# Patient Record
Sex: FEMALE | Race: WHITE | NOT HISPANIC OR LATINO | Employment: OTHER | URBAN - METROPOLITAN AREA
[De-identification: names, ages, dates, MRNs, and addresses within clinical notes are randomized per-mention and may not be internally consistent; named-entity substitution may affect disease eponyms.]

---

## 2017-01-04 ENCOUNTER — APPOINTMENT (OUTPATIENT)
Dept: LAB | Facility: CLINIC | Age: 77
End: 2017-01-04
Payer: MEDICARE

## 2017-01-04 DIAGNOSIS — E78.5 HYPERLIPIDEMIA: ICD-10-CM

## 2017-01-04 LAB
CHOLEST SERPL-MCNC: 230 MG/DL (ref 50–200)
HDLC SERPL-MCNC: 68 MG/DL (ref 40–60)
LDLC SERPL CALC-MCNC: 138 MG/DL (ref 0–100)
TRIGL SERPL-MCNC: 119 MG/DL

## 2017-01-04 PROCEDURE — 80061 LIPID PANEL: CPT

## 2017-01-04 PROCEDURE — 36415 COLL VENOUS BLD VENIPUNCTURE: CPT

## 2017-01-18 ENCOUNTER — ALLSCRIPTS OFFICE VISIT (OUTPATIENT)
Dept: OTHER | Facility: OTHER | Age: 77
End: 2017-01-18

## 2017-01-18 ENCOUNTER — LAB (OUTPATIENT)
Dept: LAB | Facility: CLINIC | Age: 77
End: 2017-01-18
Payer: MEDICARE

## 2017-01-18 DIAGNOSIS — E03.9 UNSPECIFIED HYPOTHYROIDISM: ICD-10-CM

## 2017-01-18 DIAGNOSIS — E03.9 HYPOTHYROIDISM: ICD-10-CM

## 2017-01-18 DIAGNOSIS — I12.9 PARENCHYMAL RENAL HYPERTENSION, STAGE 1-4 OR UNSPECIFIED CHRONIC KIDNEY DISEASE: Primary | ICD-10-CM

## 2017-01-18 DIAGNOSIS — M17.0 PRIMARY OSTEOARTHRITIS OF BOTH KNEES: ICD-10-CM

## 2017-01-18 DIAGNOSIS — M77.32 CALCANEAL SPUR OF LEFT FOOT: ICD-10-CM

## 2017-01-18 DIAGNOSIS — M48.061 SPINAL STENOSIS OF LUMBAR REGION: ICD-10-CM

## 2017-01-18 LAB
ALBUMIN SERPL BCP-MCNC: 3.9 G/DL (ref 3.5–5)
ANION GAP SERPL CALCULATED.3IONS-SCNC: 7 MMOL/L (ref 4–13)
BACTERIA UR QL AUTO: NORMAL /HPF
BILIRUB UR QL STRIP: NEGATIVE
BUN SERPL-MCNC: 17 MG/DL (ref 5–25)
CALCIUM SERPL-MCNC: 9.6 MG/DL (ref 8.3–10.1)
CHLORIDE SERPL-SCNC: 105 MMOL/L (ref 100–108)
CLARITY UR: NORMAL
CO2 SERPL-SCNC: 25 MMOL/L (ref 21–32)
COLOR UR: YELLOW
CREAT SERPL-MCNC: 1 MG/DL (ref 0.6–1.3)
CREAT UR-MCNC: 30.9 MG/DL
ERYTHROCYTE [DISTWIDTH] IN BLOOD BY AUTOMATED COUNT: 13.9 % (ref 11.6–15.1)
GFR SERPL CREATININE-BSD FRML MDRD: 53.8 ML/MIN/1.73SQ M
GLUCOSE SERPL-MCNC: 99 MG/DL (ref 65–140)
GLUCOSE UR STRIP-MCNC: NEGATIVE MG/DL
HCT VFR BLD AUTO: 41.7 % (ref 34.8–46.1)
HGB BLD-MCNC: 13.6 G/DL (ref 11.5–15.4)
HGB UR QL STRIP.AUTO: NEGATIVE
KETONES UR STRIP-MCNC: NEGATIVE MG/DL
LEUKOCYTE ESTERASE UR QL STRIP: NEGATIVE
MAGNESIUM SERPL-MCNC: 2.3 MG/DL (ref 1.6–2.6)
MCH RBC QN AUTO: 29.6 PG (ref 26.8–34.3)
MCHC RBC AUTO-ENTMCNC: 32.6 G/DL (ref 31.4–37.4)
MCV RBC AUTO: 91 FL (ref 82–98)
NITRITE UR QL STRIP: NEGATIVE
NON-SQ EPI CELLS URNS QL MICRO: NORMAL /HPF
PH UR STRIP.AUTO: 6.5 [PH] (ref 4.5–8)
PHOSPHATE SERPL-MCNC: 3.6 MG/DL (ref 2.3–4.1)
PLATELET # BLD AUTO: 230 THOUSANDS/UL (ref 149–390)
PMV BLD AUTO: 10.4 FL (ref 8.9–12.7)
POTASSIUM SERPL-SCNC: 4.3 MMOL/L (ref 3.5–5.3)
PROT UR STRIP-MCNC: NEGATIVE MG/DL
PROT UR-MCNC: <6 MG/DL
PROT/CREAT UR: <0.19 MG/G{CREAT} (ref 0–0.1)
PTH-INTACT SERPL-MCNC: 37.1 PG/ML (ref 14–72)
RBC # BLD AUTO: 4.59 MILLION/UL (ref 3.81–5.12)
RBC #/AREA URNS AUTO: NORMAL /HPF
SODIUM SERPL-SCNC: 137 MMOL/L (ref 136–145)
SP GR UR STRIP.AUTO: 1.01 (ref 1–1.03)
TSH SERPL DL<=0.05 MIU/L-ACNC: 2.39 UIU/ML (ref 0.36–3.74)
URATE SERPL-MCNC: 4.2 MG/DL (ref 2–6.8)
UROBILINOGEN UR QL STRIP.AUTO: 0.2 E.U./DL
WBC # BLD AUTO: 5.05 THOUSAND/UL (ref 4.31–10.16)
WBC #/AREA URNS AUTO: NORMAL /HPF

## 2017-01-18 PROCEDURE — 84443 ASSAY THYROID STIM HORMONE: CPT

## 2017-01-18 PROCEDURE — 85027 COMPLETE CBC AUTOMATED: CPT

## 2017-01-18 PROCEDURE — 84156 ASSAY OF PROTEIN URINE: CPT

## 2017-01-18 PROCEDURE — 80069 RENAL FUNCTION PANEL: CPT

## 2017-01-18 PROCEDURE — 81001 URINALYSIS AUTO W/SCOPE: CPT

## 2017-01-18 PROCEDURE — 36415 COLL VENOUS BLD VENIPUNCTURE: CPT

## 2017-01-18 PROCEDURE — 82570 ASSAY OF URINE CREATININE: CPT

## 2017-01-18 PROCEDURE — 83970 ASSAY OF PARATHORMONE: CPT

## 2017-01-18 PROCEDURE — 84550 ASSAY OF BLOOD/URIC ACID: CPT

## 2017-01-18 PROCEDURE — 83735 ASSAY OF MAGNESIUM: CPT

## 2017-01-31 ENCOUNTER — APPOINTMENT (OUTPATIENT)
Dept: PHYSICAL THERAPY | Facility: CLINIC | Age: 77
End: 2017-01-31
Payer: MEDICARE

## 2017-01-31 DIAGNOSIS — M17.0 PRIMARY OSTEOARTHRITIS OF BOTH KNEES: ICD-10-CM

## 2017-01-31 DIAGNOSIS — M48.061 SPINAL STENOSIS OF LUMBAR REGION: ICD-10-CM

## 2017-01-31 DIAGNOSIS — M77.32 CALCANEAL SPUR OF LEFT FOOT: ICD-10-CM

## 2017-01-31 PROCEDURE — 97140 MANUAL THERAPY 1/> REGIONS: CPT

## 2017-01-31 PROCEDURE — 97162 PT EVAL MOD COMPLEX 30 MIN: CPT

## 2017-01-31 PROCEDURE — G8991 OTHER PT/OT GOAL STATUS: HCPCS

## 2017-01-31 PROCEDURE — G8990 OTHER PT/OT CURRENT STATUS: HCPCS

## 2017-02-01 ENCOUNTER — GENERIC CONVERSION - ENCOUNTER (OUTPATIENT)
Dept: OTHER | Facility: OTHER | Age: 77
End: 2017-02-01

## 2017-02-07 ENCOUNTER — APPOINTMENT (OUTPATIENT)
Dept: PHYSICAL THERAPY | Facility: CLINIC | Age: 77
End: 2017-02-07
Payer: MEDICARE

## 2017-02-07 ENCOUNTER — GENERIC CONVERSION - ENCOUNTER (OUTPATIENT)
Dept: OTHER | Facility: OTHER | Age: 77
End: 2017-02-07

## 2017-02-07 PROCEDURE — 97110 THERAPEUTIC EXERCISES: CPT

## 2017-02-07 PROCEDURE — 97140 MANUAL THERAPY 1/> REGIONS: CPT

## 2017-02-10 ENCOUNTER — APPOINTMENT (OUTPATIENT)
Dept: PHYSICAL THERAPY | Facility: CLINIC | Age: 77
End: 2017-02-10
Payer: MEDICARE

## 2017-02-10 PROCEDURE — 97140 MANUAL THERAPY 1/> REGIONS: CPT

## 2017-02-10 PROCEDURE — 97110 THERAPEUTIC EXERCISES: CPT

## 2017-02-14 ENCOUNTER — APPOINTMENT (OUTPATIENT)
Dept: PHYSICAL THERAPY | Facility: CLINIC | Age: 77
End: 2017-02-14
Payer: MEDICARE

## 2017-02-14 PROCEDURE — 97140 MANUAL THERAPY 1/> REGIONS: CPT

## 2017-02-14 PROCEDURE — 97110 THERAPEUTIC EXERCISES: CPT

## 2017-02-17 ENCOUNTER — APPOINTMENT (OUTPATIENT)
Dept: PHYSICAL THERAPY | Facility: CLINIC | Age: 77
End: 2017-02-17
Payer: MEDICARE

## 2017-02-17 PROCEDURE — 97110 THERAPEUTIC EXERCISES: CPT

## 2017-02-17 PROCEDURE — 97140 MANUAL THERAPY 1/> REGIONS: CPT

## 2017-02-21 ENCOUNTER — APPOINTMENT (OUTPATIENT)
Dept: PHYSICAL THERAPY | Facility: CLINIC | Age: 77
End: 2017-02-21
Payer: MEDICARE

## 2017-02-21 PROCEDURE — 97140 MANUAL THERAPY 1/> REGIONS: CPT

## 2017-02-21 PROCEDURE — 97110 THERAPEUTIC EXERCISES: CPT

## 2017-02-24 ENCOUNTER — APPOINTMENT (OUTPATIENT)
Dept: PHYSICAL THERAPY | Facility: CLINIC | Age: 77
End: 2017-02-24
Payer: MEDICARE

## 2017-02-24 PROCEDURE — 97110 THERAPEUTIC EXERCISES: CPT

## 2017-02-28 ENCOUNTER — APPOINTMENT (OUTPATIENT)
Dept: PHYSICAL THERAPY | Facility: CLINIC | Age: 77
End: 2017-02-28
Payer: MEDICARE

## 2017-02-28 ENCOUNTER — GENERIC CONVERSION - ENCOUNTER (OUTPATIENT)
Dept: OTHER | Facility: OTHER | Age: 77
End: 2017-02-28

## 2017-02-28 PROCEDURE — 97110 THERAPEUTIC EXERCISES: CPT

## 2017-02-28 PROCEDURE — 97140 MANUAL THERAPY 1/> REGIONS: CPT

## 2017-02-28 PROCEDURE — G8991 OTHER PT/OT GOAL STATUS: HCPCS

## 2017-02-28 PROCEDURE — G8990 OTHER PT/OT CURRENT STATUS: HCPCS

## 2017-03-02 ENCOUNTER — APPOINTMENT (OUTPATIENT)
Dept: PHYSICAL THERAPY | Facility: CLINIC | Age: 77
End: 2017-03-02
Payer: MEDICARE

## 2017-03-02 PROCEDURE — 97110 THERAPEUTIC EXERCISES: CPT

## 2017-03-02 PROCEDURE — 97140 MANUAL THERAPY 1/> REGIONS: CPT

## 2017-03-07 ENCOUNTER — GENERIC CONVERSION - ENCOUNTER (OUTPATIENT)
Dept: OTHER | Facility: OTHER | Age: 77
End: 2017-03-07

## 2017-03-07 ENCOUNTER — APPOINTMENT (OUTPATIENT)
Dept: PHYSICAL THERAPY | Facility: CLINIC | Age: 77
End: 2017-03-07
Payer: MEDICARE

## 2017-03-07 PROCEDURE — 97110 THERAPEUTIC EXERCISES: CPT

## 2017-03-07 PROCEDURE — 97140 MANUAL THERAPY 1/> REGIONS: CPT

## 2017-03-09 ENCOUNTER — APPOINTMENT (OUTPATIENT)
Dept: PHYSICAL THERAPY | Facility: CLINIC | Age: 77
End: 2017-03-09
Payer: MEDICARE

## 2017-03-09 PROCEDURE — 97140 MANUAL THERAPY 1/> REGIONS: CPT

## 2017-03-09 PROCEDURE — 97150 GROUP THERAPEUTIC PROCEDURES: CPT

## 2017-03-09 PROCEDURE — 97110 THERAPEUTIC EXERCISES: CPT

## 2017-03-13 ENCOUNTER — APPOINTMENT (OUTPATIENT)
Dept: PHYSICAL THERAPY | Facility: CLINIC | Age: 77
End: 2017-03-13
Payer: MEDICARE

## 2017-03-13 PROCEDURE — 97110 THERAPEUTIC EXERCISES: CPT

## 2017-03-13 PROCEDURE — 97140 MANUAL THERAPY 1/> REGIONS: CPT

## 2017-03-14 ENCOUNTER — APPOINTMENT (OUTPATIENT)
Dept: PHYSICAL THERAPY | Facility: CLINIC | Age: 77
End: 2017-03-14
Payer: MEDICARE

## 2017-03-16 ENCOUNTER — APPOINTMENT (OUTPATIENT)
Dept: PHYSICAL THERAPY | Facility: CLINIC | Age: 77
End: 2017-03-16
Payer: MEDICARE

## 2017-03-21 ENCOUNTER — APPOINTMENT (OUTPATIENT)
Dept: PHYSICAL THERAPY | Facility: CLINIC | Age: 77
End: 2017-03-21
Payer: MEDICARE

## 2017-03-21 PROCEDURE — 97140 MANUAL THERAPY 1/> REGIONS: CPT

## 2017-03-21 PROCEDURE — 97110 THERAPEUTIC EXERCISES: CPT

## 2017-03-23 ENCOUNTER — APPOINTMENT (OUTPATIENT)
Dept: PHYSICAL THERAPY | Facility: CLINIC | Age: 77
End: 2017-03-23
Payer: MEDICARE

## 2017-03-23 PROCEDURE — 97110 THERAPEUTIC EXERCISES: CPT

## 2017-03-28 ENCOUNTER — APPOINTMENT (OUTPATIENT)
Dept: PHYSICAL THERAPY | Facility: CLINIC | Age: 77
End: 2017-03-28
Payer: MEDICARE

## 2017-03-28 PROCEDURE — G8991 OTHER PT/OT GOAL STATUS: HCPCS

## 2017-03-28 PROCEDURE — 97140 MANUAL THERAPY 1/> REGIONS: CPT

## 2017-03-28 PROCEDURE — G8990 OTHER PT/OT CURRENT STATUS: HCPCS

## 2017-03-28 PROCEDURE — 97110 THERAPEUTIC EXERCISES: CPT

## 2017-03-29 ENCOUNTER — GENERIC CONVERSION - ENCOUNTER (OUTPATIENT)
Dept: OTHER | Facility: OTHER | Age: 77
End: 2017-03-29

## 2017-03-30 ENCOUNTER — APPOINTMENT (OUTPATIENT)
Dept: PHYSICAL THERAPY | Facility: CLINIC | Age: 77
End: 2017-03-30
Payer: MEDICARE

## 2017-03-30 PROCEDURE — 97140 MANUAL THERAPY 1/> REGIONS: CPT

## 2017-03-30 PROCEDURE — 97110 THERAPEUTIC EXERCISES: CPT

## 2017-03-30 PROCEDURE — 97150 GROUP THERAPEUTIC PROCEDURES: CPT

## 2017-05-19 ENCOUNTER — ALLSCRIPTS OFFICE VISIT (OUTPATIENT)
Dept: OTHER | Facility: OTHER | Age: 77
End: 2017-05-19

## 2017-06-28 ENCOUNTER — GENERIC CONVERSION - ENCOUNTER (OUTPATIENT)
Dept: OTHER | Facility: OTHER | Age: 77
End: 2017-06-28

## 2017-07-03 DIAGNOSIS — E03.9 HYPOTHYROIDISM: ICD-10-CM

## 2017-07-03 DIAGNOSIS — E78.5 HYPERLIPIDEMIA: ICD-10-CM

## 2017-07-11 ENCOUNTER — APPOINTMENT (OUTPATIENT)
Dept: LAB | Facility: CLINIC | Age: 77
End: 2017-07-11
Payer: MEDICARE

## 2017-07-11 ENCOUNTER — TRANSCRIBE ORDERS (OUTPATIENT)
Dept: LAB | Facility: CLINIC | Age: 77
End: 2017-07-11

## 2017-07-11 DIAGNOSIS — E78.5 OTHER AND UNSPECIFIED HYPERLIPIDEMIA: ICD-10-CM

## 2017-07-11 DIAGNOSIS — E03.9 HYPOTHYROIDISM: ICD-10-CM

## 2017-07-11 DIAGNOSIS — E03.9 UNSPECIFIED HYPOTHYROIDISM: Primary | ICD-10-CM

## 2017-07-11 DIAGNOSIS — E78.5 HYPERLIPIDEMIA: ICD-10-CM

## 2017-07-11 LAB
ALBUMIN SERPL BCP-MCNC: 3.7 G/DL (ref 3.5–5)
ALP SERPL-CCNC: 73 U/L (ref 46–116)
ALT SERPL W P-5'-P-CCNC: 31 U/L (ref 12–78)
ANION GAP SERPL CALCULATED.3IONS-SCNC: 6 MMOL/L (ref 4–13)
AST SERPL W P-5'-P-CCNC: 21 U/L (ref 5–45)
BILIRUB SERPL-MCNC: 0.33 MG/DL (ref 0.2–1)
BUN SERPL-MCNC: 14 MG/DL (ref 5–25)
CALCIUM SERPL-MCNC: 9.4 MG/DL (ref 8.3–10.1)
CHLORIDE SERPL-SCNC: 105 MMOL/L (ref 100–108)
CHOLEST SERPL-MCNC: 227 MG/DL (ref 50–200)
CO2 SERPL-SCNC: 28 MMOL/L (ref 21–32)
CREAT SERPL-MCNC: 0.97 MG/DL (ref 0.6–1.3)
GFR SERPL CREATININE-BSD FRML MDRD: 55.7 ML/MIN/1.73SQ M
GLUCOSE P FAST SERPL-MCNC: 97 MG/DL (ref 65–99)
HDLC SERPL-MCNC: 67 MG/DL (ref 40–60)
LDLC SERPL CALC-MCNC: 149 MG/DL (ref 0–100)
POTASSIUM SERPL-SCNC: 4.4 MMOL/L (ref 3.5–5.3)
PROT SERPL-MCNC: 7.2 G/DL (ref 6.4–8.2)
SODIUM SERPL-SCNC: 139 MMOL/L (ref 136–145)
TRIGL SERPL-MCNC: 56 MG/DL
TSH SERPL DL<=0.05 MIU/L-ACNC: 2.42 UIU/ML (ref 0.36–3.74)

## 2017-07-11 PROCEDURE — 80053 COMPREHEN METABOLIC PANEL: CPT

## 2017-07-11 PROCEDURE — 84443 ASSAY THYROID STIM HORMONE: CPT

## 2017-07-11 PROCEDURE — 80061 LIPID PANEL: CPT

## 2017-07-11 PROCEDURE — 36415 COLL VENOUS BLD VENIPUNCTURE: CPT

## 2017-07-19 ENCOUNTER — ALLSCRIPTS OFFICE VISIT (OUTPATIENT)
Dept: OTHER | Facility: OTHER | Age: 77
End: 2017-07-19

## 2017-07-20 ENCOUNTER — GENERIC CONVERSION - ENCOUNTER (OUTPATIENT)
Dept: OTHER | Facility: OTHER | Age: 77
End: 2017-07-20

## 2017-09-05 ENCOUNTER — GENERIC CONVERSION - ENCOUNTER (OUTPATIENT)
Dept: OTHER | Facility: OTHER | Age: 77
End: 2017-09-05

## 2017-10-26 ENCOUNTER — GENERIC CONVERSION - ENCOUNTER (OUTPATIENT)
Dept: OTHER | Facility: OTHER | Age: 77
End: 2017-10-26

## 2017-11-29 ENCOUNTER — TRANSCRIBE ORDERS (OUTPATIENT)
Dept: ADMINISTRATIVE | Facility: HOSPITAL | Age: 77
End: 2017-11-29

## 2017-11-29 DIAGNOSIS — Z12.39 BREAST SCREENING: Primary | ICD-10-CM

## 2017-12-11 ENCOUNTER — HOSPITAL ENCOUNTER (OUTPATIENT)
Dept: MAMMOGRAPHY | Facility: HOSPITAL | Age: 77
Discharge: HOME | End: 2017-12-11
Attending: OBSTETRICS & GYNECOLOGY
Payer: MEDICARE

## 2017-12-11 ENCOUNTER — TRANSCRIBE ORDERS (OUTPATIENT)
Dept: ADMINISTRATIVE | Facility: HOSPITAL | Age: 77
End: 2017-12-11

## 2017-12-11 DIAGNOSIS — Z12.39 SCREENING BREAST EXAMINATION: Primary | ICD-10-CM

## 2017-12-11 DIAGNOSIS — Z12.39 BREAST SCREENING: ICD-10-CM

## 2017-12-12 ENCOUNTER — GENERIC CONVERSION - ENCOUNTER (OUTPATIENT)
Dept: OBGYN CLINIC | Facility: CLINIC | Age: 77
End: 2017-12-12

## 2017-12-12 ENCOUNTER — HOSPITAL ENCOUNTER (OUTPATIENT)
Dept: MAMMOGRAPHY | Facility: MEDICAL CENTER | Age: 77
Discharge: HOME | End: 2017-12-12
Payer: MEDICARE

## 2017-12-12 DIAGNOSIS — Z12.39 SCREENING BREAST EXAMINATION: ICD-10-CM

## 2017-12-12 PROCEDURE — 77063 BREAST TOMOSYNTHESIS BI: CPT

## 2017-12-12 PROCEDURE — G0202 SCR MAMMO BI INCL CAD: HCPCS

## 2018-01-13 VITALS
SYSTOLIC BLOOD PRESSURE: 128 MMHG | BODY MASS INDEX: 33.99 KG/M2 | HEIGHT: 61 IN | DIASTOLIC BLOOD PRESSURE: 72 MMHG | WEIGHT: 180 LBS

## 2018-01-14 VITALS
HEIGHT: 61 IN | WEIGHT: 167 LBS | BODY MASS INDEX: 31.53 KG/M2 | SYSTOLIC BLOOD PRESSURE: 122 MMHG | DIASTOLIC BLOOD PRESSURE: 72 MMHG

## 2018-01-18 NOTE — PROGRESS NOTES
Assessment    1  Adult hypothyroidism (244 9) (E03 9)   2  Hyperlipemia (272 4) (E78 5)   3  Encounter for preventive health examination (V70 0) (Z00 00)    Discussion/Summary  Impression: Initial Annual Wellness Visit, with preventive exam as well as age and risk appropriate counseling completed  Cardiovascular screening and counseling: screening is current  Diabetes screening and counseling: screening is current  Colorectal cancer screening and counseling: the risks and benefits of screening were discussed  Cervical cancer screening and counseling: screening not indicated  Osteoporosis screening and counseling: screening is current  Immunizations: influenza vaccine is up to date this year, UTD and Zostavax vaccination up to date  Advance Directive Planning: complete and up to date  Patient Discussion: plan discussed with the patient, follow-up visit needed in one year  Chief Complaint  Medicare wellness, had cataract surgery and has been having some issues but she does see the eye doctor tomorrow      History of Present Illness  Welcome to Medicare and Wellness Visits: The patient is being seen for the subsequent annual wellness visit  Medicare Screening and Risk Factors   Hospitalizations: she has been previously hospitalizied, she has been hospitalized 2 times and cataract surgery both eyes  Medicare Screening Tests Risk Questions   Drug and Alcohol Use: The patient is a former cigarette smoker, quit smoking 1991 and 1/4 pack a day  The patient reports occasional alcohol use, drinking 2-3 drinks per month and 2 glasses of wine  Alcohol concern:  no attempts to cut alcohol use  She has never used illicit drugs     Diet and Physical Activity: Current diet includes well balanced meals, low fat food choices, low salt food choices, limited junk food, 1-2 servings of fruit per day, 2-4 servings of vegetables per day, 1-2 servings of meat per day, 1-4 servings of whole grains per day, 1-2 servings of dairy products per day, 1-2 cups of coffee per day, 1-2 cups of tea per day and 8 glasses of water/day  She exercises 3 times per week  Exercise: stretching 90 minutes per week  Mood Disorder and Cognitive Impairment Screening: She denies feeling down, depressed, or hopeless over the past two weeks  She denies feeling little interest or pleasure in doing things over the past two weeks  Cognitive impairment screening: denies difficulty learning/retaining new information, denies difficulty handling complex tasks, denies difficulty with reasoning, denies difficulty with spatial ability and orientation, denies difficulty with language and denies difficulty with behavior  Functional Ability/Level of Safety: Hearing is slightly decreased, slightly decreased in the right ear, tinnitus and a hearing aid is not used  She reports hearing difficulties  Activities of daily living details: yard work and heavy cleaning, but does not need help using the phone, no transportation help needed, does not need help shopping, no meal preparation help needed, does not need help doing housework, does not need help doing laundry, does not need help managing medications and does not need help managing money  Fall risk factors: The patient fell 0 times in the past 12 months  Home safety risk factors:  no grab bars in the bathroom, but no unfamiliar surroundings, no loose rugs, no poor household lighting, no uneven floors, no household clutter and handrails on the stairs  Advance Directives: Advance directives: living will, durable power of  for health care directives and advance directives  Co-Managers and Medical Equipment/Suppliers: See Patient Care Team   Preventive Quality Program 65 and Older: Urinary Incontinence Symptoms includes: urinary incontinence and nocturia    Gets up 1-3 times at night to urinate     Date of last glaucoma screen was 7/20/2017      Patient Care Team    Care Team Member Role Specialty Office Number   [facility]  Dermatology [phone]   [address] [phone]   [facility] DO  Ophthalmology [phone]   Bharati Johnson MD  Gastroenterology Adult [phone]     Review of Systems    Constitutional: no fatigue  Head and Face: no facial pressure  Eyes: s/p bilateral cataract, but no watery discharge from the eyes  ENT: no nasal congestion and no nasal discharge  Cardiovascular: no chest pain, no palpitations and the heart is not racing  Respiratory: no shortness of breath  Gastrointestinal: no abdominal pain  Genitourinary: no dysuria  Musculoskeletal: back and knees driss stiff  Hasn't worked out in one month due to [address]  Integumentary and Breasts: negative  Psychiatric: no insomnia  Active Problems    1  Adult hypothyroidism (244 9) (E03 9)   2  Encounter for screening for osteoporosis (V82 81) (Z13 820)   3  Encounter for screening mammogram for breast cancer (V76 12) (Z12 31)   4  Glaucoma (365 9) (H40 9)   5  Heel spur, left (726 73) (M77 32)   6  Hyperlipemia (272 4) (E78 5)   7  Lumbar canal stenosis (724 02) (M48 06)   8  Lumbar radiculopathy (724 4) (M54 16)   9  Meningioma (225 2) (D32 9)   10  Peripheral neuropathy (356 9) (G62 9)   11  Pre-op evaluation (V72 84) (Z01 818)   12  Primary osteoarthritis of both knees (715 16) (M17 0)   13  Screening for colon cancer (V76 51) (Z12 11)   14  Screening for depression (V79 0) (Z13 89)   15  Vitamin D deficiency (268 9) (E55 9)    Past Medical History    · History of Arthritis (V13 4)   · History of migraine (V12 49) (Z86 69)   · History of thyroid disease (V12 29) (Z86 39)   · History of Pure hypercholesterolemia (272 0) (E78 00)    The active problems and past medical history were reviewed and updated today        Surgical History    · History of Dilation And Curettage   · History of Patient Education - Asthma (V65 49)   · History of Tubal Ligation    The surgical history was reviewed and updated today  Family History  Mother    · Family history of Pancreatic Cancer Susceptibility  Father    · Family history of Reported Family History Of Kidney Disease    The family history was reviewed and updated today  Social History    · Being A Social Drinker   · Caffeine Use   · Denied: History of Drug Use   · Marital History -  (V61 03)   · Never a smoker  The social history was reviewed and updated today  Current Meds   1  Levothyroxine Sodium 50 MCG Oral Tablet; take 1 tablet by mouth daily; Therapy: 89CHD4378 to (Evaluate:17Ber0320)  Requested for: 51QSE5569; Last   GK:91ICU2511 Ordered   2  Lumigan 0 01 % Ophthalmic Solution; INSTILL 1 DROP INTO BOTH EYES ONCE DAILY   IN THE EVENING; Last Rx:13Jan2016 Ordered   3  Multi-Day Oral Tablet; Take 1 daily; Last Rx:13Jan2016 Ordered   4  Pravastatin Sodium 20 MG Oral Tablet; take 1 tablet by mouth every day; Therapy: 70FBR2354 to (Evaluate:88Muu0771)  Requested for: 43UJX3798; Last   Rx:06Jun2017 Ordered   5  ProAir  (90 Base) MCG/ACT Inhalation Aerosol Solution; INHALE 2 PUFFS   EVERY 4-6 HOURS AS NEEDED  Requested for: 36LIB4507; Last Rx:08Mar2016   Ordered   6  Vitamin C TABS; Therapy: (Recorded:10Nov2016) to Recorded   7  Vitamin D 2000 UNIT Oral Tablet; Take one tablet daily; Last Rx:13Jan2016 Ordered    The medication list was reviewed and updated today  Allergies    1  Penicillins    Immunizations   1 2    Influenza  18-Sep-2015  (75y) 15-Sep-2016  (75y)    PCV  19-Jun-2015  (74y)     PPSV  13-Jul-2008  (67y)     Tetanus  13-Jan-2012  (71y)     Zoster  13-Jan-2010  (69y)      Vitals  Signs    Systolic: 917  Diastolic: 72   Height: 5 ft 0 7 in  Weight: 175 lb 6 oz  BMI Calculated: 33 46  BSA Calculated: 1 78    Physical Exam    Constitutional   General appearance: No acute distress, well appearing and well nourished    well developed, obese and appears younger than stated age  Ears, Nose, Mouth, and Throat   Otoscopic examination: Tympanic membranes translucent with normal light reflex  Canals patent without erythema  Lips, teeth, and gums: Normal, good dentition  Oropharynx: Normal with no erythema, edema, exudate or lesions  Pulmonary   Auscultation of lungs: Clear to auscultation  Cardiovascular   Auscultation of heart: Normal rate and rhythm, normal S1 and S2, no murmurs  Carotid pulses: 2+ bilaterally  Pedal pulses: 2+ bilaterally  Examination of extremities for edema and/or varicosities: Normal     Abdomen   Abdomen: Non-tender, no masses  Liver and spleen: No hepatomegaly or splenomegaly  Musculoskeletal   Gait and station: Abnormal   Gait evaluation demonstrated antalgia bilaterally  Range of motion: Abnormal   Range of Motion: Extension: painful  Neurologic   Sensation: No sensory loss      Psychiatric   Orientation to person, place, and time: Normal     Mood and affect: Normal        Future Appointments    Date/Time Provider Specialty Site   48/10/3920 59:29 AM Mari Harvey DO Family Medicine [facility]     Signatures   Electronically signed by : Gifty Rowe DO; Jul 20 0263 10:53AM EST                       (Author)

## 2018-01-19 ENCOUNTER — ALLSCRIPTS OFFICE VISIT (OUTPATIENT)
Dept: OTHER | Facility: OTHER | Age: 78
End: 2018-01-19

## 2018-01-20 NOTE — PROGRESS NOTES
Assessment   1  Adult hypothyroidism (244 9) (E03 9)   2  Hyperlipemia (272 4) (E78 5)   3  Lumbar canal stenosis (724 02) (M48 061)   4  Need for pneumococcal vaccine (V03 82) (Z23)   5  Primary osteoarthritis of both knees (715 16) (M17 0)    Plan   Adult hypothyroidism    · Levothyroxine Sodium 50 MCG Oral Tablet; take 1 tablet by mouth daily  Hyperlipemia    · Pravastatin Sodium 20 MG Oral Tablet; take 1 tablet by mouth every day  Lumbar canal stenosis    · * MRI LUMBAR SPINE WO CONTRAST; Status:Need Information - Financial    Authorization; Requested BWS:[id number];   Need for pneumococcal vaccine    · Prevnar 13 Intramuscular Suspension    Chief Complaint   Pt here for 6 month follow up of her chronic problems and medications  Her pharmacy has changed the tiers of 2 of her medications and wants to discuss this with you  Was going to physical therapy for muscle spasms, completed PT and was doing treadmill and bike independently but had to stop due to an increase of spasms  Her knees and left hip are giving her a lot of problem  History of Present Illness   The patient is being seen for follow-up of central hypothyroidism  The patient states her hyperlipidemia has been stable since the last visit  Symptoms:    The patient is being seen for a routine clinic follow-up of chronic low back pain  The patient is being seen for a knee problem , follow-up   Previous presentation included osteoarthritis  Past evaluation has included orthopedic evaluation  Review of Systems      ROS reviewed  Active Problems   1  Adult hypothyroidism (244 9) (E03 9)   2  Encounter for screening for osteoporosis (V82 81) (Z13 820)   3  Encounter for screening mammogram for breast cancer (V76 12) (Z12 31)   4  Glaucoma (365 9) (H40 9)   5  Heel spur, left (726 73) (M77 32)   6  Hyperlipemia (272 4) (E78 5)   7  Lumbar canal stenosis (724 02) (M48 061)   8  Lumbar radiculopathy (724 4) (M54 16)   9   Meningioma (225 2) (D32 9)   10  Peripheral neuropathy (356 9) (G62 9)   11  Pre-op evaluation (V72 84) (Z01 818)   12  Primary osteoarthritis of both knees (715 16) (M17 0)   13  Screening for colon cancer (V76 51) (Z12 11)   14  Screening for depression (V79 0) (Z13 89)   15  Vitamin D deficiency (268 9) (E55 9)    Past Medical History   1  History of Arthritis (V13 4)   2  History of migraine (V12 49) (Z86 69)   3  History of thyroid disease (V12 29) (Z86 39)   4  History of Pure hypercholesterolemia (272 0) (E78 00)     The active problems and past medical history were reviewed and updated today  Surgical History   1  History of Dilation And Curettage   2  History of Patient Education - Asthma (V65 49)   3  History of Tubal Ligation     The surgical history was reviewed and updated today  Family History   Mother    1  Family history of Pancreatic Cancer Susceptibility  Father    2  Family history of Reported Family History Of Kidney Disease     The family history was reviewed and updated today  Social History    · Being A Social Drinker   · Caffeine Use   · Denied: History of Drug Use   · Marital History -  (V61 03)   · Never a smoker  The social history was reviewed and updated today  Current Meds    1  Levothyroxine Sodium 50 MCG Oral Tablet; take 1 tablet by mouth daily; Therapy: 51MRQ4167 to ()  Requested for: 65ZLH1551; Last     Rx:30Oct2017 Ordered   2  Lumigan 0 01 % Ophthalmic Solution; INSTILL 1 DROP INTO BOTH EYES ONCE DAILY     IN THE EVENING; Last Rx:13Jan2016 Ordered   3  Multi-Day Oral Tablet; Take 1 daily; Last Rx:13Jan2016 Ordered   4  Pravastatin Sodium 20 MG Oral Tablet; take 1 tablet by mouth every day; Therapy: 24YTT2148 to (Marlyn Torres)  Requested for: 52Asi1843; Last     Rx:86Prj9300 Ordered   5   ProAir  (90 Base) MCG/ACT Inhalation Aerosol Solution; INHALE 2 PUFFS     EVERY 4-6 HOURS AS NEEDED  Requested for: 17IJV9799; Last WW:56WPT9294 Ordered   6  Vitamin C TABS; Therapy: (Recorded:10Nov2016) to Recorded   7  Vitamin D 2000 UNIT Oral Tablet; Take one tablet daily; Last Rx:13Jan2016 Ordered    Allergies   1  Penicillins    Vitals   Vital Signs    Recorded: 24GNX1383 11:18AM Recorded: 27SHI5931 25:52BA   Systolic 864    Diastolic 72    Height  5 ft 0 75 in   Weight  178 lb 8 oz   BMI Calculated  34 01   BSA Calculated  1 79     Results/Data   PHQ-2 Adult Depression Screening 19IGA4237 11:02AM User, Ahs      Test Name Result Flag Reference   PHQ-2 Adult Depression Score 0     Over the last two weeks, how often have you been bothered by any of the following problems?      Little interest or pleasure in doing things: Not at all - 0     Feeling down, depressed, or hopeless: Not at all - 0   PHQ-2 Adult Depression Screening Negative          Signatures    Electronically signed by : Prudencio Gallagher DO; Jan 19 7648  4:19PM EST                       (Author)

## 2018-01-22 VITALS
HEIGHT: 61 IN | WEIGHT: 175.38 LBS | BODY MASS INDEX: 33.11 KG/M2 | DIASTOLIC BLOOD PRESSURE: 72 MMHG | SYSTOLIC BLOOD PRESSURE: 122 MMHG

## 2018-01-23 VITALS
WEIGHT: 178.5 LBS | SYSTOLIC BLOOD PRESSURE: 128 MMHG | DIASTOLIC BLOOD PRESSURE: 72 MMHG | HEIGHT: 61 IN | BODY MASS INDEX: 33.7 KG/M2
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
## 2018-01-30 ENCOUNTER — TELEPHONE (OUTPATIENT)
Dept: FAMILY MEDICINE CLINIC | Facility: CLINIC | Age: 78
End: 2018-01-30

## 2018-01-30 NOTE — TELEPHONE ENCOUNTER
Patient called for her MRI of the lumbar spine results from 1/22/18  It was done at [facility]#[phone]

## 2018-01-30 NOTE — TELEPHONE ENCOUNTER
Unfortunately I don't see any info under "care anywhere" We are going to have to CALL for the results

## 2018-02-02 ENCOUNTER — TELEPHONE (OUTPATIENT)
Dept: FAMILY MEDICINE CLINIC | Facility: CLINIC | Age: 78
End: 2018-02-02

## 2018-02-02 NOTE — TELEPHONE ENCOUNTER
Patient calling for MRI results done at [address] [place] [address] [address] on 1/22/18, I did print results and put them on your work station to review

## 2018-02-02 NOTE — TELEPHONE ENCOUNTER
Most of the levels of the thoracic and lumbar discs are unchanged from previous MRI  Although they are unchanged they certainly do show significant degenerative process  The 1 level that did change for the worse is the L3-L4 level right-sided and left-sided changes  As well as increased central spinal stenosis  And at L4-L5 even though it is unchanged, there is marked spinal canal stenosis  So have the patient remind me again which way we were going to move forward with the findings  Continued physical therapy? Evaluation by Pain Management? Or evaluation by spinal surgeon?

## 2018-02-05 PROBLEM — M77.32 HEEL SPUR, LEFT: Status: ACTIVE | Noted: 2017-01-18

## 2018-02-05 RX ORDER — ALBUTEROL SULFATE 90 UG/1
2 AEROSOL, METERED RESPIRATORY (INHALATION) EVERY 4 HOURS PRN
COMMUNITY
End: 2018-09-20 | Stop reason: SDUPTHER

## 2018-02-05 RX ORDER — RIBOFLAVIN (VITAMIN B2) 100 MG
100 TABLET ORAL DAILY
COMMUNITY
End: 2021-08-05

## 2018-02-05 RX ORDER — LEVOTHYROXINE SODIUM 0.05 MG/1
1 TABLET ORAL DAILY
COMMUNITY
Start: 2017-01-31 | End: 2018-07-27 | Stop reason: SDUPTHER

## 2018-02-05 RX ORDER — PRAVASTATIN SODIUM 20 MG
1 TABLET ORAL DAILY
COMMUNITY
Start: 2016-12-11 | End: 2019-01-11 | Stop reason: SDUPTHER

## 2018-02-05 RX ORDER — MULTIVIT-MIN/IRON/FOLIC ACID/K 18-600-40
1 CAPSULE ORAL DAILY
COMMUNITY

## 2018-02-06 ENCOUNTER — TELEPHONE (OUTPATIENT)
Dept: FAMILY MEDICINE CLINIC | Facility: CLINIC | Age: 78
End: 2018-02-06

## 2018-02-06 ENCOUNTER — TRANSCRIBE ORDERS (OUTPATIENT)
Dept: LAB | Facility: CLINIC | Age: 78
End: 2018-02-06

## 2018-02-06 ENCOUNTER — OFFICE VISIT (OUTPATIENT)
Dept: FAMILY MEDICINE CLINIC | Facility: CLINIC | Age: 78
End: 2018-02-06
Payer: MEDICARE

## 2018-02-06 ENCOUNTER — LAB (OUTPATIENT)
Dept: LAB | Facility: CLINIC | Age: 78
End: 2018-02-06
Payer: MEDICARE

## 2018-02-06 VITALS
BODY MASS INDEX: 33.42 KG/M2 | HEIGHT: 61 IN | WEIGHT: 177 LBS | DIASTOLIC BLOOD PRESSURE: 74 MMHG | TEMPERATURE: 98.1 F | SYSTOLIC BLOOD PRESSURE: 124 MMHG

## 2018-02-06 DIAGNOSIS — E66.9 LIFELONG OBESITY: ICD-10-CM

## 2018-02-06 DIAGNOSIS — E78.5 HYPERLIPIDEMIA, UNSPECIFIED HYPERLIPIDEMIA TYPE: ICD-10-CM

## 2018-02-06 DIAGNOSIS — N18.30 CHRONIC KIDNEY DISEASE, STAGE III (MODERATE) (HCC): ICD-10-CM

## 2018-02-06 DIAGNOSIS — E03.9 MYXEDEMA HEART DISEASE: ICD-10-CM

## 2018-02-06 DIAGNOSIS — I12.9 PARENCHYMAL RENAL HYPERTENSION, STAGE 1 THROUGH STAGE 4 OR UNSPECIFIED CHRONIC KIDNEY DISEASE: ICD-10-CM

## 2018-02-06 DIAGNOSIS — R60.9 EDEMA, UNSPECIFIED TYPE: ICD-10-CM

## 2018-02-06 DIAGNOSIS — R60.0 LOCALIZED EDEMA: ICD-10-CM

## 2018-02-06 DIAGNOSIS — I51.9 MYXEDEMA HEART DISEASE: ICD-10-CM

## 2018-02-06 DIAGNOSIS — M48.061 SPINAL STENOSIS OF LUMBAR REGION, UNSPECIFIED WHETHER NEUROGENIC CLAUDICATION PRESENT: ICD-10-CM

## 2018-02-06 DIAGNOSIS — R10.11 RIGHT UPPER QUADRANT ABDOMINAL PAIN: Primary | ICD-10-CM

## 2018-02-06 DIAGNOSIS — E55.9 AVITAMINOSIS D: ICD-10-CM

## 2018-02-06 DIAGNOSIS — I12.9 PARENCHYMAL RENAL HYPERTENSION, STAGE 1 THROUGH STAGE 4 OR UNSPECIFIED CHRONIC KIDNEY DISEASE: Primary | ICD-10-CM

## 2018-02-06 LAB
25(OH)D3 SERPL-MCNC: 51.4 NG/ML (ref 30–100)
ALBUMIN SERPL BCP-MCNC: 4 G/DL (ref 3.5–5)
ALP SERPL-CCNC: 79 U/L (ref 46–116)
ALT SERPL W P-5'-P-CCNC: 27 U/L (ref 12–78)
ANION GAP SERPL CALCULATED.3IONS-SCNC: 7 MMOL/L (ref 4–13)
AST SERPL W P-5'-P-CCNC: 21 U/L (ref 5–45)
BACTERIA UR QL AUTO: NORMAL /HPF
BILIRUB SERPL-MCNC: 0.37 MG/DL (ref 0.2–1)
BILIRUB UR QL STRIP: NEGATIVE
BUN SERPL-MCNC: 9 MG/DL (ref 5–25)
CALCIUM SERPL-MCNC: 9.6 MG/DL (ref 8.3–10.1)
CHLORIDE SERPL-SCNC: 105 MMOL/L (ref 100–108)
CLARITY UR: CLEAR
CO2 SERPL-SCNC: 27 MMOL/L (ref 21–32)
COLOR UR: YELLOW
CREAT SERPL-MCNC: 0.9 MG/DL (ref 0.6–1.3)
CREAT UR-MCNC: 54.3 MG/DL
ERYTHROCYTE [DISTWIDTH] IN BLOOD BY AUTOMATED COUNT: 13.9 % (ref 11.6–15.1)
GFR SERPL CREATININE-BSD FRML MDRD: 61 ML/MIN/1.73SQ M
GLUCOSE P FAST SERPL-MCNC: 86 MG/DL (ref 65–99)
GLUCOSE UR STRIP-MCNC: NEGATIVE MG/DL
HCT VFR BLD AUTO: 43.5 % (ref 34.8–46.1)
HGB BLD-MCNC: 13.8 G/DL (ref 11.5–15.4)
HGB UR QL STRIP.AUTO: NEGATIVE
HYALINE CASTS #/AREA URNS LPF: NORMAL /LPF
KETONES UR STRIP-MCNC: NEGATIVE MG/DL
LEUKOCYTE ESTERASE UR QL STRIP: NEGATIVE
LIPASE SERPL-CCNC: 157 U/L (ref 73–393)
MAGNESIUM SERPL-MCNC: 2.6 MG/DL (ref 1.6–2.6)
MCH RBC QN AUTO: 29.2 PG (ref 26.8–34.3)
MCHC RBC AUTO-ENTMCNC: 31.7 G/DL (ref 31.4–37.4)
MCV RBC AUTO: 92 FL (ref 82–98)
NITRITE UR QL STRIP: NEGATIVE
NON-SQ EPI CELLS URNS QL MICRO: NORMAL /HPF
PH UR STRIP.AUTO: 6.5 [PH] (ref 4.5–8)
PHOSPHATE SERPL-MCNC: 3.4 MG/DL (ref 2.3–4.1)
PLATELET # BLD AUTO: 257 THOUSANDS/UL (ref 149–390)
PMV BLD AUTO: 10.7 FL (ref 8.9–12.7)
POTASSIUM SERPL-SCNC: 4.4 MMOL/L (ref 3.5–5.3)
PROT SERPL-MCNC: 7.9 G/DL (ref 6.4–8.2)
PROT UR STRIP-MCNC: NEGATIVE MG/DL
PROT UR-MCNC: <6 MG/DL
PROT/CREAT UR: <0.11 MG/G{CREAT} (ref 0–0.1)
RBC # BLD AUTO: 4.73 MILLION/UL (ref 3.81–5.12)
RBC #/AREA URNS AUTO: NORMAL /HPF
SODIUM SERPL-SCNC: 139 MMOL/L (ref 136–145)
SP GR UR STRIP.AUTO: 1.01 (ref 1–1.03)
UROBILINOGEN UR QL STRIP.AUTO: 0.2 E.U./DL
WBC # BLD AUTO: 6.19 THOUSAND/UL (ref 4.31–10.16)
WBC #/AREA URNS AUTO: NORMAL /HPF

## 2018-02-06 PROCEDURE — 84156 ASSAY OF PROTEIN URINE: CPT

## 2018-02-06 PROCEDURE — 83735 ASSAY OF MAGNESIUM: CPT

## 2018-02-06 PROCEDURE — 82306 VITAMIN D 25 HYDROXY: CPT

## 2018-02-06 PROCEDURE — 82570 ASSAY OF URINE CREATININE: CPT

## 2018-02-06 PROCEDURE — 81001 URINALYSIS AUTO W/SCOPE: CPT

## 2018-02-06 PROCEDURE — 99214 OFFICE O/P EST MOD 30 MIN: CPT | Performed by: NURSE PRACTITIONER

## 2018-02-06 PROCEDURE — 84100 ASSAY OF PHOSPHORUS: CPT

## 2018-02-06 PROCEDURE — 83690 ASSAY OF LIPASE: CPT | Performed by: NURSE PRACTITIONER

## 2018-02-06 PROCEDURE — 80053 COMPREHEN METABOLIC PANEL: CPT | Performed by: NURSE PRACTITIONER

## 2018-02-06 PROCEDURE — 85027 COMPLETE CBC AUTOMATED: CPT

## 2018-02-06 PROCEDURE — 36415 COLL VENOUS BLD VENIPUNCTURE: CPT | Performed by: NURSE PRACTITIONER

## 2018-02-06 NOTE — PROGRESS NOTES
Assessment/Plan:    Lumbar canal stenosis  Recent MRI was reviewed with the patient  She does want to continue with physical therapy did but does feel like she is to see pain management again, Dr Jameson Chakraborty with [facility]  Referral was given  Discussed the spasms she gets with this back pain and it is interfering with traveling because she is scared it will happen on a plan  Discussed the possibility of trying a muscle relaxant for traveling  She will discuss this with her nephrologist     Right upper quadrant abdominal pain   Right upper quadrant pain that gets worse after eating  Patient states it is getting better since Thursday but there is still some discomfort there after she eats  Patient was given lab work of a CMP and a lipase  She is going for lab work for her nephrologist today which will also include is CBC  She was given  Ultrasound of the abdomen order as well  She was educated on acute abdomen symptoms and to not hesitate to go to the emergency room if the symptoms do occur  She agrees with the plan and understands  Will follow up with lab work and abdominal ultrasound results  She will notify us of any changes and go to the ER if symptoms worsen or has any symptoms of acute abdomen that were educated to her  She made her 6 month follow up with Dr Amanda Tinsley today  Diagnoses and all orders for this visit:    Right upper quadrant abdominal pain  -     Lipase  -     Comprehensive metabolic panel  -     US abdomen complete; Future    Spinal stenosis of lumbar region, unspecified whether neurogenic claudication present  -     Ambulatory referral to Pain Management; Future        Patient Instructions   Complete lab work today which is added on to the lab work she is getting done with renal which includes a CBC  Get Ultrasound completed  If any changes let us know, acute changes that warrant an ER visit were educated to the patient  She understands and agrees with this plan         Subjective: Patient ID: Richa Higgins is a 65 y o  female  Chief Complaint   Patient presents with    Abdominal Pain     RUQ tenderness/pain x 5 days; concerned with gallbladder       Started Thursday night after she Ate [employer], had pressure "balloon" feeling in Right mid/upper abdomen and stated it felt firm  Firmness has resided  Has not eating yet today because she has blood work to get done  Discomfort is worsened with eating  Had severe pain in gallbladder area in the past, said she had EGD, only showed cholecystitis and didn't have to operate, just dietary changes  Her mom's sister and dad's sister had pancreatic cancer which is why she was concerned  Abdominal Pain   This is a new problem  The current episode started in the past 7 days (Thursday Night)  The onset quality is gradual  The problem occurs intermittently  The most recent episode lasted 5 days  The problem has been gradually improving  The pain is located in the RUQ  The pain is at a severity of 2/10  The pain is mild  The quality of the pain is a sensation of fullness  The abdominal pain radiates to the RUQ  Pertinent negatives include no anorexia, diarrhea, dysuria, fever, headaches, nausea or vomiting  The pain is aggravated by eating  The pain is relieved by nothing  She has tried nothing for the symptoms  cholecystitis         Review of Systems   Constitutional: Negative for appetite change and fever  HENT: Negative for congestion, sneezing and sore throat  Eyes: Negative for visual disturbance  Respiratory: Negative for cough and shortness of breath  Cardiovascular: Negative for chest pain and palpitations  Gastrointestinal: Positive for abdominal pain  Negative for anorexia, diarrhea, nausea and vomiting  Genitourinary: Negative for difficulty urinating and dysuria  Musculoskeletal:        Chronic back pain     Skin: Negative for color change  Neurological: Negative for headaches     Psychiatric/Behavioral: Negative for agitation  Objective:  /74 (BP Location: Left arm, Patient Position: Sitting, Cuff Size: Standard)   Temp 98 1 °F (36 7 °C)   Ht 5' 1" (1 549 m)   Wt 80 3 kg (177 lb)   BMI 33 44 kg/m²      Physical Exam   Constitutional: She is oriented to person, place, and time  She appears well-nourished  No distress  HENT:   Head: Normocephalic and atraumatic  Right Ear: External ear normal    Left Ear: External ear normal    Eyes: Right eye exhibits no discharge  Left eye exhibits no discharge  No scleral icterus  Neck: Normal range of motion  Neck supple  No tracheal deviation present  Cardiovascular: Normal rate and regular rhythm  No murmur heard  Pulmonary/Chest: Effort normal and breath sounds normal  No respiratory distress  She has no wheezes  Abdominal: Soft  Bowel sounds are normal  She exhibits no distension, no fluid wave, no abdominal bruit, no ascites and no mass  There is tenderness in the epigastric area  There is no rigidity, no rebound, no guarding, no tenderness at McBurney's point and negative De La Rosa's sign  No hernia  Musculoskeletal: She exhibits no edema or deformity  Lymphadenopathy:     She has no cervical adenopathy  Neurological: She is alert and oriented to person, place, and time  Skin: Skin is warm and dry  No rash noted  She is not diaphoretic  No erythema  No pallor  Psychiatric: She has a normal mood and affect   Her behavior is normal  Judgment and thought content normal

## 2018-02-06 NOTE — ASSESSMENT & PLAN NOTE
Recent MRI was reviewed with the patient  She does want to continue with physical therapy did but does feel like she is to see pain management again, Dr Jennifer Fuchs with [facility]  Referral was given  Discussed the spasms she gets with this back pain and it is interfering with traveling because she is scared it will happen on a plan  Discussed the possibility of trying a muscle relaxant for traveling   She will discuss this with her nephrologist

## 2018-02-06 NOTE — PATIENT INSTRUCTIONS
Complete lab work today which is added on to the lab work she is getting done with renal which includes a CBC  Get Ultrasound completed  If any changes let us know, acute changes that warrant an ER visit were educated to the patient  She understands and agrees with this plan

## 2018-02-06 NOTE — PROGRESS NOTES
I have reviewed the notes, assessments, and/or procedures performed by [facility], I concur with her/his documentation of Luis Stephens

## 2018-02-06 NOTE — ASSESSMENT & PLAN NOTE
Right upper quadrant pain that gets worse after eating  Patient states it is getting better since Thursday but there is still some discomfort there after she eats  Patient was given lab work of a CMP and a lipase  She is going for lab work for her nephrologist today which will also include is CBC  She was given  Ultrasound of the abdomen order as well  She was educated on acute abdomen symptoms and to not hesitate to go to the emergency room if the symptoms do occur  She agrees with the plan and understands

## 2018-02-06 NOTE — TELEPHONE ENCOUNTER
Called and spoke with patient regarding lab results of CMP, Lipase, and CBC which were all WNL these were completed 2/6/18 after visit today  She will get ultrasound done and we will follow up with those results

## 2018-02-13 ENCOUNTER — HOSPITAL ENCOUNTER (OUTPATIENT)
Dept: ULTRASOUND IMAGING | Facility: HOSPITAL | Age: 78
Discharge: HOME | End: 2018-02-13
Payer: MEDICARE

## 2018-02-13 DIAGNOSIS — R10.11 RIGHT UPPER QUADRANT ABDOMINAL PAIN: ICD-10-CM

## 2018-02-13 PROCEDURE — 76700 US EXAM ABDOM COMPLETE: CPT

## 2018-02-15 ENCOUNTER — TELEPHONE (OUTPATIENT)
Dept: FAMILY MEDICINE CLINIC | Facility: CLINIC | Age: 78
End: 2018-02-15

## 2018-02-15 DIAGNOSIS — R10.11 RIGHT UPPER QUADRANT ABDOMINAL PAIN: Primary | ICD-10-CM

## 2018-02-15 PROBLEM — K76.0 FATTY LIVER: Status: ACTIVE | Noted: 2018-02-15

## 2018-02-15 PROBLEM — D18.03 CAVERNOUS HEMANGIOMA OF LIVER: Status: ACTIVE | Noted: 2018-02-15

## 2018-02-15 NOTE — TELEPHONE ENCOUNTER
I called and spoke with Daniel Marlo today regarding her ultrasound results  Incidental findings such as hepatic cyst and hepatic hemangioma was discussed and educated to patient  Hemangioma has been visualized on multiple past CT scans that were stable  Fatty liver disease discussed with patient and ways to reduce cholesterol  She will be watching her diet more closely and exercising soon  She states this RUQ pain has completely resolved  She was educated that biliary dysfunction can still be present even when ultrasound and lab work is completely normal   She was given the option to see General surgery to discuss the benefits of doing a HIDA scan to further evaluate for any dysfunction  She states since she is feeling better now she is not interested in moving forward but if the pain occurs again she will call and consider seeing them and having the scan done

## 2018-05-22 ENCOUNTER — TELEPHONE (OUTPATIENT)
Dept: FAMILY MEDICINE CLINIC | Facility: CLINIC | Age: 78
End: 2018-05-22

## 2018-05-22 DIAGNOSIS — G62.9 PERIPHERAL POLYNEUROPATHY: ICD-10-CM

## 2018-05-22 DIAGNOSIS — E78.5 HYPERLIPIDEMIA, UNSPECIFIED HYPERLIPIDEMIA TYPE: Primary | ICD-10-CM

## 2018-05-22 NOTE — TELEPHONE ENCOUNTER
Pt called in she has an appt scheduled with you 6/5/2018 she would like if you can please order for her to get blood work done   CB# [phone]

## 2018-05-23 NOTE — TELEPHONE ENCOUNTER
The only 2 things that her do are lipid panel and TSH  All others were just updated in February    Use the diagnosis of hyperlipidemia and peripheral neuropathy

## 2018-05-27 ENCOUNTER — HOSPITAL ENCOUNTER (EMERGENCY)
Facility: HOSPITAL | Age: 78
Discharge: HOME | End: 2018-05-27
Attending: EMERGENCY MEDICINE
Payer: MEDICARE

## 2018-05-27 VITALS
OXYGEN SATURATION: 97 % | HEART RATE: 98 BPM | WEIGHT: 177.2 LBS | RESPIRATION RATE: 16 BRPM | TEMPERATURE: 99 F | DIASTOLIC BLOOD PRESSURE: 70 MMHG | BODY MASS INDEX: 33.48 KG/M2 | SYSTOLIC BLOOD PRESSURE: 160 MMHG

## 2018-05-27 DIAGNOSIS — Z23 RABIES, NEED FOR PROPHYLACTIC VACCINATION AGAINST: Primary | ICD-10-CM

## 2018-05-27 PROCEDURE — 90375 RABIES IG IM/SC: CPT | Performed by: PHYSICIAN ASSISTANT

## 2018-05-27 PROCEDURE — 96372 THER/PROPH/DIAG INJ SC/IM: CPT

## 2018-05-27 PROCEDURE — 99283 EMERGENCY DEPT VISIT LOW MDM: CPT

## 2018-05-27 PROCEDURE — 90675 RABIES VACCINE IM: CPT | Performed by: PHYSICIAN ASSISTANT

## 2018-05-27 PROCEDURE — 90471 IMMUNIZATION ADMIN: CPT

## 2018-05-27 RX ADMIN — RABIES VACCINE 1 ML: KIT at 16:51

## 2018-05-27 RX ADMIN — RABIES IMMUNE GLOBULIN (HUMAN) 1605 UNITS: 150 INJECTION INTRAMUSCULAR at 16:41

## 2018-05-27 NOTE — DISCHARGE INSTRUCTIONS
Rabies Vaccine   WHAT YOU NEED TO KNOW:   The rabies vaccine is an injection given to help prevent a rabies virus infection  The virus is spread to humans through the bite of an infected animal  Dogs, bats, skunks, coyotes, raccoons, and foxes are examples of animals that can carry rabies  The rabies vaccine can protect you from being infected with the virus  The vaccine can also prevent you from developing rabies even if you get it after you were bitten by an animal    DISCHARGE INSTRUCTIONS:   Call 911 for any of the following:   · Your mouth and throat are swollen  · You are wheezing or have trouble breathing  · You have chest pain or your heart is beating faster than normal for you  · You feel like you are going to faint  Return to the emergency department if:   · Your face is red or swollen  · You have hives that spread over your body  · You feel weak or dizzy  Contact your healthcare provider if:   · You have increased pain, redness, or swelling around the area where the shot was given  · You have questions or concerns about the rabies vaccine  Apply a warm compress  to the injection area as directed to decrease pain and swelling  Follow up with your healthcare provider as directed:  Write down your questions so you remember to ask them during your visits  © 2017 [facility] Information is for End User's use only and may not be sold, redistributed or otherwise used for commercial purposes  All illustrations and images included in CareNotes® are the copyrighted property of A D A M , Inc  or Corbin Mach[employer]  The above information is an  only  It is not intended as medical advice for individual conditions or treatments  Talk to your doctor, nurse or pharmacist before following any medical regimen to see if it is safe and effective for you  FOLLOW UP WITH YOUR PRIMARY CARE PROVIDER OR THE [facility]   MAKE AN APPOINTMENT TO BE SEEN     YOU NEED TO RETURN ON Wednesday MAY 30TH, Sunday June 3RD AND Sunday June 10TH FOR YOUR REPEAT RABIES DOSES  IF SYMPTOMS WORSEN OR NEW SYMPTOMS ARISE, RETURN TO THE ER TO BE SEEN

## 2018-05-27 NOTE — ED PROVIDER NOTES
History  Chief Complaint   Patient presents with   [place] or Exposure     Patient reports she just found out she has bats in her house  Concerned for rabies  77y  o female with PMH of HLD and thyroid disease presents to the ER for rabies series  Patient states she found two bats in her house since Wednesday  She does not believe she was bit but still wants the rabies series  She denies fever, chills, chest pain, dyspnea, N/V/D, abdominal pain, weakness or paresthesias  Patient's last tetanus was within the last 10 years  History provided by:  Patient   used: No        Prior to Admission Medications   Prescriptions Last Dose Informant Patient Reported? Taking? Ascorbic Acid (VITAMIN C) 100 MG tablet   Yes No   Sig: Take by mouth   Cholecalciferol (VITAMIN D) 2000 units CAPS   Yes No   Sig: Take 1 tablet by mouth daily   Multiple Vitamins-Minerals (MULTIVITAMIN ADULT PO)   Yes No   Sig: Take by mouth daily   albuterol (PROAIR HFA) 90 mcg/act inhaler   Yes No   Sig: Inhale 2 puffs   bimatoprost (LUMIGAN) 0 01 % ophthalmic drops   Yes No   Sig: Apply 1 drop to eye   levothyroxine 50 mcg tablet   Yes Yes   Sig: Take 1 tablet by mouth daily   pravastatin (PRAVACHOL) 20 mg tablet   Yes Yes   Sig: Take 1 tablet by mouth daily      Facility-Administered Medications: None       Past Medical History:   Diagnosis Date    Disease of thyroid gland     Hyperlipidemia        Past Surgical History:   Procedure Laterality Date    DILATION AND CURETTAGE, DIAGNOSTIC / THERAPEUTIC      TONSILLECTOMY      TUBAL LIGATION         History reviewed  No pertinent family history  I have reviewed and agree with the history as documented  Social History   Substance Use Topics    Smoking status: Former Smoker    Smokeless tobacco: Not on file    Alcohol use 0 6 oz/week     1 Glasses of wine per week        Review of Systems   Constitutional: Negative for chills and fever     Eyes: Negative for redness  Respiratory: Negative for shortness of breath  Cardiovascular: Negative for chest pain  Gastrointestinal: Negative for abdominal pain, diarrhea, nausea and vomiting  Musculoskeletal: Negative for neck stiffness  Skin: Negative for rash  Allergic/Immunologic: Negative for food allergies  Neurological: Negative for weakness and numbness  Physical Exam  Physical Exam   Constitutional:  Non-toxic appearance  No distress  HENT:   Head: Normocephalic and atraumatic  Right Ear: Tympanic membrane, external ear and ear canal normal  No drainage, swelling or tenderness  No foreign bodies  Tympanic membrane is not erythematous  No hemotympanum  Left Ear: Tympanic membrane, external ear and ear canal normal  No drainage, swelling or tenderness  No foreign bodies  Tympanic membrane is not erythematous  No hemotympanum  Nose: Nose normal    Mouth/Throat: Uvula is midline, oropharynx is clear and moist and mucous membranes are normal  No uvula swelling  No posterior oropharyngeal edema, posterior oropharyngeal erythema or tonsillar abscesses  No tonsillar exudate  Neck: Normal range of motion and phonation normal  Neck supple  No tracheal deviation present  Cardiovascular: Normal rate, regular rhythm, S1 normal, S2 normal and normal heart sounds  Exam reveals no gallop and no friction rub  No murmur heard  Pulmonary/Chest: Effort normal and breath sounds normal  No respiratory distress  She has no decreased breath sounds  She has no wheezes  She has no rhonchi  She has no rales  She exhibits no tenderness  Neurological: She is alert  GCS eye subscore is 4  GCS verbal subscore is 5  GCS motor subscore is 6  Skin: Skin is warm and dry  No rash noted  Psychiatric: She has a normal mood and affect  Nursing note and vitals reviewed        Vital Signs  ED Triage Vitals   Temperature Pulse Respirations Blood Pressure SpO2   05/27/18 1522 05/27/18 1518 05/27/18 1518 05/27/18 1518 05/27/18 1518   99 °F (37 2 °C) 98 16 160/70 97 %      Temp Source Heart Rate Source Patient Position - Orthostatic VS BP Location FiO2 (%)   05/27/18 1522 05/27/18 1518 05/27/18 1518 05/27/18 1518 --   Temporal Monitor Sitting Left arm       Pain Score       05/27/18 1518       No Pain           Vitals:    05/27/18 1518   BP: 160/70   Pulse: 98   Patient Position - Orthostatic VS: Sitting       Visual Acuity      ED Medications  Medications   rabies immune globulin, human (IMOGAM RABIES-HT) IM injection 1,605 Units (1,605 Units Intramuscular Given 5/27/18 1641)   rabies vaccine, chick embryo (RABAVERT) IM injection 1 mL (1 mL Intramuscular Given 5/27/18 1651)       Diagnostic Studies  Results Reviewed     None                 No orders to display              Procedures  Procedures       Phone Contacts  ED Phone Contact    ED Course                               MDM  Number of Diagnoses or Management Options  Rabies, need for prophylactic vaccination against: new and does not require workup  Diagnosis management comments: DDX consists of but not limited to: rabies series    Will give rabies series  At discharge, I instructed the patient to:  -follow up with pcp  -return on days 3,7 and 14 for next doses  -return to the ER if symptoms worsened or new symptoms arose  Patient agreed to this plan and was stable at time of discharge  Patient Progress  Patient progress: stable    CritCare Time    Disposition  Final diagnoses:   Rabies, need for prophylactic vaccination against     Time reflects when diagnosis was documented in both MDM as applicable and the Disposition within this note     Time User Action Codes Description Comment    5/27/2018  3:44 PM Shayna FARR Add [Z23] Rabies, need for prophylactic vaccination against       ED Disposition     ED Disposition Condition Comment    Discharge  Angelina Patricio discharge to home/self care      Condition at discharge: Stable        Follow-up Information Follow up With Specialties Details Why Contact Myles Patel DO Family Medicine Schedule an appointment as soon as possible for a visit in 1 day  [address]   [phone]            Discharge Medication List as of 5/27/2018  3:47 PM      CONTINUE these medications which have NOT CHANGED    Details   levothyroxine 50 mcg tablet Take 1 tablet by mouth daily, Starting Tue 1/31/2017, Historical Med      pravastatin (PRAVACHOL) 20 mg tablet Take 1 tablet by mouth daily, Starting Sun 12/11/2016, Historical Med      albuterol (PROAIR HFA) 90 mcg/act inhaler Inhale 2 puffs, Historical Med      Ascorbic Acid (VITAMIN C) 100 MG tablet Take by mouth, Historical Med      bimatoprost (LUMIGAN) 0 01 % ophthalmic drops Apply 1 drop to eye, Historical Med      Cholecalciferol (VITAMIN D) 2000 units CAPS Take 1 tablet by mouth daily, Historical Med      Multiple Vitamins-Minerals (MULTIVITAMIN ADULT PO) Take by mouth daily, Historical Med           No discharge procedures on file      ED Provider  Electronically Signed by           Emily Garcia PA-C  05/27/18 4672

## 2018-05-29 ENCOUNTER — TELEPHONE (OUTPATIENT)
Dept: FAMILY MEDICINE CLINIC | Facility: CLINIC | Age: 78
End: 2018-05-29

## 2018-05-29 NOTE — TELEPHONE ENCOUNTER
As long as she is following the protocol from the hospital I think it is okay for her to keep her appointment June 6

## 2018-05-29 NOTE — TELEPHONE ENCOUNTER
Patient called and stated she has an infestation of bats in her house  On Sunday 05/27/2018 she went to the hospital and got a series of 13 shots  She has the second round of shots scheduled for Wednesday 05/30/2018  She has an appt scheduled with you on 06/06/2018, asking if she should see you sooner or keep that appt

## 2018-05-30 ENCOUNTER — APPOINTMENT (OUTPATIENT)
Dept: LAB | Facility: CLINIC | Age: 78
End: 2018-05-30
Payer: MEDICARE

## 2018-05-30 ENCOUNTER — HOSPITAL ENCOUNTER (EMERGENCY)
Facility: HOSPITAL | Age: 78
Discharge: HOME | End: 2018-05-30
Attending: EMERGENCY MEDICINE
Payer: MEDICARE

## 2018-05-30 VITALS
DIASTOLIC BLOOD PRESSURE: 74 MMHG | SYSTOLIC BLOOD PRESSURE: 187 MMHG | OXYGEN SATURATION: 95 % | RESPIRATION RATE: 16 BRPM | BODY MASS INDEX: 33.44 KG/M2 | HEART RATE: 69 BPM | TEMPERATURE: 96.7 F | WEIGHT: 177 LBS

## 2018-05-30 DIAGNOSIS — Z23 NEED FOR IMMUNIZATION AGAINST RABIES: Primary | ICD-10-CM

## 2018-05-30 DIAGNOSIS — E78.5 HYPERLIPIDEMIA, UNSPECIFIED HYPERLIPIDEMIA TYPE: ICD-10-CM

## 2018-05-30 DIAGNOSIS — G62.9 PERIPHERAL POLYNEUROPATHY: ICD-10-CM

## 2018-05-30 LAB
CHOLEST SERPL-MCNC: 191 MG/DL (ref 50–200)
HDLC SERPL-MCNC: 65 MG/DL (ref 40–60)
LDLC SERPL CALC-MCNC: 111 MG/DL (ref 0–100)
NONHDLC SERPL-MCNC: 126 MG/DL
TRIGL SERPL-MCNC: 74 MG/DL
TSH SERPL DL<=0.05 MIU/L-ACNC: 1.26 UIU/ML (ref 0.36–3.74)

## 2018-05-30 PROCEDURE — 36415 COLL VENOUS BLD VENIPUNCTURE: CPT

## 2018-05-30 PROCEDURE — 99283 EMERGENCY DEPT VISIT LOW MDM: CPT

## 2018-05-30 PROCEDURE — 90675 RABIES VACCINE IM: CPT | Performed by: EMERGENCY MEDICINE

## 2018-05-30 PROCEDURE — 84443 ASSAY THYROID STIM HORMONE: CPT

## 2018-05-30 PROCEDURE — 80061 LIPID PANEL: CPT

## 2018-05-30 PROCEDURE — 90471 IMMUNIZATION ADMIN: CPT

## 2018-05-30 RX ADMIN — RABIES VACCINE 1 ML: KIT at 10:23

## 2018-05-30 NOTE — DISCHARGE INSTRUCTIONS
Rabies Vaccine   WHAT YOU NEED TO KNOW:   The rabies vaccine is an injection given to help prevent a rabies virus infection  The virus is spread to humans through the bite of an infected animal  Dogs, bats, skunks, coyotes, raccoons, and foxes are examples of animals that can carry rabies  The rabies vaccine can protect you from being infected with the virus  The vaccine can also prevent you from developing rabies even if you get it after you were bitten by an animal    DISCHARGE INSTRUCTIONS:   Call 911 for any of the following:   · Your mouth and throat are swollen  · You are wheezing or have trouble breathing  · You have chest pain or your heart is beating faster than normal for you  · You feel like you are going to faint  Return to the emergency department if:   · Your face is red or swollen  · You have hives that spread over your body  · You feel weak or dizzy  Contact your healthcare provider if:   · You have increased pain, redness, or swelling around the area where the shot was given  · You have questions or concerns about the rabies vaccine  Apply a warm compress  to the injection area as directed to decrease pain and swelling  Follow up with your healthcare provider as directed:  Write down your questions so you remember to ask them during your visits  © 2017 [facility] Information is for End User's use only and may not be sold, redistributed or otherwise used for commercial purposes  All illustrations and images included in CareNotes® are the copyrighted property of [employer]  or [employer]bin Mach[employer]  The above information is an  only  It is not intended as medical advice for individual conditions or treatments  Talk to your doctor, nurse or pharmacist before following any medical regimen to see if it is safe and effective for you

## 2018-05-30 NOTE — ED PROVIDER NOTES
Final Diagnosis:  1  Need for immunization against rabies      Chief Complaint   Patient presents with    Rabies Test     Patient reports she is here for 2nd shot in the rabies series  This is a 78-year-old female presenting for evaluation of rabies exposure  The patient states that there are multiple bats in her house  She started the rabies series already, here for 2nd shot  Denies f/ch/n/v/cp/sob  Tetanus up to date   PE:   Vitals:    05/30/18 1001   BP: (!) 187/74   BP Location: Left arm   Pulse: 69   Resp: 16   Temp: (!) 96 7 °F (35 9 °C)   TempSrc: Temporal   SpO2: 95%   Weight: 80 3 kg (177 lb)   General: VS reviewed  Appears in NAD  awake, alert  Well-nourished, well-developed  Appears stated age  Speaking normally in full sentences  Head: Normocephalic, atraumatic, nontender  Eyes: EOM-I  No diplopia  No hyphema  No subconjunctival hemorrhages  Symmetrical lids  ENT: Atraumatic external nose and ears  MMM  No malocclusion  No stridor  Normal phonation  No drooling  Normal swallowing  Neck: No JVD  CV: No pallor noted  Peripheral pulses +2 throughout  No chest wall tenderness  Lungs:   No tachypnea  No respiratory distress  MSK:   FROM   Skin: Dry, intact  Neuro: Awake, alert, GCS15, CN II-XII grossly intact  Motor grossly intact  Psychiatric/Behavioral: Appropriate mood and affect   Exam: deferred  A:  - rabies series  P:  - The patient has been exposed to rabies and has never been vaccinated against rabies  Therefore the patient should get 4 doses of rabies vaccine - one dose right away, and additional doses on the 3rd, 7th, and 14th days  (Rabjeronimot, 1 Kit)  Four doses (1 mL each) of either HDCV or PCECV vaccine should be administered on days 0, 3, 7, and 14  The first dose should be administered as soon as possible after exposure  It should be given intramuscularly into the deltoid muscle of adults   In children, it should be administered into either the deltoid muscle or the anterolateral aspect of the thigh  Will not use the gluteal region, because this could result in a decreased immunologic response  - They should also get Rabies Immune Globulin at the same time as the first dose (Rabies immunoglobulin; 20 IU/kg)  If not immediately available, the HRIG should be administered as soon as it becomes available up until and including day 7 of treatment  Concentrate as much of the dose as possible in and around the wound (if wound location allows)  The remaining HRIG should be administered intramuscularly at a site distance from the vaccine administration  Case reports have documented safe administration of HRIG and HDCV during pregnancy  - If immunosuppressed, give a dose on day 28  - Red flags for rabies reviewed  - 13 point ROS was performed and all are normal unless stated in the history above  - Nursing note reviewed  Vitals reviewed  - Orders placed by myself and/or advanced practitioner / resident     - Previous chart was reviewed  - No language barrier    - History obtained from patient  - There are no limitations to the history obtained  - Critical care time: Not applicable for this patient  Medications   rabies vaccine, chick embryo (RABAVERT) IM injection 1 mL (1 mL Intramuscular Given 5/30/18 1023)     No orders to display     No orders of the defined types were placed in this encounter  Labs Reviewed - No data to display  Time reflects when diagnosis was documented in both MDM as applicable and the Disposition within this note     Time User Action Codes Description Comment    5/30/2018 10:07 AM Katia Ocasio Add [Z23] Need for immunization against rabies       ED Disposition     ED Disposition Condition Comment    Discharge  Sherie Mejia discharge to home/self care      Condition at discharge: Good        Follow-up Information     Follow up With Specialties Details Why Contact Info Additional [address] [facility] Emergency Department Emergency Medicine Go to If symptoms worsen [address] [facility]  [phone] [place] ED, [address]  , [facility], [place], [address] [facility] [address], DO Family Medicine Call in 1 day To make appointment for re-evaluation in 1 week [address] [place] [address]  [phone]           Patient's Medications   Discharge Prescriptions    No medications on file     No discharge procedures on file  Prior to Admission Medications   Prescriptions Last Dose Informant Patient Reported? Taking? Ascorbic Acid (VITAMIN C) 100 MG tablet   Yes No   Sig: Take 100 mg by mouth daily     Cholecalciferol (VITAMIN D) 2000 units CAPS   Yes No   Sig: Take 1 tablet by mouth daily   Multiple Vitamins-Minerals (MULTIVITAMIN ADULT PO)   Yes No   Sig: Take by mouth daily   albuterol (PROAIR HFA) 90 mcg/act inhaler   Yes No   Sig: Inhale 2 puffs every 4 (four) hours as needed     bimatoprost (LUMIGAN) 0 01 % ophthalmic drops   Yes No   Sig: Administer 1 drop to both eyes daily at bedtime     levothyroxine 50 mcg tablet   Yes No   Sig: Take 1 tablet by mouth daily   pravastatin (PRAVACHOL) 20 mg tablet   Yes No   Sig: Take 1 tablet by mouth daily      Facility-Administered Medications: None       Portions of the record may have been created with voice recognition software  Occasional wrong word or "sound a like" substitutions may have occurred due to the inherent limitations of voice recognition software  Read the chart carefully and recognize, using context, where substitutions have occurred      Electronically signed by:  Samy Moralez MD  05/30/18 5318

## 2018-06-03 ENCOUNTER — HOSPITAL ENCOUNTER (EMERGENCY)
Facility: HOSPITAL | Age: 78
Discharge: HOME | End: 2018-06-03
Attending: EMERGENCY MEDICINE | Admitting: EMERGENCY MEDICINE
Payer: MEDICARE

## 2018-06-03 VITALS
BODY MASS INDEX: 33.44 KG/M2 | WEIGHT: 177 LBS | OXYGEN SATURATION: 98 % | HEART RATE: 72 BPM | TEMPERATURE: 97.7 F | RESPIRATION RATE: 18 BRPM | DIASTOLIC BLOOD PRESSURE: 71 MMHG | SYSTOLIC BLOOD PRESSURE: 157 MMHG

## 2018-06-03 DIAGNOSIS — Z23 ENCOUNTER FOR REPEAT ADMINISTRATION OF RABIES VACCINATION: Primary | ICD-10-CM

## 2018-06-03 PROCEDURE — 90675 RABIES VACCINE IM: CPT | Performed by: EMERGENCY MEDICINE

## 2018-06-03 PROCEDURE — 90471 IMMUNIZATION ADMIN: CPT

## 2018-06-03 PROCEDURE — 99281 EMR DPT VST MAYX REQ PHY/QHP: CPT

## 2018-06-03 RX ADMIN — RABIES VACCINE 1 ML: KIT at 09:45

## 2018-06-03 NOTE — DISCHARGE INSTRUCTIONS
Rabies Vaccine   WHAT YOU NEED TO KNOW:   The rabies vaccine is an injection given to help prevent a rabies virus infection  The virus is spread to humans through the bite of an infected animal  Dogs, bats, skunks, coyotes, raccoons, and foxes are examples of animals that can carry rabies  The rabies vaccine can protect you from being infected with the virus  The vaccine can also prevent you from developing rabies even if you get it after you were bitten by an animal    DISCHARGE INSTRUCTIONS:   Call 911 for any of the following:   · Your mouth and throat are swollen  · You are wheezing or have trouble breathing  · You have chest pain or your heart is beating faster than normal for you  · You feel like you are going to faint  Return to the emergency department if:   · Your face is red or swollen  · You have hives that spread over your body  · You feel weak or dizzy  Contact your healthcare provider if:   · You have increased pain, redness, or swelling around the area where the shot was given  · You have questions or concerns about the rabies vaccine  Apply a warm compress  to the injection area as directed to decrease pain and swelling  Follow up with your healthcare provider as directed:  Write down your questions so you remember to ask them during your visits  © 2017 [address] [facility] Information is for End User's use only and may not be sold, redistributed or otherwise used for commercial purposes  All illustrations and images included in CareNotes® are the copyrighted property of A [employer]  or [employer]bin Machado  The above information is an  only  It is not intended as medical advice for individual conditions or treatments  Talk to your doctor, nurse or pharmacist before following any medical regimen to see if it is safe and effective for you

## 2018-06-03 NOTE — ED PROVIDER NOTES
History  Chief Complaint   Patient presents with    Follow Up Rabies     3rd of series  Original bat exposure no bite  69-year-old female presents for continuation of rabies series  The patient was initially evaluated in our emergency department on 05/27/18  At that time she stated that she had had two Bruno in her house for few days  She did not think she been bit but requested a rabies series  She received her 1st dose on 05/27/2018 under 2nd on 05/30/2017  She is now here for her 3rd shot in the series  She denies any symptoms  This is been constant without radiation  Social and family history are noncontributory  History provided by:  Patient   used: No        Prior to Admission Medications   Prescriptions Last Dose Informant Patient Reported? Taking? Ascorbic Acid (VITAMIN C) 100 MG tablet 6/2/2018 at Unknown time  Yes Yes   Sig: Take 100 mg by mouth daily     Cholecalciferol (VITAMIN D) 2000 units CAPS 6/2/2018 at Unknown time  Yes Yes   Sig: Take 1 tablet by mouth daily   Multiple Vitamins-Minerals (MULTIVITAMIN ADULT PO) 6/2/2018 at Unknown time  Yes Yes   Sig: Take by mouth daily   albuterol (PROAIR HFA) 90 mcg/act inhaler 6/2/2018 at Unknown time  Yes Yes   Sig: Inhale 2 puffs every 4 (four) hours as needed     bimatoprost (LUMIGAN) 0 01 % ophthalmic drops 6/2/2018 at Unknown time  Yes Yes   Sig: Administer 1 drop to both eyes daily at bedtime     levothyroxine 50 mcg tablet 6/3/2018 at Unknown time  Yes Yes   Sig: Take 1 tablet by mouth daily   pravastatin (PRAVACHOL) 20 mg tablet 6/2/2018 at Unknown time  Yes Yes   Sig: Take 1 tablet by mouth daily      Facility-Administered Medications: None       Past Medical History:   Diagnosis Date    Disease of thyroid gland     Hyperlipidemia        Past Surgical History:   Procedure Laterality Date    DILATION AND CURETTAGE, DIAGNOSTIC / THERAPEUTIC      TONSILLECTOMY      TUBAL LIGATION         History reviewed   No pertinent family history  I have reviewed and agree with the history as documented  Social History   Substance Use Topics    Smoking status: Former Smoker    Smokeless tobacco: Never Used    Alcohol use 0 6 oz/week     1 Glasses of wine per week        Review of Systems   Constitutional: Negative for activity change, appetite change, fatigue, fever and unexpected weight change  HENT: Negative for congestion, hearing loss, rhinorrhea, sore throat and voice change  Eyes: Negative for pain and visual disturbance  Respiratory: Negative for apnea, cough, chest tightness and shortness of breath  Cardiovascular: Negative for chest pain  Gastrointestinal: Negative for abdominal pain, blood in stool, diarrhea, nausea and vomiting  Endocrine: Negative for polyphagia and polyuria  Genitourinary: Negative for difficulty urinating, dysuria, flank pain, frequency and urgency  Musculoskeletal: Negative for back pain, gait problem, neck pain and neck stiffness  Skin: Negative for color change and rash  Neurological: Negative for dizziness, syncope, speech difficulty, light-headedness, numbness and headaches  Psychiatric/Behavioral: Negative for confusion and decreased concentration  Physical Exam  Physical Exam   Constitutional: She is oriented to person, place, and time  She appears well-developed and well-nourished  HENT:   Head: Normocephalic and atraumatic  Eyes: Conjunctivae and EOM are normal  Pupils are equal, round, and reactive to light  No scleral icterus  Neck: Normal range of motion  Neck supple  No tracheal deviation present  Cardiovascular: Normal rate and regular rhythm  Pulmonary/Chest: Effort normal and breath sounds normal  No respiratory distress  Abdominal: Soft  She exhibits no distension  There is no tenderness  There is no rebound and no guarding  Musculoskeletal: Normal range of motion  She exhibits no tenderness or deformity     Lymphadenopathy:     She has no cervical adenopathy  Neurological: She is alert and oriented to person, place, and time  No gross focal sensory or motor deficits   Skin: Skin is warm and dry  No rash noted  She is not diaphoretic  Psychiatric: She has a normal mood and affect  Vitals reviewed  Vital Signs  ED Triage Vitals [06/03/18 0918]   Temperature Pulse Respirations Blood Pressure SpO2   97 7 °F (36 5 °C) 72 18 157/71 98 %      Temp Source Heart Rate Source Patient Position - Orthostatic VS BP Location FiO2 (%)   Temporal -- -- -- --      Pain Score       No Pain           Vitals:    06/03/18 0918   BP: 157/71   Pulse: 72       Visual Acuity      ED Medications  Medications   rabies vaccine, chick embryo (RABAVERT) IM injection 1 mL (not administered)       Diagnostic Studies  Results Reviewed     None                 No orders to display              Procedures  Procedures       Phone Contacts  ED Phone Contact    ED Course                               MDM  Number of Diagnoses or Management Options  Encounter for repeat administration of rabies vaccination: new and requires workup  Diagnosis management comments: 74-year-old female presented for the 3rd shot of rabies series  No complaints today  She will return in one week for her final install meant  She does have an appointment to see her primary physician on 06/06/2018         Amount and/or Complexity of Data Reviewed  Review and summarize past medical records: yes      CritCare Time    Disposition  Final diagnoses:   Encounter for repeat administration of rabies vaccination     Time reflects when diagnosis was documented in both MDM as applicable and the Disposition within this note     Time User Action Codes Description Comment    6/3/2018  9:32 AM Rolanda Levi Add [Z23] Encounter for repeat administration of rabies vaccination       ED Disposition     None      Follow-up Information     Follow up With Specialties Details Why Contact Info Additional Information [id number]  Emergency Department Emergency Medicine   [address] [facility]  [phone] [place] ED, [address]  , [place], [place], [address]          Patient's Medications   Discharge Prescriptions    No medications on file     No discharge procedures on file      ED Provider  Electronically Signed by           Kike De La Rosa MD  06/03/18 9406

## 2018-06-06 ENCOUNTER — OFFICE VISIT (OUTPATIENT)
Dept: FAMILY MEDICINE CLINIC | Facility: CLINIC | Age: 78
End: 2018-06-06
Payer: MEDICARE

## 2018-06-06 VITALS
DIASTOLIC BLOOD PRESSURE: 76 MMHG | HEIGHT: 61 IN | SYSTOLIC BLOOD PRESSURE: 132 MMHG | WEIGHT: 176.2 LBS | BODY MASS INDEX: 33.27 KG/M2

## 2018-06-06 DIAGNOSIS — E03.9 ADULT HYPOTHYROIDISM: Primary | ICD-10-CM

## 2018-06-06 DIAGNOSIS — Z23 NEED FOR SHINGLES VACCINE: ICD-10-CM

## 2018-06-06 DIAGNOSIS — E78.01 FAMILIAL HYPERCHOLESTEROLEMIA: ICD-10-CM

## 2018-06-06 DIAGNOSIS — Z20.3 CONTACT WITH AND (SUSPECTED) EXPOSURE TO RABIES: ICD-10-CM

## 2018-06-06 PROBLEM — J45.909 ASTHMA: Status: ACTIVE | Noted: 2018-06-06

## 2018-06-06 PROBLEM — N85.9 DISORDER OF UTERUS: Status: ACTIVE | Noted: 2018-06-06

## 2018-06-06 PROBLEM — N18.30 CHRONIC RENAL INSUFFICIENCY, STAGE III (MODERATE) (HCC): Status: ACTIVE | Noted: 2018-06-06

## 2018-06-06 PROBLEM — M35.9 AUTOIMMUNE DISEASE (HCC): Status: ACTIVE | Noted: 2018-06-06

## 2018-06-06 PROBLEM — N84.0 POLYP OF CORPUS UTERI: Status: ACTIVE | Noted: 2018-06-06

## 2018-06-06 PROBLEM — E78.9 DISORDER OF LIPID METABOLISM: Status: ACTIVE | Noted: 2018-06-06

## 2018-06-06 PROCEDURE — 99214 OFFICE O/P EST MOD 30 MIN: CPT | Performed by: FAMILY MEDICINE

## 2018-06-06 NOTE — PROGRESS NOTES
Assessment/Plan:     Diagnoses and all orders for this visit:    Adult hypothyroidism    Familial hypercholesterolemia    Need for shingles vaccine  -     Zoster Vac Recomb Adjuvanted ([address]) 50 MCG SUSR; Inject 50 mcg into the shoulder, thigh, or buttocks once for 1 dose    Contact with and (suspected) exposure to rabies         patient's current diagnosis of hypothyroidism and hyperlipidemia are well controlled  Continue same regimen  Follow-up in the fall for adult Medicare wellness exam and flu shot  Time spent with patient 25+ minutes in reviewing her recent medical events as well as greater than  50% counseling performed  Subjective:   Chief Complaint   Patient presents with    Follow-up     pt stated that she had bats in her house and had to get rabies shots would like to discuss this    Blood work     discuss results in chart      Patient ID: Steffen De León is a 65 y o  female  Patient is a pleasant 79-year-old female who is here to review blood work  She unfortunately had an experience with the exposure to bats in her house  By the healthcare protocol unfortunately she did require the rabies vaccination series and will be finishing them up over the next weeks  She will be trying to sell her house after the repairs are done  She is currently living with friends  She has seen Orthopedics   [facility] and will be starting physical therapy  Her back is doing fairly well  She sees Dr Ninfa Brewer for pain management  Patient has had Zostavax prior and she is interested in trying to find out if the new shingles shot is reasonably covered    Her lab work was reviewed her cholesterol is excellent    Her thyroid is at goal         The following portions of the patient's history were reviewed and updated as appropriate: allergies, current medications, past family history, past medical history, past social history, past surgical history and problem list       Review of Systems   Constitutional: Patient is stressed over recent issue with back and the rabies series administer to [address] [facility]   HENT: Positive for postnasal drip  Eyes: Negative for visual disturbance  Respiratory: Negative for cough and choking  Cardiovascular: Negative for chest pain  Gastrointestinal: Negative  Genitourinary: Negative  Musculoskeletal: Positive for arthralgias  Neurological: Negative  Psychiatric/Behavioral: Negative  Stressed         Objective:  /76   Ht 5' 1" (1 549 m)   Wt 79 9 kg (176 lb 3 2 oz)   BMI 33 29 kg/m²     Component      Latest Ref Rng & Units 5/30/2018   Cholesterol      50 - 200 mg/dL 191   Triglycerides      <=150 mg/dL 74   HDL      40 - 60 mg/dL 65 (H)   LDL Calculated      0 - 100 mg/dL 111 (H)      Physical Exam   Constitutional: She is oriented to person, place, and time  She appears well-developed and well-nourished  Pleasant 40-year-old female who looks younger than her stated age with a BMI of 33%   Neck: Normal range of motion  Cardiovascular: Normal rate  Abdominal: Soft  Musculoskeletal:   Some crepitus in knees   Neurological: She is alert and oriented to person, place, and time  Psychiatric: Her behavior is normal  Judgment and thought content normal  Her mood appears anxious

## 2018-06-10 ENCOUNTER — HOSPITAL ENCOUNTER (EMERGENCY)
Facility: HOSPITAL | Age: 78
Discharge: HOME | End: 2018-06-10
Attending: EMERGENCY MEDICINE
Payer: MEDICARE

## 2018-06-10 VITALS
WEIGHT: 176.15 LBS | DIASTOLIC BLOOD PRESSURE: 85 MMHG | HEART RATE: 69 BPM | BODY MASS INDEX: 33.28 KG/M2 | SYSTOLIC BLOOD PRESSURE: 157 MMHG | RESPIRATION RATE: 18 BRPM | TEMPERATURE: 97.4 F | OXYGEN SATURATION: 97 %

## 2018-06-10 DIAGNOSIS — Z23 NEED FOR IMMUNIZATION AGAINST RABIES: Primary | ICD-10-CM

## 2018-06-10 PROCEDURE — 90675 RABIES VACCINE IM: CPT | Performed by: PHYSICIAN ASSISTANT

## 2018-06-10 PROCEDURE — 99281 EMR DPT VST MAYX REQ PHY/QHP: CPT

## 2018-06-10 PROCEDURE — 90471 IMMUNIZATION ADMIN: CPT

## 2018-06-10 RX ADMIN — RABIES VACCINE 1 ML: KIT at 10:01

## 2018-06-10 NOTE — DISCHARGE INSTRUCTIONS
Rabies Vaccine   WHAT YOU NEED TO KNOW:   The rabies vaccine is an injection given to help prevent a rabies virus infection  The virus is spread to humans through the bite of an infected animal  Dogs, bats, skunks, coyotes, raccoons, and foxes are examples of animals that can carry rabies  The rabies vaccine can protect you from being infected with the virus  The vaccine can also prevent you from developing rabies even if you get it after you were bitten by an animal    DISCHARGE INSTRUCTIONS:   Call 911 for any of the following:   · Your mouth and throat are swollen  · You are wheezing or have trouble breathing  · You have chest pain or your heart is beating faster than normal for you  · You feel like you are going to faint  Return to the emergency department if:   · Your face is red or swollen  · You have hives that spread over your body  · You feel weak or dizzy  Contact your healthcare provider if:   · You have increased pain, redness, or swelling around the area where the shot was given  · You have questions or concerns about the rabies vaccine  Apply a warm compress  to the injection area as directed to decrease pain and swelling  Follow up with your healthcare provider as directed:  Write down your questions so you remember to ask them during your visits  © 2017 [facility] Information is for End User's use only and may not be sold, redistributed or otherwise used for commercial purposes  All illustrations and images included in CareNotes® are the copyrighted property of [employer]  or [employer]  The above information is an  only  It is not intended as medical advice for individual conditions or treatments  Talk to your doctor, nurse or pharmacist before following any medical regimen to see if it is safe and effective for you  DISCHARGE INSTRUCTIONS:    FOLLOW UP WITH YOUR PRIMARY CARE PROVIDER OR THE [address] [place] [address] AN APPOINTMENT TO BE SEEN  IF SYMPTOMS WORSEN OR NEW SYMPTOMS ARISE, RETURN TO THE ER TO BE SEEN

## 2018-06-10 NOTE — ED PROVIDER NOTES
History  Chief Complaint   Patient presents with    Follow Up Rabies     Pt here for last shot in the rabies series  States that bats were entering house through a hole in the ceiling       77y  o female with PMH of thyroid disease and HLD presents to the ER for rabies vaccine  Patient was seen on 5/27 after she found two bats in her house  She did not believe she was bitten but wanted to complete the rabies series just incase  She denies any symptoms currently  She denies fever, chills, chest pain, dyspnea, N/V/D, abdominal pain, weakness or paresthesias  History provided by:  Patient   used: No        Prior to Admission Medications   Prescriptions Last Dose Informant Patient Reported? Taking? Ascorbic Acid (VITAMIN C) 100 MG tablet   Yes No   Sig: Take 100 mg by mouth daily     Cholecalciferol (VITAMIN D) 2000 units CAPS   Yes No   Sig: Take 1 tablet by mouth daily   Multiple Vitamins-Minerals (MULTIVITAMIN ADULT PO)   Yes No   Sig: Take by mouth daily   albuterol (PROAIR HFA) 90 mcg/act inhaler   Yes No   Sig: Inhale 2 puffs every 4 (four) hours as needed     bimatoprost (LUMIGAN) 0 01 % ophthalmic drops   Yes No   Sig: Administer 1 drop to both eyes daily at bedtime     levothyroxine 50 mcg tablet   Yes No   Sig: Take 1 tablet by mouth daily   pravastatin (PRAVACHOL) 20 mg tablet   Yes No   Sig: Take 1 tablet by mouth daily      Facility-Administered Medications: None       Past Medical History:   Diagnosis Date    Disease of thyroid gland     Hyperlipidemia        Past Surgical History:   Procedure Laterality Date    DILATION AND CURETTAGE, DIAGNOSTIC / THERAPEUTIC      TONSILLECTOMY      TUBAL LIGATION         History reviewed  No pertinent family history  I have reviewed and agree with the history as documented      Social History   Substance Use Topics    Smoking status: Former Smoker    Smokeless tobacco: Never Used    Alcohol use 0 6 oz/week     1 Glasses of wine per week        Review of Systems   Constitutional: Negative for chills and fever  Eyes: Negative for redness  Respiratory: Negative for shortness of breath  Cardiovascular: Negative for chest pain  Gastrointestinal: Negative for abdominal pain, diarrhea, nausea and vomiting  Musculoskeletal: Negative for neck stiffness  Skin: Negative for rash  Allergic/Immunologic: Negative for food allergies  Neurological: Negative for weakness and numbness  Physical Exam  Physical Exam   Constitutional:  Non-toxic appearance  No distress  HENT:   Head: Normocephalic and atraumatic  Right Ear: Tympanic membrane, external ear and ear canal normal  No drainage, swelling or tenderness  No foreign bodies  Tympanic membrane is not erythematous  No hemotympanum  Left Ear: Tympanic membrane, external ear and ear canal normal  No drainage, swelling or tenderness  No foreign bodies  Tympanic membrane is not erythematous  No hemotympanum  Nose: Nose normal    Mouth/Throat: Uvula is midline, oropharynx is clear and moist and mucous membranes are normal  No uvula swelling  No posterior oropharyngeal edema, posterior oropharyngeal erythema or tonsillar abscesses  No tonsillar exudate  Neck: Normal range of motion and phonation normal  Neck supple  No tracheal deviation present  Cardiovascular: Normal rate, regular rhythm, S1 normal, S2 normal and normal heart sounds  Exam reveals no gallop and no friction rub  No murmur heard  Pulmonary/Chest: Effort normal and breath sounds normal  No respiratory distress  She has no decreased breath sounds  She has no wheezes  She has no rhonchi  She has no rales  She exhibits no tenderness  Neurological: She is alert  GCS eye subscore is 4  GCS verbal subscore is 5  GCS motor subscore is 6  Skin: Skin is warm and dry  No rash noted  Psychiatric: She has a normal mood and affect  Nursing note and vitals reviewed        Vital Signs  ED Triage Vitals [06/10/18 0943]   Temperature Pulse Respirations Blood Pressure SpO2   (!) 97 4 °F (36 3 °C) 69 18 157/85 97 %      Temp Source Heart Rate Source Patient Position - Orthostatic VS BP Location FiO2 (%)   Temporal Monitor Sitting Right arm --      Pain Score       No Pain           Vitals:    06/10/18 0943   BP: 157/85   Pulse: 69   Patient Position - Orthostatic VS: Sitting       Visual Acuity      ED Medications  Medications   rabies vaccine, chick embryo (RABAVERT) IM injection 1 mL (1 mL Intramuscular Given 6/10/18 1001)       Diagnostic Studies  Results Reviewed     None                 No orders to display              Procedures  Procedures       Phone Contacts  ED Phone Contact    ED Course                               MDM  Number of Diagnoses or Management Options  Need for immunization against rabies: established and improving  Diagnosis management comments: DDX consists of but not limited to: need for rabies vaccine    Will give Rabavert  At discharge, I instructed the patient to:  -follow up with pcp  -return to the ER if symptoms worsened or new symptoms arose  Patient agreed to this plan and was stable at time of discharge  Amount and/or Complexity of Data Reviewed  Review and summarize past medical records: yes    Patient Progress  Patient progress: stable    CritCare Time    Disposition  Final diagnoses:   Need for immunization against rabies     Time reflects when diagnosis was documented in both MDM as applicable and the Disposition within this note     Time User Action Codes Description Comment    6/10/2018 10:28 AM Staci FARR Add [Z23] Need for immunization against rabies       ED Disposition     ED Disposition Condition Comment    Discharge  Martha Doshi discharge to home/self care      Condition at discharge: Stable        Follow-up Information     Follow up With Specialties Details Why Contact Myles Santiago DO Family Medicine Schedule an appointment as soon as possible for a visit As needed [address]   [phone]            Patient's Medications   Discharge Prescriptions    No medications on file     No discharge procedures on file      ED Provider  Electronically Signed by           Mandy Garvin PA-C  06/10/18 1047

## 2018-07-27 DIAGNOSIS — E03.9 ADULT HYPOTHYROIDISM: Primary | ICD-10-CM

## 2018-07-27 RX ORDER — LEVOTHYROXINE SODIUM 0.05 MG/1
50 TABLET ORAL DAILY
Qty: 90 TABLET | Refills: 1 | Status: SHIPPED | OUTPATIENT
Start: 2018-07-27 | End: 2019-01-10 | Stop reason: SDUPTHER

## 2018-09-12 ENCOUNTER — OFFICE VISIT (OUTPATIENT)
Dept: URGENT CARE | Age: 78
End: 2018-09-12
Payer: MEDICARE

## 2018-09-12 VITALS
TEMPERATURE: 99.3 F | BODY MASS INDEX: 32.2 KG/M2 | OXYGEN SATURATION: 95 % | HEIGHT: 62 IN | WEIGHT: 175 LBS | RESPIRATION RATE: 16 BRPM | DIASTOLIC BLOOD PRESSURE: 65 MMHG | SYSTOLIC BLOOD PRESSURE: 142 MMHG | HEART RATE: 79 BPM

## 2018-09-12 DIAGNOSIS — J06.9 UPPER RESPIRATORY TRACT INFECTION, UNSPECIFIED TYPE: Primary | ICD-10-CM

## 2018-09-12 PROCEDURE — 99213 OFFICE O/P EST LOW 20 MIN: CPT | Performed by: PHYSICIAN ASSISTANT

## 2018-09-12 PROCEDURE — G0463 HOSPITAL OUTPT CLINIC VISIT: HCPCS | Performed by: PHYSICIAN ASSISTANT

## 2018-09-12 RX ORDER — FLUTICASONE PROPIONATE 50 MCG
2 SPRAY, SUSPENSION (ML) NASAL DAILY
Qty: 16 G | Refills: 0 | Status: SHIPPED | OUTPATIENT
Start: 2018-09-12 | End: 2019-01-18 | Stop reason: ALTCHOICE

## 2018-09-12 RX ORDER — CETIRIZINE HYDROCHLORIDE 10 MG/1
5 TABLET ORAL DAILY
Qty: 30 TABLET | Refills: 0 | Status: SHIPPED | OUTPATIENT
Start: 2018-09-12 | End: 2019-01-18 | Stop reason: ALTCHOICE

## 2018-09-12 RX ORDER — AZITHROMYCIN 250 MG/1
TABLET, FILM COATED ORAL
Qty: 6 TABLET | Refills: 0 | Status: SHIPPED | OUTPATIENT
Start: 2018-09-12 | End: 2018-09-17

## 2018-09-12 RX ORDER — BENZONATATE 100 MG/1
100 CAPSULE ORAL 3 TIMES DAILY PRN
Qty: 15 CAPSULE | Refills: 0 | Status: SHIPPED | OUTPATIENT
Start: 2018-09-12 | End: 2018-09-20 | Stop reason: ALTCHOICE

## 2018-09-12 NOTE — PATIENT INSTRUCTIONS
Take medications as directed  Motrin and/or Tylenol as needed for fevers or pain  Continue using normal medicines  Follow up with PCP in 3-5 days  Proceed to  ER if symptoms worsen  Upper Respiratory Infection   AMBULATORY CARE:   An upper respiratory infection  is also called a common cold  It can affect your nose, throat, ears, and sinuses  Common signs and symptoms include the following:  Cold symptoms are usually worst for the first 3 to 5 days  You may have any of the following:  · Runny or stuffy nose    · Sneezing and coughing    · Sore throat or hoarseness    · Red, watery, and sore eyes    · Fatigue     · Chills and fever    · Headache, body aches, or sore muscles  Seek care immediately if:   · You have chest pain or trouble breathing  Contact your healthcare provider if:   · You have a fever over 102ºF (39°C)  · Your sore throat gets worse or you see white or yellow spots in your throat  · Your symptoms get worse after 3 to 5 days or your cold is not better in 14 days  · You have a rash anywhere on your skin  · You have large, tender lumps in your neck  · You have thick, green or yellow drainage from your nose  · You cough up thick yellow, green, or bloody mucus  · You have vomiting for more than 24 hours and cannot keep fluids down  · You have a bad earache  · You have questions or concerns about your condition or care  Treatment for a cold: There is no cure for the common cold  Colds are caused by viruses and do not get better with antibiotics  Most people get better in 7 to 14 days  You may continue to cough for 2 to 3 weeks  The following may help decrease your symptoms:  · Decongestants  help reduce nasal congestion and help you breathe more easily  If you take decongestant pills, they may make you feel restless or not able to sleep  Do not use decongestant sprays for more than a few days  · Cough suppressants  help reduce coughing   Ask your healthcare provider which type of cough medicine is best for you  · NSAIDs , such as ibuprofen, help decrease swelling, pain, and fever  NSAIDs can cause stomach bleeding or kidney problems in certain people  If you take blood thinner medicine, always ask your healthcare provider if NSAIDs are safe for you  Always read the medicine label and follow directions  · Acetaminophen  decreases pain and fever  It is available without a doctor's order  Ask how much to take and how often to take it  Follow directions  Read the labels of all other medicines you are using to see if they also contain acetaminophen, or ask your doctor or pharmacist  Acetaminophen can cause liver damage if not taken correctly  Do not use more than 4 grams (4,000 milligrams) total of acetaminophen in one day  Manage your cold:   · Rest as much as possible  Slowly start to do more each day  · Drink more liquids as directed  Liquids will help thin and loosen mucus so you can cough it up  Liquids will also help prevent dehydration  Liquids that help prevent dehydration include water, fruit juice, and broth  Do not drink liquids that contain caffeine  Caffeine can increase your risk for dehydration  Ask your healthcare provider how much liquid to drink each day  · Soothe a sore throat  Gargle with warm salt water  This helps your sore throat feel better  Make salt water by dissolving ¼ teaspoon salt in 1 cup warm water  You may also suck on hard candy or throat lozenges  You may use a sore throat spray  · Use a humidifier or vaporizer  Use a cool mist humidifier or a vaporizer to increase air moisture in your home  This may make it easier for you to breathe and help decrease your cough  · Use saline nasal drops as directed  These help relieve congestion  · Apply petroleum-based jelly around the outside of your nostrils  This can decrease irritation from blowing your nose  · Do not smoke    Nicotine and other chemicals in cigarettes and cigars can make your symptoms worse  They can also cause infections such as bronchitis or pneumonia  Ask your healthcare provider for information if you currently smoke and need help to quit  E-cigarettes or smokeless tobacco still contain nicotine  Talk to your healthcare provider before you use these products  Prevent spreading your cold to others:   · Try to stay away from other people during the first 2 to 3 days of your cold when it is more easily spread  · Do not share food or drinks  · Do not share hand towels with household members  · Wash your hands often, especially after you blow your nose  Turn away from other people and cover your mouth and nose with a tissue when you sneeze or cough  Follow up with your healthcare provider as directed:  Write down your questions so you remember to ask them during your visits  © 2017 [address] Information is for End User's use only and may not be sold, redistributed or otherwise used for commercial purposes  All illustrations and images included in CareNotes® are the copyrighted property of [employer]  or [employer]  The above information is an  only  It is not intended as medical advice for individual conditions or treatments  Talk to your doctor, nurse or pharmacist before following any medical regimen to see if it is safe and effective for you

## 2018-09-12 NOTE — PROGRESS NOTES
[facility] [employer] [facility]        NAME: Ozzie Orellana is a 65 y o  female  : 1940    MRN: [id number]  DATE: 2018  TIME: 11:08 AM    Assessment and Plan   Upper respiratory tract infection, unspecified type [J06 9]  1  Upper respiratory tract infection, unspecified type  cetirizine (ZyrTEC) 10 mg tablet    fluticasone (FLONASE) 50 mcg/act nasal spray    azithromycin (ZITHROMAX) 250 mg tablet    benzonatate (TESSALON PERLES) 100 mg capsule         Patient Instructions     Take medications as directed  Motrin and/or Tylenol as needed for fevers or pain  Continue using normal medicines  Follow up with PCP in 3-5 days  Proceed to  ER if symptoms worsen  Chief Complaint     Chief Complaint   Patient presents with    Cough     Pt c/o cough, congestion, and night sweats for a few days  History of Present Illness       27-year-old female presents with several day history of cough congestion and night sweats  Patient also reports some mild sore throats  Had some mild nausea and a couple bouts of diarrhea initially this past weekend when it 1st started  It has now resolved  Patient still has the chest congestion cough mild sore throat  Patient also reports feeling fatigued      URI    This is a new problem  The current episode started in the past 7 days  The problem has been waxing and waning  Maximum temperature: Subjective fevers  The fever has been present for 1 to 2 days  Associated symptoms include congestion, coughing, diarrhea, nausea, rhinorrhea and a sore throat  Pertinent negatives include no abdominal pain, chest pain, dysuria, ear pain, headaches, neck pain, rash, sneezing, vomiting or wheezing  She has tried nothing for the symptoms  The treatment provided no relief  Review of Systems   Review of Systems   Constitutional: Negative  HENT: Positive for congestion, rhinorrhea and sore throat  Negative for ear pain and sneezing  Eyes: Negative      Respiratory: Positive for cough  Negative for wheezing  Cardiovascular: Negative for chest pain  Gastrointestinal: Positive for diarrhea and nausea  Negative for abdominal pain and vomiting  Genitourinary: Negative  Negative for dysuria  Musculoskeletal: Negative  Negative for neck pain  Skin: Negative for rash  Neurological: Negative for headaches           Current Medications       Current Outpatient Prescriptions:     bimatoprost (LUMIGAN) 0 01 % ophthalmic drops, Administer 1 drop to both eyes daily at bedtime  , Disp: , Rfl:     levothyroxine 50 mcg tablet, Take 1 tablet (50 mcg total) by mouth daily, Disp: 90 tablet, Rfl: 1    pravastatin (PRAVACHOL) 20 mg tablet, Take 1 tablet by mouth daily, Disp: , Rfl:     albuterol (PROAIR HFA) 90 mcg/act inhaler, Inhale 2 puffs every 4 (four) hours as needed  , Disp: , Rfl:     Ascorbic Acid (VITAMIN C) 100 MG tablet, Take 100 mg by mouth daily  , Disp: , Rfl:     azithromycin (ZITHROMAX) 250 mg tablet, Take 2 tablets today then 1 tablet daily x 4 days, Disp: 6 tablet, Rfl: 0    benzonatate (TESSALON PERLES) 100 mg capsule, Take 1 capsule (100 mg total) by mouth 3 (three) times a day as needed for cough, Disp: 15 capsule, Rfl: 0    cetirizine (ZyrTEC) 10 mg tablet, Take 0 5 tablets (5 mg total) by mouth daily, Disp: 30 tablet, Rfl: 0    Cholecalciferol (VITAMIN D) 2000 units CAPS, Take 1 tablet by mouth daily, Disp: , Rfl:     fluticasone (FLONASE) 50 mcg/act nasal spray, 2 sprays into each nostril daily, Disp: 16 g, Rfl: 0    Multiple Vitamins-Minerals (MULTIVITAMIN ADULT PO), Take by mouth daily, Disp: , Rfl:     Current Allergies     Allergies as of 09/12/2018 - Reviewed 09/12/2018   Allergen Reaction Noted    Penicillins Hives, Rash, and Edema 04/08/2014            The following portions of the patient's history were reviewed and updated as appropriate: allergies, current medications, past family history, past medical history, past social history, past surgical history and problem list      Past Medical History:   Diagnosis Date    Arthritis     Disease of thyroid gland     Hyperlipidemia     Migraine     Pure hypercholesterolemia     Thyroid disease        Past Surgical History:   Procedure Laterality Date    DILATION AND CURETTAGE, DIAGNOSTIC / THERAPEUTIC      TONSILLECTOMY      TUBAL LIGATION         Family History   Problem Relation Age of Onset    Pancreatic cancer Mother         susceptibility     Kidney disease Father          Medications have been verified  Objective   /65   Pulse 79   Temp 99 3 °F (37 4 °C) (Tympanic)   Resp 16   Ht 5' 2" (1 575 m)   Wt 79 4 kg (175 lb)   SpO2 95%   BMI 32 01 kg/m²        Physical Exam     Physical Exam   Constitutional: She is oriented to person, place, and time  She appears well-developed and well-nourished  No distress  HENT:   Head: Normocephalic and atraumatic  Right Ear: External ear normal    Left Ear: External ear normal    Nose: Nose normal    Mouth/Throat: Oropharynx is clear and moist  No oropharyngeal exudate  Eyes: Conjunctivae are normal  Right eye exhibits no discharge  Left eye exhibits no discharge  Neck: Normal range of motion  Neck supple  Cardiovascular: Normal rate, regular rhythm, normal heart sounds and intact distal pulses  No murmur heard  Pulmonary/Chest: Effort normal and breath sounds normal  No respiratory distress  She has no wheezes  She has no rales  Abdominal: Soft  Bowel sounds are normal  There is no tenderness  Musculoskeletal: Normal range of motion  Lymphadenopathy:     She has no cervical adenopathy  Neurological: She is alert and oriented to person, place, and time  Skin: Skin is warm and dry  Psychiatric: She has a normal mood and affect  Nursing note and vitals reviewed

## 2018-09-20 ENCOUNTER — OFFICE VISIT (OUTPATIENT)
Dept: FAMILY MEDICINE CLINIC | Facility: CLINIC | Age: 78
End: 2018-09-20
Payer: MEDICARE

## 2018-09-20 VITALS
WEIGHT: 173 LBS | HEIGHT: 60 IN | TEMPERATURE: 97.9 F | BODY MASS INDEX: 33.96 KG/M2 | SYSTOLIC BLOOD PRESSURE: 124 MMHG | DIASTOLIC BLOOD PRESSURE: 70 MMHG

## 2018-09-20 DIAGNOSIS — R06.02 SHORTNESS OF BREATH: ICD-10-CM

## 2018-09-20 DIAGNOSIS — J45.20 MILD INTERMITTENT ASTHMA WITHOUT COMPLICATION: ICD-10-CM

## 2018-09-20 DIAGNOSIS — J06.9 ACUTE URI: Primary | ICD-10-CM

## 2018-09-20 PROCEDURE — 99213 OFFICE O/P EST LOW 20 MIN: CPT | Performed by: NURSE PRACTITIONER

## 2018-09-20 RX ORDER — ALBUTEROL SULFATE 90 UG/1
2 AEROSOL, METERED RESPIRATORY (INHALATION) EVERY 6 HOURS PRN
Qty: 1 INHALER | Refills: 1 | Status: SHIPPED | OUTPATIENT
Start: 2018-09-20 | End: 2021-02-04 | Stop reason: SDUPTHER

## 2018-09-20 RX ORDER — DESONIDE 0.5 MG/G
CREAM TOPICAL
COMMUNITY
End: 2018-09-24

## 2018-09-20 RX ORDER — PREDNISONE 10 MG/1
TABLET ORAL
Qty: 21 TABLET | Refills: 0 | Status: SHIPPED | OUTPATIENT
Start: 2018-09-20 | End: 2019-01-18 | Stop reason: ALTCHOICE

## 2018-09-20 RX ORDER — FLUTICASONE PROPIONATE 44 UG/1
AEROSOL, METERED RESPIRATORY (INHALATION)
COMMUNITY
End: 2019-08-21 | Stop reason: ALTCHOICE

## 2018-09-20 NOTE — PROGRESS NOTES
Assessment/Plan:    Acute URI  Will start prednisone taper and proair PRN  Will get chest x ray due to lingering symptoms  Continue with increased fluids  Call us if you experience any worsening symptoms or no improvement  Diagnoses and all orders for this visit:    Acute URI  -     predniSONE 10 mg tablet; Day 1: 6 tabs  Day 2: 5 tabs Day 3: 4 tabs  Day 4: 3 tabs  Day 5: 2 tabs  Day 6: 1 tab  -     XR chest pa & lateral; Future    Shortness of breath  -     predniSONE 10 mg tablet; Day 1: 6 tabs  Day 2: 5 tabs Day 3: 4 tabs  Day 4: 3 tabs  Day 5: 2 tabs  Day 6: 1 tab  -     XR chest pa & lateral; Future    Mild intermittent asthma without complication  -     albuterol (PROAIR HFA) 90 mcg/act inhaler; Inhale 2 puffs every 6 (six) hours as needed for wheezing or shortness of breath      Patient verbalizes understand and agrees with treatment plan  Subjective:        Patient ID: Tiarra Messina is a 77 y o  female  Chief Complaint   Patient presents with    Cough     with chest congestion, mild ST, with night sweats  pt was seen at [address] [place] [address] urgent care on 09/12/2018   Edema     B/L ankles; both feet feel "numb"       Patient presents to office today for chest cold  She had it for a few days then went to urgent care 9/12  Was seen in urgent care 9/12 and diagnosed with URI and given zyrtec, flonase, and azithromycin and tessalon perles  States she does feel a little better but still coughing and doesn't feel well and it's lingering a lot  Only got mild relief  Does have lower extremity edema but states she always does but recently worse cause she's not elevating them like she usually does  The following portions of the patient's history were reviewed and updated as appropriate: allergies, current medications, past family history, past social history and problem list     Review of Systems   Constitutional: Positive for chills  Negative for fever     HENT: Positive for postnasal drip and sore throat  Negative for congestion, ear discharge, ear pain, rhinorrhea, sinus pain and sinus pressure  Eyes: Negative for visual disturbance  Respiratory: Positive for cough, chest tightness, shortness of breath and wheezing  Negative for choking  Cardiovascular: Negative for chest pain  Gastrointestinal: Negative for abdominal pain, diarrhea, nausea and vomiting  Genitourinary: Negative for difficulty urinating and dysuria  Musculoskeletal: Negative for myalgias  Skin: Negative for rash  Neurological: Negative for dizziness and headaches  Psychiatric/Behavioral: Negative for agitation  Objective:  /70 (BP Location: Left arm, Patient Position: Sitting, Cuff Size: Large)   Temp 97 9 °F (36 6 °C) (Tympanic)   Ht 5' (1 524 m)   Wt 78 5 kg (173 lb)   BMI 33 79 kg/m²      Physical Exam   Constitutional: She is oriented to person, place, and time  She appears well-developed  No distress  HENT:   Head: Normocephalic and atraumatic  Right Ear: Hearing, tympanic membrane, external ear and ear canal normal  No drainage, swelling or tenderness  No decreased hearing is noted  Left Ear: Hearing, tympanic membrane, external ear and ear canal normal  No drainage, swelling or tenderness  No decreased hearing is noted  Nose: Rhinorrhea present  Right sinus exhibits no maxillary sinus tenderness and no frontal sinus tenderness  Left sinus exhibits no maxillary sinus tenderness and no frontal sinus tenderness  Mouth/Throat: Posterior oropharyngeal erythema present  No oropharyngeal exudate or posterior oropharyngeal edema  Eyes: Conjunctivae and lids are normal  Right eye exhibits no discharge  Left eye exhibits no discharge  Neck: Neck supple  No tracheal deviation present  Cardiovascular: Normal rate and regular rhythm  No murmur heard  Pulmonary/Chest: Effort normal and breath sounds normal  No respiratory distress  She has no wheezes  Abdominal: Soft   Bowel sounds are normal  She exhibits no distension  There is no tenderness  There is no guarding  Musculoskeletal: She exhibits no edema or deformity  Lymphadenopathy:     She has no cervical adenopathy  Neurological: She is alert and oriented to person, place, and time  Coordination normal    Skin: Skin is warm and dry  No rash noted  She is not diaphoretic  No erythema  Psychiatric: She has a normal mood and affect  Her speech is normal and behavior is normal  Judgment and thought content normal  Cognition and memory are normal    Nursing note and vitals reviewed

## 2018-09-20 NOTE — PATIENT INSTRUCTIONS
Start prednisone, this is the steroid  It will be 6 pills today and decrease by 1 pill each day for the following 6 days  So it will be 6-5-4-3-2-1  Take this with food as it may upset your stomach  It's best to take it earlier in the day otherwise it may keep you up at night  Complete chest x ray, and we will call with the results  Call us if you experience any worsening symptoms or no improvement  Acute Bronchitis   AMBULATORY CARE:   Acute bronchitis  is swelling and irritation in the air passages of your lungs  This irritation may cause you to cough or have other breathing problems  Acute bronchitis often starts because of another illness, such as a cold or the flu  The illness spreads from your nose and throat to your windpipe and airways  Bronchitis is often called a chest cold  Acute bronchitis lasts about 3 to 6 weeks and is usually not a serious illness  Your cough can last for several weeks  You may have any of the following symptoms:   · A cough with sputum that may be clear, yellow, or green    · Feeling more tired than usual, and body aches    · A fever and chills    · Wheezing when you breathe    · A tight chest or pain when you breathe or cough  Seek care immediately if:   · You cough up blood  · Your lips or fingernails turn blue  · You feel like you are not getting enough air when you breathe  Contact your healthcare provider if:   · You have a fever  · Your breathing problems do not go away or get worse  · Your cough does not get better within 4 weeks  · You have questions or concerns about your condition or care  Self-care:   · Get more rest   Rest helps your body to heal  Slowly start to do more each day  Rest when you feel it is needed  · Avoid irritants in the air  Avoid chemicals, fumes, and dust  Wear a face mask if you must work around dust or fumes  Stay inside on days when air pollution levels are high   If you have allergies, stay inside when pollen counts are high  Do not use aerosol products, such as spray-on deodorant, bug spray, and hair spray  · Do not smoke or be around others who smoke  Nicotine and other chemicals in cigarettes and cigars damages the cilia that move mucus out of your lungs  Ask your healthcare provider for information if you currently smoke and need help to quit  E-cigarettes or smokeless tobacco still contain nicotine  Talk to your healthcare provider before you use these products  · Drink liquids as directed  Liquids help keep your air passages moist and help you cough up mucus  You may need to drink more liquids when you have acute bronchitis  Ask how much liquid to drink each day and which liquids are best for you  · Use a humidifier or vaporizer  Use a cool mist humidifier or a vaporizer to increase air moisture in your home  This may make it easier for you to breathe and help decrease your cough  Prevent acute bronchitis by doing the following:   · Get the vaccinations you need  Ask your healthcare provider if you should get vaccinated against the flu or pneumonia  · Prevent the spread of germs  You can decrease your risk of acute bronchitis and other illnesses by doing the following:     [facility] your hands often with soap and water  Carry germ-killing hand lotion or gel with you  You can use the lotion or gel to clean your hands when soap and water are not available  ¨ Do not touch your eyes, nose, or mouth unless you have washed your hands first     ¨ Always cover your mouth when you cough to prevent the spread of germs  It is best to cough into a tissue or your shirt sleeve instead of into your hand  Ask those around you cover their mouths when they cough  ¨ Try to avoid people who have a cold or the flu  If you are sick, stay away from others as much as possible  Medicines: Your healthcare provider may  give you any of the following:  · Ibuprofen or acetaminophen  are medicines that help lower your fever  They are available without a doctor's order  Ask your healthcare provider which medicine is right for you  Ask how much to take and how often to take it  Follow directions  These medicines can cause stomach bleeding if not taken correctly  Ibuprofen can cause kidney damage  Do not take ibuprofen if you have kidney disease, an ulcer, or allergies to aspirin  Acetaminophen can cause liver damage  Do not take more than 4,000 milligrams in 24 hours  · Decongestants  help loosen mucus in your lungs and make it easier to cough up  This can help you breathe easier  · Cough suppressants  decrease your urge to cough  If your cough produces mucus, do not take a cough suppressant unless your healthcare provider tells you to  Your healthcare provider may suggest that you take a cough suppressant at night so you can rest     · Inhalers  may be given  Your healthcare provider may give you one or more inhalers to help you breathe easier and cough less  An inhaler gives your medicine to open your airways  Ask your healthcare provider to show you how to use your inhaler correctly  Follow up with your healthcare provider as directed:  Write down questions you have so you will remember to ask them during your follow-up visits  © 2017 [employer]  Information is for End User's use only and may not be sold, redistributed or otherwise used for commercial purposes  All illustrations and images included in CareNotes® are the copyrighted property of [employer]  or [employer]  The above information is an  only  It is not intended as medical advice for individual conditions or treatments  Talk to your doctor, nurse or pharmacist before following any medical regimen to see if it is safe and effective for you

## 2018-09-21 ENCOUNTER — APPOINTMENT (OUTPATIENT)
Dept: RADIOLOGY | Age: 78
End: 2018-09-21
Payer: MEDICARE

## 2018-09-21 DIAGNOSIS — R06.02 SHORTNESS OF BREATH: ICD-10-CM

## 2018-09-21 DIAGNOSIS — J06.9 ACUTE URI: ICD-10-CM

## 2018-09-21 PROBLEM — M54.17 LUMBOSACRAL RADICULITIS: Status: ACTIVE | Noted: 2018-09-21

## 2018-09-21 PROBLEM — M43.10 SPONDYLOLISTHESIS, ACQUIRED: Status: ACTIVE | Noted: 2018-09-21

## 2018-09-21 PROBLEM — M48.062 PSEUDOCLAUDICATION SYNDROME: Status: ACTIVE | Noted: 2018-09-21

## 2018-09-21 PROCEDURE — 71046 X-RAY EXAM CHEST 2 VIEWS: CPT

## 2018-09-24 ENCOUNTER — HOSPITAL ENCOUNTER (EMERGENCY)
Facility: HOSPITAL | Age: 78
Discharge: HOME | End: 2018-09-24
Attending: EMERGENCY MEDICINE | Admitting: EMERGENCY MEDICINE
Payer: MEDICARE

## 2018-09-24 ENCOUNTER — APPOINTMENT (EMERGENCY)
Dept: RADIOLOGY | Facility: HOSPITAL | Age: 78
End: 2018-09-24
Payer: MEDICARE

## 2018-09-24 VITALS
DIASTOLIC BLOOD PRESSURE: 72 MMHG | RESPIRATION RATE: 18 BRPM | WEIGHT: 171.96 LBS | OXYGEN SATURATION: 93 % | TEMPERATURE: 98.6 F | BODY MASS INDEX: 33.58 KG/M2 | SYSTOLIC BLOOD PRESSURE: 163 MMHG | HEART RATE: 76 BPM

## 2018-09-24 DIAGNOSIS — M79.601 RIGHT ARM PAIN: Primary | ICD-10-CM

## 2018-09-24 PROCEDURE — 73060 X-RAY EXAM OF HUMERUS: CPT

## 2018-09-24 PROCEDURE — 73030 X-RAY EXAM OF SHOULDER: CPT

## 2018-09-24 PROCEDURE — 73080 X-RAY EXAM OF ELBOW: CPT

## 2018-09-24 PROCEDURE — 73090 X-RAY EXAM OF FOREARM: CPT

## 2018-09-24 PROCEDURE — 99283 EMERGENCY DEPT VISIT LOW MDM: CPT

## 2018-09-24 RX ORDER — NAPROXEN 500 MG/1
500 TABLET ORAL 2 TIMES DAILY WITH MEALS
Qty: 30 TABLET | Refills: 0 | Status: SHIPPED | OUTPATIENT
Start: 2018-09-24 | End: 2019-01-18 | Stop reason: ALTCHOICE

## 2018-09-24 NOTE — DISCHARGE INSTRUCTIONS
Arm Pain   WHAT YOU NEED TO KNOW:   Your arm pain may be caused by a number of conditions  Examples include arthritis, nerve problems, or an awkward position while you sleep  X-rays did not show a broken bone in your arm or wrist  Arm pain may be a sign of a serious condition that needs immediate care, such as a heart attack  DISCHARGE INSTRUCTIONS:   Call 911 for any of the following: You have any of the following signs of a heart attack:   · Squeezing, pressure, or pain in your chest that lasts longer than 5 minutes or returns    · Discomfort or pain in your back, neck, jaw, stomach, or arm     · Trouble breathing or a fast, fluttery heartbeat    · Nausea or vomiting    · Lightheadedness or a sudden cold sweat, especially with chest pain or trouble breathing  Return to the emergency department if:   · You have severe pain, or pain that spreads from your arm to other areas  · You have swelling, tingling, or numbness in your hand or fingers, or the skin turns blue  · You cannot move your arm  Contact your healthcare provider if:   · You have questions or concerns about your condition or care  Medicines: You may need any of the following:  · Prescription pain medicine  may be given  Ask how to take this medicine safely  · NSAIDs , such as ibuprofen, help decrease swelling, pain, and fever  This medicine is available with or without a doctor's order  NSAIDs can cause stomach bleeding or kidney problems in certain people  If you take blood thinner medicine, always ask your healthcare provider if NSAIDs are safe for you  Always read the medicine label and follow directions  · Take your medicine as directed  Contact your healthcare provider if you think your medicine is not helping or if you have side effects  Tell him or her if you are allergic to any medicine  Keep a list of the medicines, vitamins, and herbs you take  Include the amounts, and when and why you take them   Bring the list or the pill bottles to follow-up visits  Carry your medicine list with you in case of an emergency  Self-care:   · Rest your arm as directed  A sling may be used to keep your arm from moving while it heals  · Apply ice as directed  Ice helps decrease pain and swelling  Ice may also help prevent tissue damage  Use an ice pack, or put crushed ice in a plastic bag  Cover it with a towel  Apply it to your arm for 20 minutes every few hours, or as directed  Ask how many times to apply ice each day, and for how many days  · Elevate your arm above the level of your heart as often as you can  This will help decrease swelling and pain  Prop your arm on pillows or blankets to keep the area elevated comfortably  · Adjust your position if you work in front of a computer  You may need arm or wrist supports or change the height of your chair  · Keep a pain record  Write down when your pain happens and how severe it is  Include any other symptoms you have with your pain  A record will help you keep track of pain cycles  Bring the record with you to your follow-up visits  It may also help your healthcare provider find out what is causing your pain  Follow up with your healthcare provider as directed: You may need physical therapy  You may need to see an orthopedic specialist  Write down your questions so you remember to ask them during your visits  © 2017 [employer]  Information is for End User's use only and may not be sold, redistributed or otherwise used for commercial purposes  All illustrations and images included in CareNotes® are the copyrighted property of [employer]  or [employer]  The above information is an  only  It is not intended as medical advice for individual conditions or treatments  Talk to your doctor, nurse or pharmacist before following any medical regimen to see if it is safe and effective for you        DISCHARGE INSTRUCTIONS:    FOLLOW UP WITH YOUR PRIMARY CARE PROVIDER OR THE RECOMMENDED HEALTH CLINIC  MAKE AN APPOINTMENT TO BE SEEN  TAKE NAPROXEN AS PRESCRIBED FOR PAIN AND INFLAMMATION  IF RASH, SHORTNESS OF BREATH OR TROUBLE SWALLOWING, STOP TAKING THE MEDICATION AND BE SEEN  FOLLOW UP WITH THE RECOMMENDED ORTHOPEDIC SPECIALIST  MAKE AN APPOINTMENT TO BE SEEN  REST, ICE AND ELEVATE THE AREA  WEAR THE SLING  IF SYMPTOMS WORSEN OR NEW SYMPTOMS ARISE, RETURN TO THE ER TO BE SEEN

## 2018-09-24 NOTE — ED PROVIDER NOTES
History  Chief Complaint   Patient presents with    Arm Injury     Was lifting boxes and felt left upper arm snap, small antecubital lump present     78y  o female with PMH of arthritis, thyroid disease, HLD and migraine presents to the ER for right arm pain since 08:30 today  Patient states she was lifting a box when she heard a snap  She denies taking any medication for pain  She describes her pain as sharp and radiating from her elbow up and down her arm  She rates her pain 9/10 and states it is constant  She is right hand dominant  She denies fever, chills, chest pain, dyspnea, N/V/D, abdomina pain, weakness or paresthesias  History provided by:  Patient   used: No        Prior to Admission Medications   Prescriptions Last Dose Informant Patient Reported? Taking?    Ascorbic Acid (VITAMIN C) 100 MG tablet   Yes No   Sig: Take 100 mg by mouth daily     Cholecalciferol (VITAMIN D) 2000 units CAPS   Yes Yes   Sig: Take 1 tablet by mouth daily   Multiple Vitamins-Minerals (MULTIVITAMIN ADULT PO)   Yes No   Sig: Take by mouth daily   albuterol (PROAIR HFA) 90 mcg/act inhaler   No Yes   Sig: Inhale 2 puffs every 6 (six) hours as needed for wheezing or shortness of breath   bimatoprost (LUMIGAN) 0 01 % ophthalmic drops   Yes Yes   Sig: Administer 1 drop to both eyes daily at bedtime     cetirizine (ZyrTEC) 10 mg tablet   No No   Sig: Take 0 5 tablets (5 mg total) by mouth daily   fluticasone (FLONASE) 50 mcg/act nasal spray   No No   Si sprays into each nostril daily   fluticasone (FLOVENT HFA) 44 mcg/act inhaler   Yes Yes   Sig: prn   levothyroxine 50 mcg tablet   No Yes   Sig: Take 1 tablet (50 mcg total) by mouth daily   pravastatin (PRAVACHOL) 20 mg tablet   Yes Yes   Sig: Take 1 tablet by mouth daily   predniSONE 10 mg tablet   No Yes   Sig: Day 1: 6 tabs  Day 2: 5 tabs Day 3: 4 tabs  Day 4: 3 tabs  Day 5: 2 tabs  Day 6: 1 tab      Facility-Administered Medications: None       Past Medical History:   Diagnosis Date    Arthritis     Disease of thyroid gland     Hyperlipidemia     Migraine     Pure hypercholesterolemia     Thyroid disease        Past Surgical History:   Procedure Laterality Date    DILATION AND CURETTAGE, DIAGNOSTIC / THERAPEUTIC      TONSILLECTOMY      TUBAL LIGATION         Family History   Problem Relation Age of Onset    Pancreatic cancer Mother         susceptibility     Kidney disease Father      I have reviewed and agree with the history as documented  Social History   Substance Use Topics    Smoking status: Former Smoker    Smokeless tobacco: Never Used      Comment: never smoker per allscript     Alcohol use 0 6 oz/week     1 Glasses of wine per week      Comment: social         Review of Systems   Constitutional: Negative for chills and fever  Eyes: Negative for redness  Respiratory: Negative for shortness of breath  Cardiovascular: Negative for chest pain  Gastrointestinal: Negative for abdominal pain, diarrhea, nausea and vomiting  Musculoskeletal: Negative for neck stiffness  Skin: Negative for rash  Allergic/Immunologic: Negative for food allergies  Neurological: Negative for weakness and numbness  Physical Exam  Physical Exam   Constitutional:  Non-toxic appearance  No distress  HENT:   Head: Normocephalic and atraumatic  Right Ear: Tympanic membrane, external ear and ear canal normal  No drainage, swelling or tenderness  No foreign bodies  Tympanic membrane is not erythematous  No hemotympanum  Left Ear: Tympanic membrane, external ear and ear canal normal  No drainage, swelling or tenderness  No foreign bodies  Tympanic membrane is not erythematous  No hemotympanum  Nose: Nose normal    Mouth/Throat: Uvula is midline, oropharynx is clear and moist and mucous membranes are normal  No uvula swelling  No posterior oropharyngeal edema, posterior oropharyngeal erythema or tonsillar abscesses  No tonsillar exudate  Neck: Normal range of motion and phonation normal  Neck supple  No tracheal deviation present  Cardiovascular: Normal rate, regular rhythm, S1 normal, S2 normal and normal heart sounds  Exam reveals no gallop and no friction rub  No murmur heard  Pulmonary/Chest: Effort normal and breath sounds normal  No respiratory distress  She has no decreased breath sounds  She has no wheezes  She has no rhonchi  She has no rales  She exhibits no tenderness  Musculoskeletal:        Right shoulder: Normal         Right elbow: She exhibits decreased range of motion and swelling  She exhibits no effusion, no deformity and no laceration  No tenderness found  Right wrist: Normal         Right upper arm: Normal         Right forearm: She exhibits tenderness and swelling  She exhibits no bony tenderness, no edema, no deformity and no laceration  Arms:       Right hand: Normal    Neurological: She is alert  GCS eye subscore is 4  GCS verbal subscore is 5  GCS motor subscore is 6  Skin: Skin is warm and dry  No rash noted  Psychiatric: She has a normal mood and affect  Nursing note and vitals reviewed  Vital Signs  ED Triage Vitals [09/24/18 0930]   Temperature Pulse Respirations Blood Pressure SpO2   98 6 °F (37 °C) 76 18 163/72 93 %      Temp Source Heart Rate Source Patient Position - Orthostatic VS BP Location FiO2 (%)   Temporal -- -- -- --      Pain Score       8           Vitals:    09/24/18 0930   BP: 163/72   Pulse: 76       Visual Acuity      ED Medications  Medications - No data to display    Diagnostic Studies  Results Reviewed     None                 XR forearm 2 views RIGHT   ED Interpretation by Shade Weber PA-C (09/24 1042)   No acute abnormalities seen by me at this time  XR elbow 3+ views RIGHT   ED Interpretation by Shade Weber PA-C (09/24 1042)   No acute abnormalities seen by me at this time        XR humerus RIGHT   ED Interpretation by Shade Weber PA-C (09/24 1042)   No acute abnormalities seen by me at this time  XR shoulder 2+ views RIGHT   ED Interpretation by Emily Garcia PA-C (09/24 1042)   No acute abnormalities seen by me at this time  Procedures  Orthopedic Injury  Date/Time: 9/24/2018 11:16 AM  Performed by: Jillian Najera by: Benito Hobson     Patient Location:  ED  Other Assisting Provider: Yes (comment) (ED RN)    Verbal consent obtained?: Yes    Consent given by:  Patient  Patient states understanding of procedure being performed: Yes    Radiology Images displayed and confirmed  If images not available, report reviewed: Yes    Patient identity confirmed:  Arm band  Injury location:  Elbow  Location details:  Right elbow  Injury type: Soft tissue  Neurovascular status: Neurovascularly intact    Distal perfusion: normal    Neurological function: normal    Range of motion: normal    Local anesthesia used?: No    General anesthesia used?: No    Skeletal traction used?: No    Immobilization:  Sling  Neurovascular status: Neurovascularly intact    Distal perfusion: normal    Neurological function: normal    Range of motion: normal    Patient tolerance:  Patient tolerated the procedure well with no immediate complications           Phone Contacts  ED Phone Contact    ED Course                               MDM  Number of Diagnoses or Management Options  Right arm pain: new and requires workup  Diagnosis management comments: DDX consists of but not limited to: tendon rupture, strain, fracture    Xrays were ordered by nursing staff prior to me seeing the patient  Informed patient I did not see any acute abnormalities on xray at this time and if the radiologist saw anything concerning when reading the xray, we would call to inform them  Patient agreeable      At discharge, I instructed the patient to:  -follow up with pcp  -take Naproxen as prescribed for pain and inflammation  -rest, ice and elevate the area  -follow up with the recommended orthopedic specialist  -wear the sling for comfort  -return to the ER if symptoms worsened or new symptoms arose  Patient agreed to this plan and was stable at time of discharge  Amount and/or Complexity of Data Reviewed  Tests in the radiology section of CPT®: ordered and reviewed  Independent visualization of images, tracings, or specimens: yes    Patient Progress  Patient progress: stable    CritCare Time    Disposition  Final diagnoses:   Right arm pain     Time reflects when diagnosis was documented in both MDM as applicable and the Disposition within this note     Time User Action Codes Description Comment    9/24/2018 10:55 AM Shara Chavez [M79 [id number]] Right arm pain       ED Disposition     ED Disposition Condition Comment    Discharge  Debra Barry discharge to home/self care  Condition at discharge: Stable        Follow-up Information     Follow up With Specialties Details Why Contact Info    Sandra Lamar DO Family Medicine Schedule an appointment as soon as possible for a visit As needed [address]    [address]  [facility]  [phone]      [address] Orthopedic Surgery Schedule an appointment as soon as possible for a visit As needed [place] [id number] [phone]          Discharge Medication List as of 9/24/2018 10:57 AM      START taking these medications    Details   naproxen (NAPROSYN) 500 mg tablet Take 1 tablet (500 mg total) by mouth 2 (two) times a day with meals, Starting Mon 9/24/2018, Print         CONTINUE these medications which have NOT CHANGED    Details   albuterol (PROAIR HFA) 90 mcg/act inhaler Inhale 2 puffs every 6 (six) hours as needed for wheezing or shortness of breath, Starting Thu 9/20/2018, Normal      bimatoprost (LUMIGAN) 0 01 % ophthalmic drops Administer 1 drop to both eyes daily at bedtime  , Historical Med      Cholecalciferol (VITAMIN D) 2000 units CAPS Take 1 tablet by mouth daily, Historical Med      fluticasone (FLOVENT HFA) 44 mcg/act inhaler prn, Historical Med      levothyroxine 50 mcg tablet Take 1 tablet (50 mcg total) by mouth daily, Starting Fri 7/27/2018, Normal      pravastatin (PRAVACHOL) 20 mg tablet Take 1 tablet by mouth daily, Starting Sun 12/11/2016, Historical Med      predniSONE 10 mg tablet Day 1: 6 tabs  Day 2: 5 tabs Day 3: 4 tabs  Day 4: 3 tabs  Day 5: 2 tabs  Day 6: 1 tab, Normal      Ascorbic Acid (VITAMIN C) 100 MG tablet Take 100 mg by mouth daily  , Historical Med      cetirizine (ZyrTEC) 10 mg tablet Take 0 5 tablets (5 mg total) by mouth daily, Starting Wed 9/12/2018, Normal      fluticasone (FLONASE) 50 mcg/act nasal spray 2 sprays into each nostril daily, Starting Wed 9/12/2018, Normal      Multiple Vitamins-Minerals (MULTIVITAMIN ADULT PO) Take by mouth daily, Historical Med           No discharge procedures on file      ED Provider  Electronically Signed by           Teetee Ribera PA-C  09/24/18 8446

## 2018-10-09 ENCOUNTER — OFFICE VISIT (OUTPATIENT)
Dept: FAMILY MEDICINE CLINIC | Facility: CLINIC | Age: 78
End: 2018-10-09
Payer: MEDICARE

## 2018-10-09 VITALS
SYSTOLIC BLOOD PRESSURE: 148 MMHG | BODY MASS INDEX: 34.28 KG/M2 | HEIGHT: 60 IN | DIASTOLIC BLOOD PRESSURE: 74 MMHG | WEIGHT: 174.6 LBS

## 2018-10-09 DIAGNOSIS — Z13.820 SCREENING FOR OSTEOPOROSIS: ICD-10-CM

## 2018-10-09 DIAGNOSIS — Z23 NEED FOR INFLUENZA VACCINATION: ICD-10-CM

## 2018-10-09 DIAGNOSIS — Z00.00 ENCOUNTER FOR MEDICARE ANNUAL WELLNESS EXAM: ICD-10-CM

## 2018-10-09 DIAGNOSIS — S46.111D RUPTURE OF RIGHT LONG HEAD BICEPS TENDON, SUBSEQUENT ENCOUNTER: ICD-10-CM

## 2018-10-09 DIAGNOSIS — M89.9 DISORDER OF BONE: ICD-10-CM

## 2018-10-09 DIAGNOSIS — R60.0 LOCALIZED EDEMA: Primary | ICD-10-CM

## 2018-10-09 DIAGNOSIS — N18.2 STAGE 2 CHRONIC KIDNEY DISEASE: ICD-10-CM

## 2018-10-09 DIAGNOSIS — Z12.31 ENCOUNTER FOR SCREENING MAMMOGRAM FOR MALIGNANT NEOPLASM OF BREAST: ICD-10-CM

## 2018-10-09 DIAGNOSIS — E03.9 ADULT HYPOTHYROIDISM: ICD-10-CM

## 2018-10-09 PROBLEM — S46.111A RUPTURE OF RIGHT LONG HEAD BICEPS TENDON: Status: ACTIVE | Noted: 2018-09-26

## 2018-10-09 PROBLEM — M75.20 BICEPS TENDINITIS: Status: ACTIVE | Noted: 2018-09-26

## 2018-10-09 PROCEDURE — G0008 ADMIN INFLUENZA VIRUS VAC: HCPCS | Performed by: FAMILY MEDICINE

## 2018-10-09 PROCEDURE — 99214 OFFICE O/P EST MOD 30 MIN: CPT | Performed by: FAMILY MEDICINE

## 2018-10-09 PROCEDURE — 90662 IIV NO PRSV INCREASED AG IM: CPT | Performed by: FAMILY MEDICINE

## 2018-10-09 PROCEDURE — G0439 PPPS, SUBSEQ VISIT: HCPCS | Performed by: FAMILY MEDICINE

## 2018-10-09 NOTE — PROGRESS NOTES
Assessment/Plan:     Diagnoses and all orders for this visit:    Encounter for Medicare annual wellness exam    Localized edema    Rupture of right long head biceps tendon, subsequent encounter    Adult hypothyroidism  -     TSH baseline; Future  -     T4, free; Future  -     T3, free; Future    Need for influenza vaccination  -     Mammo screening bilateral w cad; Future  -     influenza vaccine, 1588-3911, high-dose, PF 0 5 mL, for patients 65 yr+ (FLUZONE HIGH-DOSE)    Encounter for screening mammogram for malignant neoplasm of breast   -     Mammo screening bilateral w cad; Future    Screening for osteoporosis  -     DXA bone density spine hip and pelvis; Future    Disorder of bone   -     DXA bone density spine hip and pelvis; Future    Stage 2 chronic kidney disease  -     Comprehensive metabolic panel; Future  -     Microalbumin / creatinine urine ratio        1  Encounter for Medicare annual wellness exam     2  Localized edema     3  Rupture of right long head biceps tendon, subsequent encounter     4  Adult hypothyroidism  TSH baseline    T4, free    T3, free   5  Need for influenza vaccination  Mammo screening bilateral w cad    influenza vaccine, [id number], high-dose, PF 0 5 mL, for patients 65 yr+ (FLUZONE HIGH-DOSE)   6  Encounter for screening mammogram for malignant neoplasm of breast   Mammo screening bilateral w cad   7  Screening for osteoporosis  DXA bone density spine hip and pelvis   8  Disorder of bone   DXA bone density spine hip and pelvis   9  Stage 2 chronic kidney disease  Comprehensive metabolic panel    Microalbumin / creatinine urine ratio       Health Maintenance Due   Topic Date Due    INFLUENZA VACCINE  09/01/2018               Subjective:   Chief Complaint   Patient presents with    Medicare Wellness Visit     wants to discuss flu vaccine/ patient stated hurt her left arm lifting a box  She is just getting over a cold so she would like to make sure she is ok to get flu vaccine  HPI:  Tariq Mcguire is a 77 y o  female here for her Subsequent Wellness Visit  Reviewed Updated [facility] Prior Wellness Visits:   Last Medicare wellness visit information was reviewed, patient interviewed , no change since last AWV  Last Medicare wellness visit information was reviewed, patient interviewed and updates made to the record today        Patient Active Problem List   Diagnosis    Adult hypothyroidism    Glaucoma    Heel spur, left    Hyperlipemia    Vitamin D deficiency    Primary osteoarthritis of both knees    Peripheral neuropathy    Meningioma (HCC)    Lumbar radiculopathy    Spinal stenosis of lumbar region    Right upper quadrant abdominal pain    Fatty liver    Cavernous hemangioma of liver    Asthma    Autoimmune disease ([facility] [address] )    Stage 2 chronic kidney disease    Disorder of lipid metabolism    Disorder of uterus    Polyp of corpus uteri    Spondylolisthesis, acquired    Localized, primary osteoarthritis    Lumbosacral radiculitis    Pseudoclaudication syndrome    Rupture of right long head biceps tendon     Past Medical History:   Diagnosis Date    Arthritis     Disease of thyroid gland     Hyperlipidemia     Migraine     Pure hypercholesterolemia     Thyroid disease      Past Surgical History:   Procedure Laterality Date    DILATION AND CURETTAGE, DIAGNOSTIC / THERAPEUTIC      TONSILLECTOMY      TUBAL LIGATION       Family History   Problem Relation Age of Onset    Pancreatic cancer Mother         susceptibility     Kidney disease Father      History   Smoking Status    Former Smoker   Smokeless Tobacco    Never Used     Comment: never smoker per allscript      History   Alcohol Use    0 6 oz/week    1 Glasses of wine per week     Comment: social       History   Drug Use No       Current Outpatient Prescriptions   Medication Sig Dispense Refill    albuterol (PROAIR HFA) 90 mcg/act inhaler Inhale 2 puffs every 6 (six) hours as needed for wheezing or shortness of breath 1 Inhaler 1    bimatoprost (LUMIGAN) 0 01 % ophthalmic drops Administer 1 drop to both eyes daily at bedtime        levothyroxine 50 mcg tablet Take 1 tablet (50 mcg total) by mouth daily 90 tablet 1    pravastatin (PRAVACHOL) 20 mg tablet Take 1 tablet by mouth daily      Ascorbic Acid (VITAMIN C) 100 MG tablet Take 100 mg by mouth daily        cetirizine (ZyrTEC) 10 mg tablet Take 0 5 tablets (5 mg total) by mouth daily (Patient not taking: Reported on 10/9/2018 ) 30 tablet 0    Cholecalciferol (VITAMIN D) 2000 units CAPS Take 1 tablet by mouth daily      fluticasone (FLONASE) 50 mcg/act nasal spray 2 sprays into each nostril daily (Patient not taking: Reported on 10/9/2018 ) 16 g 0    fluticasone (FLOVENT HFA) 44 mcg/act inhaler prn      Multiple Vitamins-Minerals (MULTIVITAMIN ADULT PO) Take by mouth daily      naproxen (NAPROSYN) 500 mg tablet Take 1 tablet (500 mg total) by mouth 2 (two) times a day with meals (Patient not taking: Reported on 10/9/2018 ) 30 tablet 0    predniSONE 10 mg tablet Day 1: 6 tabs  Day 2: 5 tabs Day 3: 4 tabs  Day 4: 3 tabs  Day 5: 2 tabs  Day 6: 1 tab (Patient not taking: Reported on 10/9/2018 ) 21 tablet 0     No current facility-administered medications for this visit  Allergies   Allergen Reactions    Penicillins Hives, Rash and Edema     Category:  Allergy;     Mold Extract [Trichophyton]      Immunization History   Administered Date(s) Administered    Influenza 11/14/2012, 11/06/2013    Influenza Split High Dose Preservative Free IM 09/18/2015, 09/15/2016, 09/22/2017    Influenza, high dose seasonal 0 5 mL 10/09/2018    Pneumococcal Conjugate 13-Valent 01/19/2018    Pneumococcal Conjugate PCV 7 06/19/2015    Pneumococcal Polysaccharide PPV23 07/13/2008    Rabies, Intramuscular 05/27/2018, 05/30/2018, 06/03/2018, 06/10/2018    TD (adult) Preservative Free 01/13/2012    Zoster 01/13/2010       Patient Care Team:  Anisha Geller DO [place] as PCP - General  MD Juan David Rosario DO Jani Grad, DO        Medicare Screening Tests and Risk Assessments:  [unfilled]   [unfilled]  Elvira exam data present            Objective:  /74 (BP Location: Left arm, Cuff Size: Standard)   Ht 5' (1 524 m)   Wt 79 2 kg (174 lb 9 6 oz)   BMI 34 10 kg/m²            Assessment and Plan:    Problem List Items Addressed This Visit        Endocrine    Adult hypothyroidism    Relevant Orders    TSH baseline    T4, free    T3, free       Musculoskeletal and Integument    Rupture of right long head biceps tendon       Genitourinary    Stage 2 chronic kidney disease    Relevant Orders    Comprehensive metabolic panel    Microalbumin / creatinine urine ratio      Other Visit Diagnoses     Encounter for Medicare annual wellness exam    -  Primary    Localized edema        Need for influenza vaccination        Relevant Orders    Mammo screening bilateral w cad    influenza vaccine, [id number], high-dose, PF 0 5 mL, for patients 65 yr+ (FLUZONE HIGH-DOSE) (Completed)    Encounter for screening mammogram for malignant neoplasm of breast         Relevant Orders    Mammo screening bilateral w cad    Screening for osteoporosis        Relevant Orders    DXA bone density spine hip and pelvis    Disorder of bone         Relevant Orders    DXA bone density spine hip and pelvis        Health Maintenance Due   Topic Date Due    INFLUENZA VACCINE  09/01/2018         HPI:  Tariq Mcguire is a 77 y o  female here for her Subsequent Wellness Visit      Patient Active Problem List   Diagnosis    Adult hypothyroidism    Glaucoma    Heel spur, left    Hyperlipemia    Vitamin D deficiency    Primary osteoarthritis of both knees    Peripheral neuropathy    Meningioma (HCC)    Lumbar radiculopathy    Spinal stenosis of lumbar region    Right upper quadrant abdominal pain    Fatty liver    Cavernous hemangioma of liver    Asthma    Autoimmune disease ([facility] [address] )    Stage 2 chronic kidney disease    Disorder of lipid metabolism    Disorder of uterus    Polyp of corpus uteri    Spondylolisthesis, acquired    Localized, primary osteoarthritis    Lumbosacral radiculitis    Pseudoclaudication syndrome    Rupture of right long head biceps tendon     Past Medical History:   Diagnosis Date    Arthritis     Disease of thyroid gland     Hyperlipidemia     Migraine     Pure hypercholesterolemia     Thyroid disease      Past Surgical History:   Procedure Laterality Date    DILATION AND CURETTAGE, DIAGNOSTIC / THERAPEUTIC      TONSILLECTOMY      TUBAL LIGATION       Family History   Problem Relation Age of Onset    Pancreatic cancer Mother         susceptibility     Kidney disease Father      History   Smoking Status    Former Smoker   Smokeless Tobacco    Never Used     Comment: never smoker per allscript      History   Alcohol Use    0 6 oz/week    1 Glasses of wine per week     Comment: social       History   Drug Use No       Current Outpatient Prescriptions   Medication Sig Dispense Refill    albuterol (PROAIR HFA) 90 mcg/act inhaler Inhale 2 puffs every 6 (six) hours as needed for wheezing or shortness of breath 1 Inhaler 1    bimatoprost (LUMIGAN) 0 01 % ophthalmic drops Administer 1 drop to both eyes daily at bedtime        levothyroxine 50 mcg tablet Take 1 tablet (50 mcg total) by mouth daily 90 tablet 1    pravastatin (PRAVACHOL) 20 mg tablet Take 1 tablet by mouth daily      Ascorbic Acid (VITAMIN C) 100 MG tablet Take 100 mg by mouth daily        cetirizine (ZyrTEC) 10 mg tablet Take 0 5 tablets (5 mg total) by mouth daily (Patient not taking: Reported on 10/9/2018 ) 30 tablet 0    Cholecalciferol (VITAMIN D) 2000 units CAPS Take 1 tablet by mouth daily      fluticasone (FLONASE) 50 mcg/act nasal spray 2 sprays into each nostril daily (Patient not taking: Reported on 10/9/2018 ) 16 g 0    fluticasone (FLOVENT HFA) 44 mcg/act inhaler prn      Multiple Vitamins-Minerals (MULTIVITAMIN ADULT PO) Take by mouth daily      naproxen (NAPROSYN) 500 mg tablet Take 1 tablet (500 mg total) by mouth 2 (two) times a day with meals (Patient not taking: Reported on 10/9/2018 ) 30 tablet 0    predniSONE 10 mg tablet Day 1: 6 tabs  Day 2: 5 tabs Day 3: 4 tabs  Day 4: 3 tabs  Day 5: 2 tabs  Day 6: 1 tab (Patient not taking: Reported on 10/9/2018 ) 21 tablet 0     No current facility-administered medications for this visit  Allergies   Allergen Reactions    Penicillins Hives, Rash and Edema     Category: Allergy;     Mold Extract [Trichophyton]      Immunization History   Administered Date(s) Administered    Influenza 11/14/2012, 11/06/2013    Influenza Split High Dose Preservative Free IM 09/18/2015, 09/15/2016, 09/22/2017    Influenza, high dose seasonal 0 5 mL 10/09/2018    Pneumococcal Conjugate 13-Valent 01/19/2018    Pneumococcal Conjugate PCV 7 06/19/2015    Pneumococcal Polysaccharide PPV23 07/13/2008    Rabies, Intramuscular 05/27/2018, 05/30/2018, 06/03/2018, 06/10/2018    TD (adult) Preservative Free 01/13/2012    Zoster 01/13/2010       Patient Care Team:  Maco Garcia DO as PCP - General  MD Luana Kendall DO Chari Laud, DO    Medicare Screening Tests and Risk Assessments:      Preventative Screening/Counseling:      Cardiovascular:      General: Screening Current          Diabetes:      General: Screening Current          Colorectal Cancer:      General: Screening Current          Cervical Cancer:      General: Screening Not Indicated          Osteoporosis:      Due for studies: DXA Axial and DXA Appendicular          Glaucoma:      General: Screening Current          HIV:      General: Screening Not Indicated          Hepatitis C:      General: Screening Not Indicated        Advanced Directives:   Patient has living will for healthcare, has durable POA for healthcare, patient has an advanced directive  Immunizations:  Patient reviewed and up to date        There is an additional note on this chart today as patient had other issues that were separate identifiable issues

## 2018-10-09 NOTE — PROGRESS NOTES
Assessment and Plan:  Problem List Items Addressed This Visit     None        Health Maintenance Due   Topic Date Due    INFLUENZA VACCINE  09/01/2018         HPI:  Patient Active Problem List   Diagnosis    Adult hypothyroidism    Glaucoma    Heel spur, left    Hyperlipemia    Vitamin D deficiency    Primary osteoarthritis of both knees    Peripheral neuropathy    [facility]    Lumbar radiculopathy    Spinal stenosis of lumbar region    Right upper quadrant abdominal pain    Fatty liver    Cavernous hemangioma of liver    Asthma    [facility]immune disease ([facility]    Chronic renal insufficiency, stage III (moderate) (HCC)    Disorder of lipid metabolism    Disorder of uterus    Polyp of corpus uteri    Spondylolisthesis, acquired    Localized, primary osteoarthritis    Lumbosacral radiculitis    Pseudoclaudication syndrome     Past Medical History:   Diagnosis Date    Arthritis     Disease of thyroid gland     Hyperlipidemia     Migraine     Pure hypercholesterolemia     Thyroid disease      Past Surgical History:   Procedure Laterality Date    DILATION AND CURETTAGE, DIAGNOSTIC / THERAPEUTIC      TONSILLECTOMY      TUBAL LIGATION       Family History   Problem Relation Age of Onset    Pancreatic cancer Mother         susceptibility     Kidney disease Father      History   Smoking Status    Former Smoker   Smokeless Tobacco    Never Used     Comment: never smoker per allscript      History   Alcohol Use    0 6 oz/week    1 Glasses of wine per week     Comment: social       History   Drug Use No         Current Outpatient Prescriptions   Medication Sig Dispense Refill    albuterol (PROAIR HFA) 90 mcg/act inhaler Inhale 2 puffs every 6 (six) hours as needed for wheezing or shortness of breath 1 Inhaler 1    Ascorbic Acid (VITAMIN C) 100 MG tablet Take 100 mg by mouth daily        bimatoprost (LUMIGAN) 0 01 % ophthalmic drops Administer 1 drop to both eyes daily at bedtime  cetirizine (ZyrTEC) 10 mg tablet Take 0 5 tablets (5 mg total) by mouth daily 30 tablet 0    Cholecalciferol (VITAMIN D) 2000 units CAPS Take 1 tablet by mouth daily      fluticasone (FLONASE) 50 mcg/act nasal spray 2 sprays into each nostril daily 16 g 0    fluticasone (FLOVENT HFA) 44 mcg/act inhaler prn      levothyroxine 50 mcg tablet Take 1 tablet (50 mcg total) by mouth daily 90 tablet 1    Multiple Vitamins-Minerals (MULTIVITAMIN ADULT PO) Take by mouth daily      naproxen (NAPROSYN) 500 mg tablet Take 1 tablet (500 mg total) by mouth 2 (two) times a day with meals 30 tablet 0    pravastatin (PRAVACHOL) 20 mg tablet Take 1 tablet by mouth daily      predniSONE 10 mg tablet Day 1: 6 tabs  Day 2: 5 tabs Day 3: 4 tabs  Day 4: 3 tabs  Day 5: 2 tabs  Day 6: 1 tab 21 tablet 0     No current facility-administered medications for this visit  Allergies   Allergen Reactions    Penicillins Hives, Rash and Edema     Category: Allergy;     Mold Extract [Trichophyton]      Immunization History   Administered Date(s) Administered    Influenza 11/14/2012, 11/06/2013    Influenza Split High Dose Preservative Free IM 09/18/2015, 09/15/2016, 09/22/2017    Pneumococcal Conjugate 13-Valent 01/19/2018    Pneumococcal Conjugate PCV 7 06/19/2015    Pneumococcal Polysaccharide PPV23 07/13/2008    Rabies, Intramuscular 05/27/2018, 05/30/2018, 06/03/2018, 06/10/2018    TD (adult) Preservative Free 01/13/2012    Zoster 01/13/2010       Patient Care Team:  Yoni Clayton DO as PCP - General  MD Kalli Cruz DO Boris Martinet, DO    Medicare Screening Tests and Risk Assessments:      Health Risk Assessment:  Patient rates overall health as very good  Patient feels that their physical health rating is Same  Eyesight was rated as Same  Hearing was rated as Same  Patient feels that their emotional and mental health rating is Slightly better   Pain experienced by patient in the last 7 days has been Some  Patient's pain rating has been 5/10  Patient states that she has experienced no weight loss or gain in last 6 months  Emotional/Mental Health:  Patient has been feeling nervous/anxious  PHQ-9 Depression Screening:    Frequency of the following problems over the past two weeks:      1  Little interest or pleasure in doing things: 0 - not at all      2  Feeling down, depressed, or hopeless: 0 - not at all  PHQ-2 Score: 0          Broken Bones/Falls: Fall Risk Assessment:    In the past year, patient has experienced: No history of falling in past year          Bladder/Bowel:  Patient has leaked urine accidently in the last six months  Patient reports no loss of bowel control  Immunizations:  Patient has not had a flu vaccination within the last year  Patient has received a pneumonia shot  Patient has not received a shingles shot  Patient has not received tetanus/diphtheria shot  (Additional Comments: Patient hasn't had new shingles vaccine and she cannot recall her last tetanus vaccine either)    Home Safety:  Patient has trouble with stairs inside or outside of their home  Patient currently reports that there are no safety hazards present in home, working smoke alarms, working carbon monoxide detectors  Nutrition:  Current diet: Regular and Frequent junk food with servings of the following:    Medications:  Patient is not currently taking any over-the-counter supplements  Patient is able to manage medications  Lifestyle Choices:  Patient reports no tobacco use  Patient has smoked or used tobacco in the past   Patient has stopped her tobacco use  Tobacco use quit date: 1992  Patient reports alcohol use  Alcohol use per week: 3 monthly  Patient drives a vehicle  Patient wears seat belt          Activities of Daily Living:  Can get out of bed by his or her self, able to dress self, able to make own meals, able to do own shopping, able to bathe self, can do own laundry/housekeeping, can manage own money, pay bills and track expenses    Previous Hospitalizations:  No hospitalization or ED visit in past 12 months        Advanced Directives:  Patient has decided on a power of   Patient has spoken to designated power of   Patient has completed advanced directive  No exam data present    Physical Exam:  Review of Systems   Gastrointestinal: Negative for bowel incontinence  Psychiatric/Behavioral: The patient is not nervous/anxious  There were no vitals filed for this visit  There is no height or weight on file to calculate BMI      Physical Exam

## 2018-10-09 NOTE — PROGRESS NOTES
Assessment/Plan:   Diagnoses and all orders for this visit:    Localized edema    Adult hypothyroidism  -     TSH baseline; Future  -     T4, free; Future  -     T3, free; Future    Rupture of right long head biceps tendon, subsequent encounter    Stage 2 chronic kidney disease  -     Comprehensive metabolic panel; Future  -     Microalbumin / creatinine urine ratio    Encounter for Medicare annual wellness exam    Encounter for screening mammogram for malignant neoplasm of breast   -     Mammo screening bilateral w cad; Future    Screening for osteoporosis  -     DXA bone density spine hip and pelvis; Future    Disorder of bone   -     DXA bone density spine hip and pelvis; Future    Need for influenza vaccination  -     Mammo screening bilateral w cad; Future  -     influenza vaccine, [id number], high-dose, PF 0 5 mL, for patients 65 yr+ (FLUZONE HIGH-DOSE)      Patient verbalizes understand and agrees with treatment plan  Leg swelling is non-pitting edema and generalized  No pain with calf movement  We discussed compression socking therapy  We reviewed size, type, and usage of compression socking  We did discuss a possible vascular study but patient agreed trying the compression stockings first then completing further testing if necessary  Patient has had swelling in LEs prior, but this has been going on for a longer period of time  Patient is to follow up with [facility] regarding bicep injury, will continue to f/u with them and patient needs  Regarding patients kidney function concerns  Previous labs show GRF of 61 and creatinine of 0 9 from February 2018  These values were discussed with patient but due to patients hx and concerns will rpt CMP and urine ratio for proper trending  Thyroid testing due and ordered  Mammogram and Dexa ordered  Patient received high dose flu shot in office today  Patient will follow up with any concerns  +25 minutes spent with patient, with >50% counseling     I attest that the nurse practitioner student's  Documentation has been prepared under my direction and personally reviewed by me  I confirm that the note above accurately reflects the work and medical decision making performed by me  Hien LEIJA  Subjective:        Patient ID: Siddharth Byrd is a 77 y o  female  Chief Complaint   Patient presents with   [facility] Wellness Visit     wants to discuss flu vaccine/ patient stated hurt her left arm lifting a box  She is just getting over a cold so she would like to make sure she is ok to get flu vaccine  Patient presents today for annual wellness visit, however has additional complaints she would like to address  Patient has bilateral leg edema that she noted has gotten worse over the past two weeks  The edema appears worse on days where she is standing for prolong periods of time  Patient reports " I know I had kidney issues in the past, and want to be sure my kidneys are okay"  Additionally, patient had rupture of long bicep tear that occurred 9/24 while lifting boxes when moving  Patient was seen in the ED, xray were taken  Patient was referred to [facility] and seen by Dr Emanuel Hernández, visit note was reviewed  Patient had MRI and has a  visit with orthopedic surgeon 10/17/2018 to discuss the results of MRI  Patient has full range of motion in her wrist and her injury does not impede on her activities of daily living  Patient recently got over a URI, symptoms have now resolved and patient inquired about if it would be possible to have flu shot today  The following portions of the patient's history were reviewed and updated as appropriate: allergies, current medications, past family history, past social history and problem list     Review of Systems   Constitutional: Negative for activity change and fatigue  HENT: Positive for congestion and postnasal drip           Chronic patient being treated with Flonase and Zrytec   Eyes: Negative for pain and redness  Respiratory: Negative for wheezing  Gastrointestinal: Negative for constipation  Genitourinary: Negative for dyspareunia  Musculoskeletal: Negative for myalgias  Skin: Negative for color change  Neurological: Negative for dizziness and light-headedness  Psychiatric/Behavioral: Negative for confusion  Objective:  /74 (BP Location: Left arm, Cuff Size: Standard)   Ht 5' (1 524 m)   Wt 79 2 kg (174 lb 9 6 oz)   BMI 34 10 kg/m²      Physical Exam   Constitutional: She appears well-developed and well-nourished  HENT:   Head: Normocephalic  Eyes: Pupils are equal, round, and reactive to light  Neck: Normal range of motion  Cardiovascular: Normal rate, regular rhythm and normal heart sounds  Pulmonary/Chest: Effort normal and breath sounds normal  She has no wheezes  She has no rales  Lung sounds clear   Abdominal: Soft  Musculoskeletal: Normal range of motion  She exhibits edema  Generalize edema in lower extremities, non-pitting   Neurological: She is alert  Skin: Skin is warm  Psychiatric: She has a normal mood and affect   Her behavior is normal  Judgment and thought content normal

## 2018-10-09 NOTE — PROGRESS NOTES
Assessment/Plan:         Diagnoses and all orders for this visit:    Rupture of right long head biceps tendon, subsequent encounter    Adult hypothyroidism    Localized edema          Subjective:      Patient ID: Tavo Philippe is a 77 y o  female      HPI    {Common ambulatory SmartLinks:78552}      Review of Systems      Objective:  /74 (BP Location: Left arm, Cuff Size: Standard)   Ht 5' (1 524 m)   Wt 79 2 kg (174 lb 9 6 oz)   BMI 34 10 kg/m²        Physical Exam

## 2018-10-22 ENCOUNTER — EVALUATION (OUTPATIENT)
Dept: PHYSICAL THERAPY | Facility: CLINIC | Age: 78
End: 2018-10-22
Payer: MEDICARE

## 2018-10-22 ENCOUNTER — TRANSCRIBE ORDERS (OUTPATIENT)
Dept: PHYSICAL THERAPY | Facility: CLINIC | Age: 78
End: 2018-10-22

## 2018-10-22 DIAGNOSIS — S46.211D RUPTURE OF RIGHT DISTAL BICEPS TENDON, SUBSEQUENT ENCOUNTER: Primary | ICD-10-CM

## 2018-10-22 PROCEDURE — 97161 PT EVAL LOW COMPLEX 20 MIN: CPT | Performed by: PHYSICAL THERAPIST

## 2018-10-22 PROCEDURE — G8985 CARRY GOAL STATUS: HCPCS | Performed by: PHYSICAL THERAPIST

## 2018-10-22 PROCEDURE — 97110 THERAPEUTIC EXERCISES: CPT | Performed by: PHYSICAL THERAPIST

## 2018-10-22 PROCEDURE — G8984 CARRY CURRENT STATUS: HCPCS | Performed by: PHYSICAL THERAPIST

## 2018-10-22 NOTE — LETTER
2018    Angie Guan MD  [facility]    Patient: Everton Montejo   YOB: 1940   Date of Visit: 10/22/2018     Encounter Diagnosis     ICD-10-CM    1  Rupture of right distal biceps tendon, subsequent encounter S46 211D        Dear Dr Nguyễn Higgins:    Please review the attached Plan of Care from Abdiaziz Fina recent visit  Please verify that you agree therapy should continue by signing the attached document and sending it back to our office  If you have any questions or concerns, please don't hesitate to call  Sincerely,    Faustina Root PT      Referring Provider:      I certify that I have read the below Plan of Care and certify the need for these services furnished under this plan of treatment while under my care  Angie Guan MD  [address] [place], [place] [address]vard: [phone]          PT Evaluation     Today's date: 10/22/2018  Patient name: Everton Montejo  : 1940  MRN: [id number]  Referring provider: Liu Levi DO  Dx:   Encounter Diagnosis     ICD-10-CM    1  Rupture of right distal biceps tendon, subsequent encounter S46 211D                   Assessment  Impairments: abnormal coordination, abnormal or restricted ROM, activity intolerance, impaired physical strength and pain with function    Assessment details: Pt is a pleasant 77 y o  female presenting to outpatient physical therapy with Rupture of right distal biceps tendon, subsequent encounter  (primary encounter diagnosis)   Pt presents with pain, decreased range of motion, decreased strength, and decreased tolerance to activity  Pt is a good candidate for outpatient physical therapy and would benefit from skilled physical therapy to address limitations and to achieve goals  Thank you for this referral    Understanding of Dx/Px/POC: good   Prognosis: good    Goals  ST  Patient will report 25% decrease in pain in 4 weeks    2  Patient will demonstrate 25% improvement in ROM in 4 weeks  3  Patient will demonstrate 1/2 grade improvement in strength in 4 weeks  LT  Patient will be able to perform IADLS without restriction or pain by discharge  2  Patient will be independent in HEP by discharge  3  Patient will be able to return to recreational/work duties without restriction or pain by discharge  Plan  Patient would benefit from: PT eval and skilled PT  Planned modality interventions: cryotherapy and thermotherapy: hydrocollator packs  Planned therapy interventions: IADL retraining, body mechanics training, flexibility, functional ROM exercises, home exercise program, neuromuscular re-education, manual therapy, postural training, strengthening, stretching, therapeutic activities, therapeutic exercise, joint mobilization and ADL training  Frequency: 2x week  Duration in visits: 12  Duration in weeks: 6  Treatment plan discussed with: patient        Subjective Evaluation    History of Present Illness  Mechanism of injury: Pt reports she was attempting to lift an item out of a box while she was moving, when she notes she heard a pop and had significant pain  Reports radiographs were negative  Notes she was then referred to an orthopedic physician  States MRI revealed bicep rupture, however, opted to trial PT before discussing surgery  Notes she has had great reduction in pain levels since initial incident  States she has been avoiding heavy lifting and vacuuming due to fear of doing further damage  Reports she has been experiencing numbness in area of anterior right arm/elbow  Notes orthopedic physician instructed her that she has a benign blood clot in right elbow  Pain  Current pain ratin  At best pain ratin  At worst pain ratin    Social Support  Lives with: alone          Objective     Observations     Right Elbow   Negative for edema  Palpation     Right   No palpable tenderness to the brachioradialis  Tenderness of the biceps and wrist extensors  Tenderness     Right Elbow   Tenderness in the antecubital fossa and distal biceps tendon  Right Wrist/Hand   Tenderness in the distal biceps tendon       Active Range of Motion   Left Shoulder   Flexion: 130 degrees   Abduction: 135 degrees     Right Shoulder   Flexion: 140 degrees   Abduction: 120 degrees     Left Elbow   Flexion: 150 degrees   Extension: -5 degrees   Forearm supination: 90 degrees   Forearm pronation: 110 degrees     Right Elbow   Flexion: 160 degrees   Extension: -18 degrees with pain  Forearm supination: 95 degrees   Forearm pronation: 115 degrees     Passive Range of Motion     Left Elbow   Flexion: 155 degrees   Extension: 5 degrees     Right Elbow   Flexion: WFL  Extension: -10 degrees with pain    Strength/Myotome Testing     Left Shoulder     Planes of Motion   Flexion: 4   Abduction: 4     Right Shoulder     Planes of Motion   Flexion: 4-   Abduction: 4-     Left Elbow   Flexion: 4+  Extension: 4+  Forearm supination: 4  Forearm pronation: 4    Right Elbow   Flexion: 4-  Extension: 4-  Forearm supination: 4-  Forearm pronation: 4    Left Wrist/Hand   Wrist extension: 4+  Wrist flexion: 4+  Radial deviation: 4+  Ulnar deviation: 4+     (2nd hand position)     Trial 1: 27    Right Wrist/Hand   Wrist extension: 4-  Wrist flexion: 4-  Radial deviation: 4-  Ulnar deviation: 4-     (2nd hand position)     Trial 1: 29      Flowsheet Rows      Most Recent Value   PT/OT G-Codes   Current Score  71   Projected Score  75   Assessment Type  Evaluation   G code set  Carrying, Moving & Handling Objects   Carrying, Moving and Handling Objects Current Status ()  CJ   Carrying, Moving and Handling Objects Goal Status ()  CJ          Precautions: thyroid disorder, migraines     Daily Treatment Diary     Manual              Passive elbow ROM             Passive sup/pron ROM                                                        Exercise Diary              Pulleys                          Active elbow curl 3-way             Active forearm sup/pronation             Wrist AROM 4-way 2#            Elbow flex isometric             Shoulder flex   AROM 2#              Scap retraction                                                                                                                                                                             Modalities

## 2018-10-22 NOTE — PROGRESS NOTES
PT Evaluation     Today's date: 10/22/2018  Patient name: Saud Hester  : 1940  MRN: [id number]  Referring provider: Ho Tran DO  Dx:   Encounter Diagnosis     ICD-10-CM    1  Rupture of right distal biceps tendon, subsequent encounter S46 211D                   Assessment  Impairments: abnormal coordination, abnormal or restricted ROM, activity intolerance, impaired physical strength and pain with function    Assessment details: Pt is a pleasant 77 y o  female presenting to outpatient physical therapy with Rupture of right distal biceps tendon, subsequent encounter  (primary encounter diagnosis)   Pt presents with pain, decreased range of motion, decreased strength, and decreased tolerance to activity  Pt is a good candidate for outpatient physical therapy and would benefit from skilled physical therapy to address limitations and to achieve goals  Thank you for this referral    Understanding of Dx/Px/POC: good   Prognosis: good    Goals  ST  Patient will report 25% decrease in pain in 4 weeks  2  Patient will demonstrate 25% improvement in ROM in 4 weeks  3  Patient will demonstrate 1/2 grade improvement in strength in 4 weeks  LT  Patient will be able to perform IADLS without restriction or pain by discharge  2  Patient will be independent in HEP by discharge  3  Patient will be able to return to recreational/work duties without restriction or pain by discharge        Plan  Patient would benefit from: PT eval and skilled PT  Planned modality interventions: cryotherapy and thermotherapy: hydrocollator packs  Planned therapy interventions: IADL retraining, body mechanics training, flexibility, functional ROM exercises, home exercise program, neuromuscular re-education, manual therapy, postural training, strengthening, stretching, therapeutic activities, therapeutic exercise, joint mobilization and ADL training  Frequency: 2x week  Duration in visits: 12  Duration in weeks: 6  Treatment plan discussed with: patient        Subjective Evaluation    History of Present Illness  Mechanism of injury: Pt reports she was attempting to lift an item out of a box while she was moving, when she notes she heard a pop and had significant pain  Reports radiographs were negative  Notes she was then referred to an orthopedic physician  States MRI revealed bicep rupture, however, opted to trial PT before discussing surgery  Notes she has had great reduction in pain levels since initial incident  States she has been avoiding heavy lifting and vacuuming due to fear of doing further damage  Reports she has been experiencing numbness in area of anterior right arm/elbow  Notes orthopedic physician instructed her that she has a benign blood clot in right elbow  Pain  Current pain ratin  At best pain ratin  At worst pain ratin    Social Support  Lives with: alone          Objective     Observations     Right Elbow   Negative for edema  Palpation     Right   No palpable tenderness to the brachioradialis  Tenderness of the biceps and wrist extensors  Tenderness     Right Elbow   Tenderness in the antecubital fossa and distal biceps tendon  Right Wrist/Hand   Tenderness in the distal biceps tendon       Active Range of Motion   Left Shoulder   Flexion: 130 degrees   Abduction: 135 degrees     Right Shoulder   Flexion: 140 degrees   Abduction: 120 degrees     Left Elbow   Flexion: 150 degrees   Extension: -5 degrees   Forearm supination: 90 degrees   Forearm pronation: 110 degrees     Right Elbow   Flexion: 160 degrees   Extension: -18 degrees with pain  Forearm supination: 95 degrees   Forearm pronation: 115 degrees     Passive Range of Motion     Left Elbow   Flexion: 155 degrees   Extension: 5 degrees     Right Elbow   Flexion: WFL  Extension: -10 degrees with pain    Strength/Myotome Testing     Left Shoulder     Planes of Motion   Flexion: 4   Abduction: 4     Right Shoulder Planes of Motion   Flexion: 4-   Abduction: 4-     Left Elbow   Flexion: 4+  Extension: 4+  Forearm supination: 4  Forearm pronation: 4    Right Elbow   Flexion: 4-  Extension: 4-  Forearm supination: 4-  Forearm pronation: 4    Left Wrist/Hand   Wrist extension: 4+  Wrist flexion: 4+  Radial deviation: 4+  Ulnar deviation: 4+     (2nd hand position)     Trial 1: 27    Right Wrist/Hand   Wrist extension: 4-  Wrist flexion: 4-  Radial deviation: 4-  Ulnar deviation: 4-     (2nd hand position)     Trial 1: 29      Flowsheet Rows      Most Recent Value   PT/OT G-Codes   Current Score  71   Projected Score  75   Assessment Type  Evaluation   G code set  Carrying, Moving & Handling Objects   Carrying, Moving and Handling Objects Current Status ()  CJ   Carrying, Moving and Handling Objects Goal Status ()  CJ          Precautions: thyroid disorder, migraines     Daily Treatment Diary     Manual              Passive elbow ROM             Passive sup/pron ROM                                                        Exercise Diary              Pulleys                          Active elbow curl 3-way             Active forearm sup/pronation             Wrist AROM 4-way 2#            Elbow flex isometric             Shoulder flex   AROM 2#              Scap retraction                                                                                                                                                                             Modalities

## 2018-10-25 ENCOUNTER — OFFICE VISIT (OUTPATIENT)
Dept: PHYSICAL THERAPY | Facility: CLINIC | Age: 78
End: 2018-10-25
Payer: MEDICARE

## 2018-10-25 DIAGNOSIS — S46.211D RUPTURE OF RIGHT DISTAL BICEPS TENDON, SUBSEQUENT ENCOUNTER: Primary | ICD-10-CM

## 2018-10-25 PROCEDURE — 97140 MANUAL THERAPY 1/> REGIONS: CPT | Performed by: PHYSICAL THERAPIST

## 2018-10-25 PROCEDURE — 97110 THERAPEUTIC EXERCISES: CPT | Performed by: PHYSICAL THERAPIST

## 2018-10-25 NOTE — PROGRESS NOTES
Daily Note     Today's date: 10/25/2018  Patient name: Nawaf Ortega  : 1940  MRN: [id number]  Referring provider: Carlos Diop DO  Dx:   Encounter Diagnosis     ICD-10-CM    1  Rupture of right distal biceps tendon, subsequent encounter S46 641D                   Subjective: Pt comes to therapy reporting she was compliant with HEP and denies difficulty  Objective: See treatment diary below    Precautions: thyroid disorder, migraines     Daily Treatment Diary     Manual  10/25            Passive elbow ROM perf CHANDLER            Passive sup/pron ROM perf CHANDLER                                                       Exercise Diary  10/25            Pulleys 5'                         Active elbow curl 3-way 30x ea            Active forearm sup/pronation 30x ea            Wrist AROM 4-way 2#  2x10            Elbow flex isometric 5"x10            Shoulder flex   AROM 2#              Scap retraction 3"x15                                                                                              Modalities                                                         Assessment: Tolerated treatment well  Patient demonstrated fatigue post treatment, exhibited good technique with therapeutic exercises and would benefit from continued PT      Plan: Progress treatment as tolerated

## 2018-10-26 ENCOUNTER — OFFICE VISIT (OUTPATIENT)
Dept: PHYSICAL THERAPY | Facility: CLINIC | Age: 78
End: 2018-10-26
Payer: MEDICARE

## 2018-10-26 DIAGNOSIS — S46.211D RUPTURE OF RIGHT DISTAL BICEPS TENDON, SUBSEQUENT ENCOUNTER: Primary | ICD-10-CM

## 2018-10-26 PROCEDURE — 97110 THERAPEUTIC EXERCISES: CPT

## 2018-10-26 PROCEDURE — 97140 MANUAL THERAPY 1/> REGIONS: CPT

## 2018-10-26 NOTE — PROGRESS NOTES
Daily Note     Today's date: 10/26/2018  Patient name: Sushil Art  : 1940  MRN: [id number]  Referring provider: Mirna Sutton DO  Dx:   Encounter Diagnosis     ICD-10-CM    1  Rupture of right distal biceps tendon, subsequent encounter S46 211D                   Subjective: Pt denied pain in elbow prior to beginning today, but notable pain in right thumb/CMC jt  She denied soreness following last visit  Objective: See treatment diary below    Precautions: thyroid disorder, migraines     Daily Treatment Diary     Manual  10/25 10/26           Passive elbow ROM perf CHANDLER perf  TE           Passive sup/pron ROM perf CHANDLER perf  TE                                                      Exercise Diary  10/25 10/26           Pulleys 5' 5'                        Active elbow curl 3-way 30x ea 30x ea           Active forearm sup/pronation 30x ea 30 ea           Wrist AROM 4-way 2#  2x10 2#  2x10           Elbow flex isometric 5"x10 5"x10           Shoulder flex   AROM 2# 2# nv             Scap retraction 3"x15 3"x15                                                                                             Modalities                                                         Assessment: Tolerated treatment well  Patient demonstrated fatigue post treatment, exhibited good technique with therapeutic exercises and would benefit from continued PT      Plan: Progress treatment as tolerated

## 2018-10-30 ENCOUNTER — OFFICE VISIT (OUTPATIENT)
Dept: PHYSICAL THERAPY | Facility: CLINIC | Age: 78
End: 2018-10-30
Payer: MEDICARE

## 2018-10-30 DIAGNOSIS — S46.211D RUPTURE OF RIGHT DISTAL BICEPS TENDON, SUBSEQUENT ENCOUNTER: Primary | ICD-10-CM

## 2018-10-30 PROCEDURE — 97110 THERAPEUTIC EXERCISES: CPT

## 2018-10-30 PROCEDURE — 97140 MANUAL THERAPY 1/> REGIONS: CPT

## 2018-10-30 NOTE — PROGRESS NOTES
Daily Note     Today's date: 10/30/2018  Patient name: Trung Jordan  : 1940  MRN: [id number]  Referring provider: Ann-Marie oSlis DO  Dx:   Encounter Diagnosis     ICD-10-CM    1  Rupture of right distal biceps tendon, subsequent encounter S46 142D                   Subjective: Pt denied pain in elbow prior to beginning today  She denied soreness following last visit  She further noted falling prior to attending PT this a m  In attempt to stand SLS to jordan sock and experienced LOB causing her to fall  She denied     Objective: See treatment diary below    Precautions: thyroid disorder, migraines     Daily Treatment Diary     Manual  10/25 10/26 10/30          Passive elbow ROM perf CHANDLER perf  TE perf  TE          Passive sup/pron ROM perf CHANDLER perf  TE perf TE                                                     Exercise Diary  10/25 10/26 10/30          Pulleys 5' 5' 5'                       Active elbow curl 3-way 30x ea 30x ea 30 ea          Active forearm sup/pronation 30x ea 30 ea 30 ea          Wrist AROM 4-way 2#  2x10 2#  2x10 2# 2x10          Elbow flex isometric 5"x10 5"x10 5"x10          Shoulder flex   AROM 2# 2# nv   2#  2x10          Scap retraction 3"x15 3"x15 3"x30                                                                                            Modalities                                                         Assessment: Tolerated treatment well  Added shldr flex with good challenge; mild pain noted upon completion, but not significant  Patient demonstrated fatigue post treatment, exhibited good technique with therapeutic exercises and would benefit from continued PT      Plan: Progress treatment as tolerated

## 2018-11-02 ENCOUNTER — OFFICE VISIT (OUTPATIENT)
Dept: PHYSICAL THERAPY | Facility: CLINIC | Age: 78
End: 2018-11-02
Payer: MEDICARE

## 2018-11-02 DIAGNOSIS — S46.211D RUPTURE OF RIGHT DISTAL BICEPS TENDON, SUBSEQUENT ENCOUNTER: Primary | ICD-10-CM

## 2018-11-02 PROCEDURE — 97140 MANUAL THERAPY 1/> REGIONS: CPT

## 2018-11-02 PROCEDURE — 97110 THERAPEUTIC EXERCISES: CPT

## 2018-11-02 NOTE — PROGRESS NOTES
Daily Note     Today's date: 2018  Patient name: Trip Castillo  : 1940  MRN: [id number]  Referring provider: Tracey Smith DO  Dx:   Encounter Diagnosis     ICD-10-CM    1  Rupture of right distal biceps tendon, subsequent encounter S46 852D                   Subjective: Pt denied pain  prior to beginning today, however, noted "weird sensation" in proximal forearm  She denied soreness following last visit  Objective: See treatment diary below    Precautions: thyroid disorder, migraines     Daily Treatment Diary     Manual  10/25 10/26 10/30 11/2         Passive elbow ROM perf CHANDLER perf  TE perf  TE perf  TE         Passive sup/pron ROM perf CHANDLER perf  TE perf TE perf  TE                                                    Exercise Diary  10/25 10/26 10/30 11         Pulleys 5' 5' 5' 5'                      Active elbow curl 3-way 30x ea 30x ea 30 ea 30 ea         Active forearm sup/pronation 30x ea 30 ea 30 ea 30 ea            Wrist AROM 4-way 2#  2x10 2#  2x10 2# 2x10 2#  3x10         Elbow flex isometric 5"x10 5"x10 5"x10 5"x10         Shoulder flex   AROM 2# 2# nv   2#  2x10 2#  2x10         Scap retraction 3"x15 3"x15 3"x30 3"x30                                                                                           Modalities                                                         Assessment: Tolerated treatment well  Improved tolerance with exercises  Patient demonstrated fatigue post treatment, exhibited good technique with therapeutic exercises and would benefit from continued PT      Plan: Progress treatment as tolerated

## 2018-11-06 ENCOUNTER — OFFICE VISIT (OUTPATIENT)
Dept: PHYSICAL THERAPY | Facility: CLINIC | Age: 78
End: 2018-11-06
Payer: MEDICARE

## 2018-11-06 DIAGNOSIS — S46.211D RUPTURE OF RIGHT DISTAL BICEPS TENDON, SUBSEQUENT ENCOUNTER: Primary | ICD-10-CM

## 2018-11-06 PROCEDURE — 97110 THERAPEUTIC EXERCISES: CPT

## 2018-11-06 PROCEDURE — 97140 MANUAL THERAPY 1/> REGIONS: CPT

## 2018-11-06 NOTE — PROGRESS NOTES
Daily Note     Today's date: 2018  Patient name: Trip Castillo  : 1940  MRN: [id number]  Referring provider: Tracey Smith DO  Dx:   Encounter Diagnosis     ICD-10-CM    1  Rupture of right distal biceps tendon, subsequent encounter S46 063D                   Subjective: Pt denied pain  prior to beginning today, but noted numbness in right thumb and proximal wrist     Objective: See treatment diary below    Precautions: thyroid disorder, migraines     Daily Treatment Diary     Manual  10/25 10/26 10/30 11/2 11/6        Passive elbow ROM perf CHANDLER perf  TE perf  TE perf  TE perf  TE        Passive sup/pron ROM perf CHANDLER perf  TE perf TE perf  TE perf  TE                                                   Exercise Diary  10/25 10/26 10/30 11/2 116        Pulleys 5' 5' 5' 5' 5'                     Active elbow curl 3-way 30x ea 30x ea 30 ea 30 ea 30 ea        Active forearm sup/pronation 30x ea 30 ea 30 ea 30 ea    30 ea        Wrist AROM 4-way 2#  2x10 2#  2x10 2# 2x10 2#  3x10 2#  3x10        Elbow flex isometric 5"x10 5"x10 5"x10 5"x10 5"x10        Shoulder flex   AROM 2# 2# nv   2#  2x10 2#  2x10 2# 2x10        Scap retraction 3"x15 3"x15 3"x30 3"x30 3"x30                                                                                          Modalities                                                         Assessment: Tolerated treatment well  Patient demonstrated fatigue post treatment, exhibited good technique with therapeutic exercises and would benefit from continued PT      Plan: Progress treatment as tolerated

## 2018-11-09 ENCOUNTER — OFFICE VISIT (OUTPATIENT)
Dept: PHYSICAL THERAPY | Facility: CLINIC | Age: 78
End: 2018-11-09
Payer: MEDICARE

## 2018-11-09 DIAGNOSIS — S46.211D RUPTURE OF RIGHT DISTAL BICEPS TENDON, SUBSEQUENT ENCOUNTER: Primary | ICD-10-CM

## 2018-11-09 PROCEDURE — 97110 THERAPEUTIC EXERCISES: CPT

## 2018-11-09 PROCEDURE — 97140 MANUAL THERAPY 1/> REGIONS: CPT

## 2018-11-09 NOTE — PROGRESS NOTES
Daily Note     Today's date: 2018  Patient name: Yumiko Haider  : 1940  MRN: [id number]  Referring provider: Virgil Elliott DO  Dx:   Encounter Diagnosis     ICD-10-CM    1  Rupture of right distal biceps tendon, subsequent encounter S46 640D                   Subjective: Pt denied pain  prior to beginning today  She continues to report compliance with HEP  Objective: See treatment diary below    Precautions: thyroid disorder, migraines     Daily Treatment Diary     Manual  10/25 10/26 10/30 11/2 11/6 11/9       Passive elbow ROM perf CHANDLER perf  TE perf  TE perf  TE perf  TE perf  TE       Passive sup/pron ROM perf CHANDLER perf  TE perf TE perf  TE perf  TE perf  TE                                                  Exercise Diary  10/25 10/26 10/30 11/2 11/6 11/9       Pulleys 5' 5' 5' 5' 5' 5'                    Active elbow curl 3-way 30x ea 30x ea 30 ea 30 ea 30 ea 30 ea       Active forearm sup/pronation 30x ea 30 ea 30 ea 30 ea    30 ea 30 ea       Wrist AROM 4-way 2#  2x10 2#  2x10 2# 2x10 2#  3x10 2#  3x10 2#  3x10       Elbow flex isometric 5"x10 5"x10 5"x10 5"x10 5"x10 5"x10       Shoulder flex   AROM 2# 2# nv   2#  2x10 2#  2x10 2# 2x10 2# 2x10       Scap retraction 3"x15 3"x15 3"x30 3"x30 3"x30 3"x30                                                                                         Modalities                                                         Assessment: Tolerated treatment well  Patient demonstrated fatigue post treatment, exhibited good technique with therapeutic exercises and would benefit from continued PT      Plan: Progress treatment as tolerated

## 2018-11-13 ENCOUNTER — OFFICE VISIT (OUTPATIENT)
Dept: PHYSICAL THERAPY | Facility: CLINIC | Age: 78
End: 2018-11-13
Payer: MEDICARE

## 2018-11-13 DIAGNOSIS — S46.211D RUPTURE OF RIGHT DISTAL BICEPS TENDON, SUBSEQUENT ENCOUNTER: Primary | ICD-10-CM

## 2018-11-13 PROCEDURE — 97140 MANUAL THERAPY 1/> REGIONS: CPT

## 2018-11-13 PROCEDURE — 97110 THERAPEUTIC EXERCISES: CPT

## 2018-11-13 NOTE — PROGRESS NOTES
Daily Note     Today's date: 2018  Patient name: Devonna Olszewski  : 1940  MRN: [id number]  Referring provider: Mike London DO  Dx:   Encounter Diagnosis     ICD-10-CM    1  Rupture of right distal biceps tendon, subsequent encounter S46 278D                   Subjective: Pt denied pain  prior to beginning today  She continues to report compliance with HEP  Objective: See treatment diary below    Precautions: thyroid disorder, migraines     Daily Treatment Diary     Manual  10/25 10/26 10/30 11/2 11/6 11/9 11/13      Passive elbow ROM perf CHANDLER perf  TE perf  TE perf  TE perf  TE perf  TE perf  TE      Passive sup/pron ROM perf CHANDLER perf  TE perf TE perf  TE perf  TE perf  TE perf  TE                                                 Exercise Diary  10/25 10/26 10/30 11/2 11/6 11/9 11/13      Pulleys 5' 5' 5' 5' 5' 5' 5'                   Active elbow curl 3-way 30x ea 30x ea 30 ea 30 ea 30 ea 30 ea 1# x10      Active forearm sup/pronation 30x ea 30 ea 30 ea 30 ea    30 ea 30 ea 1# x10      Wrist AROM 4-way 2#  2x10 2#  2x10 2# 2x10 2#  3x10 2#  3x10 2#  3x10 2#  3x10      Elbow flex isometric 5"x10 5"x10 5"x10 5"x10 5"x10 5"x10       Shoulder flex   AROM 2# 2# nv   2#  2x10 2#  2x10 2# 2x10 2# 2x10 2# 2x10      Scap retraction 3"x15 3"x15 3"x30 3"x30 3"x30 3"x30 otb 3"x15                                                                                        Modalities                                                         Assessment: Tolerated treatment well  Patient demonstrated fatigue post treatment, exhibited good technique with therapeutic exercises and would benefit from continued PT      Plan: Progress treatment as tolerated

## 2018-11-13 NOTE — PROGRESS NOTES
Daily Note     Today's date: 2018  Patient name: Devonna Olszewski  : 1940  MRN: [id number]  Referring provider: Mike London DO  Dx:   Encounter Diagnosis     ICD-10-CM    1  Rupture of right distal biceps tendon, subsequent encounter S46 211D                   Subjective: Pt denied pain  prior to beginning today  She continues to report compliance with HEP  Objective: See treatment diary below    Precautions: thyroid disorder, migraines     Daily Treatment Diary     Manual  10/25 10/26 10/30 11/2 11/6 11/9       Passive elbow ROM perf CHANDLER perf  TE perf  TE perf  TE perf  TE perf  TE       Passive sup/pron ROM perf CHANDLER perf  TE perf TE perf  TE perf  TE perf  TE                                                  Exercise Diary  10/25 10/26 10/30 11/2 11/6 11/9 11/13      Pulleys 5' 5' 5' 5' 5' 5' 5'                   Active elbow curl 3-way 30x ea 30x ea 30 ea 30 ea 30 ea 30 ea       Active forearm sup/pronation 30x ea 30 ea 30 ea 30 ea    30 ea 30 ea       Wrist AROM 4-way 2#  2x10 2#  2x10 2# 2x10 2#  3x10 2#  3x10 2#  3x10       Elbow flex isometric 5"x10 5"x10 5"x10 5"x10 5"x10 5"x10       Shoulder flex   AROM 2# 2# nv   2#  2x10 2#  2x10 2# 2x10 2# 2x10       Scap retraction 3"x15 3"x15 3"x30 3"x30 3"x30 3"x30                                                                                         Modalities                                                         Assessment: Tolerated treatment well  Patient demonstrated fatigue post treatment, exhibited good technique with therapeutic exercises and would benefit from continued PT      Plan: Progress treatment as tolerated

## 2018-11-16 ENCOUNTER — OFFICE VISIT (OUTPATIENT)
Dept: PHYSICAL THERAPY | Facility: CLINIC | Age: 78
End: 2018-11-16
Payer: MEDICARE

## 2018-11-16 DIAGNOSIS — S46.211D RUPTURE OF RIGHT DISTAL BICEPS TENDON, SUBSEQUENT ENCOUNTER: Primary | ICD-10-CM

## 2018-11-16 PROCEDURE — 97140 MANUAL THERAPY 1/> REGIONS: CPT

## 2018-11-16 PROCEDURE — 97110 THERAPEUTIC EXERCISES: CPT

## 2018-11-16 NOTE — PROGRESS NOTES
Daily Note     Today's date: 2018  Patient name: Jane Barger  : 1940  MRN: [id number]  Referring provider: John Valdes DO  Dx:   Encounter Diagnosis     ICD-10-CM    1  Rupture of right distal biceps tendon, subsequent encounter S46 471D                   Subjective: Pt reported soreness prior to beginning today despite progressions last visit and snow removal from her car this a m  She noted only mild soreness following last visit  Objective: See treatment diary below    Precautions: thyroid disorder, migraines     Daily Treatment Diary     Manual  10/25 10/26 10/30 11/2 11/6 11/9 11/13 11/16     Passive elbow ROM perf CHANDLER perf  TE perf  TE perf  TE perf  TE perf  TE perf  TE perf  TE     Passive sup/pron ROM perf CHANDLER perf  TE perf TE perf  TE perf  TE perf  TE perf  TE perf  TE                                                Exercise Diary  10/25 10/26 10/30 11/2 11/6 11/9 11/13 11/16     Pulleys 5' 5' 5' 5' 5' 5' 5' 5'                  Active elbow curl 3-way 30x ea 30x ea 30 ea 30 ea 30 ea 30 ea 1# x10 1#nv  30     Active forearm sup/pronation 30x ea 30 ea 30 ea 30 ea    30 ea 30 ea 1# x10 1#nv  30     Wrist AROM 4-way 2#  2x10 2#  2x10 2# 2x10 2#  3x10 2#  3x10 2#  3x10 2#  3x10 2#  3x10     Elbow flex isometric 5"x10 5"x10 5"x10 5"x10 5"x10 5"x10       Shoulder flex   AROM 2# 2# nv   2#  2x10 2#  2x10 2# 2x10 2# 2x10 2# 2x10 2# 2x10     Scap retraction 3"x15 3"x15 3"x30 3"x30 3"x30 3"x30 otb 3"x15 otb nv  3x10                                                                                       Modalities                                                         Assessment: Tolerated treatment well  Held resistance with elbow exercises due to mild soreness prior to beginning today  Patient demonstrated fatigue post treatment, exhibited good technique with therapeutic exercises and would benefit from continued PT  Plan: Progress treatment as tolerated

## 2018-11-20 ENCOUNTER — EVALUATION (OUTPATIENT)
Dept: PHYSICAL THERAPY | Facility: CLINIC | Age: 78
End: 2018-11-20
Payer: MEDICARE

## 2018-11-20 DIAGNOSIS — S46.211D RUPTURE OF RIGHT DISTAL BICEPS TENDON, SUBSEQUENT ENCOUNTER: Primary | ICD-10-CM

## 2018-11-20 PROCEDURE — 97110 THERAPEUTIC EXERCISES: CPT | Performed by: PHYSICAL THERAPIST

## 2018-11-20 PROCEDURE — G8991 OTHER PT/OT GOAL STATUS: HCPCS | Performed by: PHYSICAL THERAPIST

## 2018-11-20 PROCEDURE — G8990 OTHER PT/OT CURRENT STATUS: HCPCS | Performed by: PHYSICAL THERAPIST

## 2018-11-20 PROCEDURE — 97112 NEUROMUSCULAR REEDUCATION: CPT | Performed by: PHYSICAL THERAPIST

## 2018-11-20 NOTE — PROGRESS NOTES
PT Re-Evaluation     Today's date: 2018  Patient name: Erasmo Lopez  : 1940  MRN: [id number]  Referring provider: Katerina Elliott DO  Dx:   Encounter Diagnosis     ICD-10-CM    1  Rupture of right distal biceps tendon, subsequent encounter S46 211D                   Assessment  Impairments: abnormal coordination, abnormal or restricted ROM, activity intolerance, impaired physical strength and pain with function    Assessment details: Erasmo Lopez has been treated in outpatient physical therapy over the past 4 weeks for diagnosis/complaints of Rupture of right distal biceps tendon, subsequent encounter  (primary encounter diagnosis)  Pt demonstrates increased range of motion, improved strength, decreased pain, and increased activity tolerance  Pt continues to present with pain, decreased strength, and decreased tolerance to activity  Pt would continue to benefit from skilled physical therapy to address limitations and to achieve goals  Thank you for this referral   Understanding of Dx/Px/POC: good   Prognosis: good    Goals  ST  Patient will report 25% decrease in pain in 4 weeks  - MET  2  Patient will demonstrate 25% improvement in ROM in 4 weeks  - MET  3  Patient will demonstrate 1/2 grade improvement in strength in 4 weeks  - MET    LT  Patient will be able to perform IADLS without restriction or pain by discharge  2  Patient will be independent in HEP by discharge  3  Patient will be able to return to recreational/work duties without restriction or pain by discharge        Plan  Patient would benefit from: PT eval and skilled PT  Planned modality interventions: cryotherapy and thermotherapy: hydrocollator packs  Planned therapy interventions: IADL retraining, body mechanics training, flexibility, functional ROM exercises, home exercise program, neuromuscular re-education, manual therapy, postural training, strengthening, stretching, therapeutic activities, therapeutic exercise, joint mobilization and ADL training  Frequency: 2x week  Duration in visits: 6  Duration in weeks: 3  Treatment plan discussed with: patient        Subjective Evaluation    History of Present Illness  Mechanism of injury: 10/22: Pt reports she was attempting to lift an item out of a box while she was moving, when she notes she heard a pop and had significant pain  Reports radiographs were negative  Notes she was then referred to an orthopedic physician  States MRI revealed bicep rupture, however, opted to trial PT before discussing surgery  Notes she has had great reduction in pain levels since initial incident  States she has been avoiding heavy lifting and vacuuming due to fear of doing further damage  Reports she has been experiencing numbness in area of anterior right arm/elbow  Notes orthopedic physician instructed her that she has a benign blood clot in right elbow  : Pt reports continued difficulty with lifting and excessive activity  Notes she has been performing home exercises without concern  Notes she avoids lifting items with right UE due to fear of reinjure or feel pain  Reports the heaviest item she has attempted is her purse and a 15 pound bag of cat liter using both hands  Follows up with orthopedic physician next week  Remaining goals are to be able to lift  Pain  Current pain ratin  At best pain ratin  At worst pain rating: 3    Social Support  Lives with: alone    Patient Goals  Patient goals for therapy: decreased pain, increased motion, increased strength and independence with ADLs/IADLs          Objective     Observations     Right Elbow   Negative for edema  Palpation     Right   No palpable tenderness to the brachioradialis  Tenderness of the biceps and wrist extensors  Tenderness     Right Elbow   Tenderness in the antecubital fossa and distal biceps tendon  Right Wrist/Hand   Tenderness in the distal biceps tendon       Active Range of Motion   Left Shoulder Flexion: 130 degrees   Abduction: 135 degrees     Right Shoulder   Flexion: 155 degrees   Abduction: 145 degrees     Left Elbow   Flexion: 150 degrees   Extension: -5 degrees   Forearm supination: 90 degrees   Forearm pronation: 110 degrees     Right Elbow   Flexion: 155 degrees   Extension: -10 degrees with pain  Forearm supination: 98 degrees   Forearm pronation: 107 degrees     Passive Range of Motion     Left Elbow   Flexion: 155 degrees   Extension: 5 degrees     Right Elbow   Flexion: WFL  Extension: 0 degrees     Strength/Myotome Testing     Left Shoulder     Planes of Motion   Flexion: 4   Abduction: 4     Right Shoulder     Planes of Motion   Flexion: 4   Abduction: 4     Left Elbow   Flexion: 4+  Extension: 4+  Forearm supination: 4  Forearm pronation: 4    Right Elbow   Flexion: 4+  Extension: 4+  Forearm supination: 4-  Forearm pronation: 4+    Left Wrist/Hand   Wrist extension: 4+  Wrist flexion: 4+  Radial deviation: 4+  Ulnar deviation: 4+     (2nd hand position)     Trial 1: 27    Right Wrist/Hand   Wrist extension: 4-  Wrist flexion: 4-  Radial deviation: 4-  Ulnar deviation: 4-     (2nd hand position)     Trial 1: 29        Precautions: thyroid disorder, migraines     Daily Treatment Diary     Manual  10/25 10/26 10/30 11/2 11/6 11/9 11/13 11/16 11/20    Passive elbow ROM perf CHANDLER perf  TE perf  TE perf  TE perf  TE perf  TE perf  TE perf  TE     Passive sup/pron ROM perf CHANDLER perf  TE perf TE perf  TE perf  TE perf  TE perf  TE perf  TE                                                Exercise Diary  10/25 10/26 10/30 11/2 11/6 11/9 11/13 11/16 11/20    Pulleys 5' 5' 5' 5' 5' 5' 5' 5' 5'                 Active elbow curl 3-way 30x ea 30x ea 30 ea 30 ea 30 ea 30 ea 1# x10 1#nv  30 3# x10    Active forearm sup/pronation 30x ea 30 ea 30 ea 30 ea    30 ea 30 ea 1# x10 1#nv  30 3# x10    Wrist AROM 4-way 2#  2x10 2#  2x10 2# 2x10 2#  3x10 2#  3x10 2#  3x10 2#  3x10 2#  3x10 D/C    Elbow flex isometric 5"x10 5"x10 5"x10 5"x10 5"x10 5"x10       Elbow extension eccentric         5# x10    Shoulder flex   AROM 2# 2# nv   2#  2x10 2#  2x10 2# 2x10 2# 2x10 2# 2x10 2# 2x10 3#  nv    Scap retraction 3"x15 3"x15 3"x30 3"x30 3"x30 3"x30 otb 3"x15 otb nv  3x10 otb 3x10                 TB bicep curl         otb    TB supination         otb                                               Modalities

## 2018-11-23 ENCOUNTER — OFFICE VISIT (OUTPATIENT)
Dept: PHYSICAL THERAPY | Facility: CLINIC | Age: 78
End: 2018-11-23
Payer: MEDICARE

## 2018-11-23 DIAGNOSIS — S46.211D RUPTURE OF RIGHT DISTAL BICEPS TENDON, SUBSEQUENT ENCOUNTER: Primary | ICD-10-CM

## 2018-11-23 PROCEDURE — 97110 THERAPEUTIC EXERCISES: CPT

## 2018-11-23 NOTE — PROGRESS NOTES
Daily Note     Today's date: 2018  Patient name: Yue Yuan  : 1940  MRN: [id number]  Referring provider: Chely Frias DO  Dx:   Encounter Diagnosis     ICD-10-CM    1  Rupture of right distal biceps tendon, subsequent encounter S46 211D                   Subjective: Pt reports with c/o mild soreness in R elbow/UE following last visit graded 3-4/10  She noted taking Tylenol for pain relief with little help  Objective: See treatment diary below  Precautions: thyroid disorder, migraines     Daily Treatment Diary     Manual  10/25 10/26 10/30 11/2 11/6 11/9 11/13 11/16 11/20    Passive elbow ROM perf CHANDLER perf  TE perf  TE perf  TE perf  TE perf  TE perf  TE perf  TE     Passive sup/pron ROM perf CHANDLER perf  TE perf TE perf  TE perf  TE perf  TE perf  TE perf  TE                                                Exercise Diary  10/25 10/26 10/30 11/2 11/6 11/9 11/13 11/16 11/20 11/23   Pulleys 5' 5' 5' 5' 5' 5' 5' 5' 5' 5'                Active elbow curl 3-way 30x ea 30x ea 30 ea 30 ea 30 ea 30 ea 1# x10 1#nv  30 3# x10 3# 2x10   Active forearm sup/pronation 30x ea 30 ea 30 ea 30 ea    30 ea 30 ea 1# x10 1#nv  30 3# x10 2# 2x10   Wrist AROM 4-way 2#  2x10 2#  2x10 2# 2x10 2#  3x10 2#  3x10 2#  3x10 2#  3x10 2#  3x10 D/C ---   Elbow flex isometric 5"x10 5"x10 5"x10 5"x10 5"x10 5"x10       Elbow extension eccentric         5# x10 5# 2x10   Shoulder flex   AROM 2# 2# nv   2#  2x10 2#  2x10 2# 2x10 2# 2x10 2# 2x10 2# 2x10 3#  nv 3# 2x10   Scap retraction 3"x15 3"x15 3"x30 3"x30 3"x30 3"x30 otb 3"x15 otb nv  3x10 otb 3x10 otb 3x10                TB bicep curl         otb otb 2x10   TB supination         otb otb 20x                                              Modalities                                                         Assessment: Tolerated treatment well   Challenged with current program  Patient demonstrated fatigue post treatment, exhibited good technique with therapeutic exercises and would benefit from continued PT      Plan: Continue per plan of care

## 2018-11-27 ENCOUNTER — OFFICE VISIT (OUTPATIENT)
Dept: PHYSICAL THERAPY | Facility: CLINIC | Age: 78
End: 2018-11-27
Payer: MEDICARE

## 2018-11-27 DIAGNOSIS — Z12.39 SCREENING FOR MALIGNANT NEOPLASM OF BREAST: Primary | ICD-10-CM

## 2018-11-27 DIAGNOSIS — S46.211D RUPTURE OF RIGHT DISTAL BICEPS TENDON, SUBSEQUENT ENCOUNTER: Primary | ICD-10-CM

## 2018-11-27 PROCEDURE — 97110 THERAPEUTIC EXERCISES: CPT

## 2018-11-27 NOTE — PROGRESS NOTES
Daily Note     Today's date: 2018  Patient name: Yadira Moctezuma  : 1940  MRN: [id number]  Referring provider: Matt Sigala DO  Dx:   Encounter Diagnosis     ICD-10-CM    1  Rupture of right distal biceps tendon, subsequent encounter S46 211D                   Subjective: Pt reports with c/o mild soreness in R elbow/UE following last visit graded 3-4/10  Objective: See treatment diary below  Precautions: thyroid disorder, migraines     Daily Treatment Diary     Manual  10/25 10/26 10/30 11/2 11/6 11/9 11/13 11/16 11/20 11/27   Passive elbow ROM perf CHANDLER perf  TE perf  TE perf  TE perf  TE perf  TE perf  TE perf  TE  nv   Passive sup/pron ROM perf CHANDLER perf  TE perf TE perf  TE perf  TE perf  TE perf  TE perf  TE  nv                                              Exercise Diary  11/27 10/26 10/30 11/2 11/6 11/9 11/13 11/16 11/20 11/23   Pulleys 5' 5' 5' 5' 5' 5' 5' 5' 5' 5'                Active elbow curl 3-way 3# 2x10 30x ea 30 ea 30 ea 30 ea 30 ea 1# x10 1#nv  30 3# x10 3# 2x10   Active forearm sup/pronation 2# 2x10 30 ea 30 ea 30 ea    30 ea 30 ea 1# x10 1#nv  30 3# x10 2# 2x10                Elbow flex isometric D/C 5"x10 5"x10 5"x10 5"x10 5"x10       Elbow extension eccentric 5# 2x10        5# x10 5# 2x10   Shoulder flex   AROM 2# 2x10 2# nv   2#  2x10 2#  2x10 2# 2x10 2# 2x10 2# 2x10 2# 2x10 3#  nv 3# 2x10   Scap retraction otb 3x10 3"x15 3"x30 3"x30 3"x30 3"x30 otb 3"x15 otb nv  3x10 otb 3x10 otb 3x10                TB bicep curl otb 2x10        otb otb 2x10   TB supination otb 2x10        otb otb 20x                                              Modalities                                                         Assessment: Tolerated treatment well  Patient demonstrated fatigue post treatment, exhibited good technique with therapeutic exercises and would benefit from continued PT      Plan: Continue per plan of care

## 2018-11-30 ENCOUNTER — OFFICE VISIT (OUTPATIENT)
Dept: PHYSICAL THERAPY | Facility: CLINIC | Age: 78
End: 2018-11-30
Payer: MEDICARE

## 2018-11-30 DIAGNOSIS — S46.211D RUPTURE OF RIGHT DISTAL BICEPS TENDON, SUBSEQUENT ENCOUNTER: Primary | ICD-10-CM

## 2018-11-30 DIAGNOSIS — M25.562 ACUTE PAIN OF LEFT KNEE: ICD-10-CM

## 2018-11-30 DIAGNOSIS — M25.552 LEFT HIP PAIN: Primary | ICD-10-CM

## 2018-11-30 PROCEDURE — 97110 THERAPEUTIC EXERCISES: CPT | Performed by: PHYSICAL THERAPIST

## 2018-11-30 NOTE — PROGRESS NOTES
Daily Note     Today's date: 2018  Patient name: Aime Potts  : 1940  MRN: [id number]  Referring provider: Mynor Márquez DO  Dx:   Encounter Diagnosis     ICD-10-CM    1  Rupture of right distal biceps tendon, subsequent encounter S46 404D                   Subjective: Pt reports her right arm has been "Feeling good lately " Notes she had follow up with orthopedic physician recently, who recommended she continued 14 more therapy visits  Comes to therapy with c/o of recent onset of LLE pain, denying trauma or falls  She reports increased edema throughout left LE over the past week, denying redness, tenderness, recent surgical procedures, or SOB or chest pain  States she scheduled consultation with PCP for LLE pain (2019)      Objective: See treatment diary below    Precautions: thyroid disorder, migraines     Daily Treatment Diary     Manual   11/30 10/30 11/2 11/6 11/9 11/13 11/16 11/20 11/27   Passive elbow ROM   perf  TE perf  TE perf  TE perf  TE perf  TE perf  TE  nv   Passive sup/pron ROM   perf TE perf  TE perf  TE perf  TE perf  TE perf  TE  nv                                              Exercise Diary  11/27 11/30 10/30 11/2 11/6 11/9 11/13 11/16 11/20 11/23   Pulleys 5' 5' 5' 5' 5' 5' 5' 5' 5' 5'                Active elbow curl 3-way 3# 2x10 3#  3x10 30 ea 30 ea 30 ea 30 ea 1# x10 1#nv  30 3# x10 3# 2x10   Active forearm sup/pronation 2# 2x10 3# 3x10 30 ea 30 ea    30 ea 30 ea 1# x10 1#nv  30 3# x10 2# 2x10                Elbow flex isometric D/C -- 5"x10 5"x10 5"x10 5"x10       Elbow extension eccentric 5# 2x10 5# 3x10       5# x10 5# 2x10   Shoulder flex   AROM 2# 2x10 2#    2#  2x10 2#  2x10 2# 2x10 2# 2x10 2# 2x10 2# 2x10 3#  nv 3# 2x10   Scap retraction otb 3x10 otb 3x10 3"x30 3"x30 3"x30 3"x30 otb 3"x15 otb nv  3x10 otb 3x10 otb 3x10                TB bicep curl otb 2x10 np       otb otb 2x10   TB supination otb 2x10 np       otb otb 20x Modalities                                                         Assessment: Tolerated treatment well  Patient demonstrated fatigue post treatment, exhibited good technique with therapeutic exercises and would benefit from continued PT  Held TB exercises due to fatigue and soreness  Therapist contacted patient's PCP regarding possible PT referral for LLE pain  Plan: Continue per plan of care

## 2018-12-04 ENCOUNTER — OFFICE VISIT (OUTPATIENT)
Dept: PHYSICAL THERAPY | Facility: CLINIC | Age: 78
End: 2018-12-04
Payer: MEDICARE

## 2018-12-04 DIAGNOSIS — M25.562 ACUTE PAIN OF BOTH KNEES: ICD-10-CM

## 2018-12-04 DIAGNOSIS — M25.561 ACUTE PAIN OF BOTH KNEES: ICD-10-CM

## 2018-12-04 DIAGNOSIS — S46.211D RUPTURE OF RIGHT DISTAL BICEPS TENDON, SUBSEQUENT ENCOUNTER: Primary | ICD-10-CM

## 2018-12-04 DIAGNOSIS — M54.32 SCIATICA OF LEFT SIDE: ICD-10-CM

## 2018-12-04 PROCEDURE — 97140 MANUAL THERAPY 1/> REGIONS: CPT

## 2018-12-04 PROCEDURE — 97110 THERAPEUTIC EXERCISES: CPT

## 2018-12-04 NOTE — PROGRESS NOTES
Daily Note     Today's date: 2018  Patient name: Nawaf Ortega  : 1940  MRN: [id number]  Referring provider: Carlos Diop DO  Dx:   Encounter Diagnosis     ICD-10-CM    1  Rupture of right distal biceps tendon, subsequent encounter S46 549D                   Subjective: Pt offered no complaints of pain in R UE prior to beginning today  She reported she continues with L LE pain which is worse with sitting and standing for prolonged periods  Objective: See treatment diary below    Precautions: thyroid disorder, migraines     Daily Treatment Diary     Manual      Passive elbow ROM   np perf  TE perf  TE perf  TE perf  TE perf  TE  nv   Passive sup/pron ROM   np perf  TE perf  TE perf  TE perf  TE perf  TE  nv   Lumbar roll   nv                                        Exercise Diary     Pulleys 5' 5' 5' 5' 5' 5' 5' 5' 5' 5'                Active elbow curl 3-way 3# 2x10 3#  3x10 3#  3x10 30 ea 30 ea 30 ea 1# x10 1#nv  30 3# x10 3# 2x10   Active forearm sup/pronation 2# 2x10 3# 3x10 3# 3x10 30 ea    30 ea 30 ea 1# x10 1#nv  30 3# x10 2# 2x10                Elbow flex isometric D/C -- ---  5"x10 5"x10       Elbow extension eccentric 5# 2x10 5# 3x10 5# 3x10      5# x10 5# 2x10   Shoulder flex   AROM 2# 2x10 2#    2#  2x10 2#  2x10 2# 2x10 2# 2x10 2# 2x10 2# 2x10 3#  nv 3# 2x10   Scap retraction otb 3x10 otb 3x10 3"x30 otb  3"x30 3"x30 3"x30 otb 3"x15 otb nv  3x10 otb 3x10 otb 3x10                TB bicep curl otb 2x10 np otb x30 otb   x30     otb otb 2x10   TB supination otb 2x10 np np      otb otb 20x   SKTC   nv          LTR   nv          Sciatic nerve glides   nv              Modalities                                                         Assessment: Tolerated treatment well    Patient demonstrated fatigue post treatment, exhibited good technique with therapeutic exercises and would benefit from continued PT  Received PT referral for LLE pain; RE performed this visit  Add exercises NV as tolerable as listed above  Plan: Continue per plan of care

## 2018-12-07 ENCOUNTER — OFFICE VISIT (OUTPATIENT)
Dept: PHYSICAL THERAPY | Facility: CLINIC | Age: 78
End: 2018-12-07
Payer: MEDICARE

## 2018-12-07 DIAGNOSIS — S46.211D RUPTURE OF RIGHT DISTAL BICEPS TENDON, SUBSEQUENT ENCOUNTER: Primary | ICD-10-CM

## 2018-12-07 DIAGNOSIS — M25.562 ACUTE PAIN OF BOTH KNEES: ICD-10-CM

## 2018-12-07 DIAGNOSIS — M25.561 ACUTE PAIN OF BOTH KNEES: ICD-10-CM

## 2018-12-07 DIAGNOSIS — M54.32 SCIATICA OF LEFT SIDE: ICD-10-CM

## 2018-12-07 PROCEDURE — 97140 MANUAL THERAPY 1/> REGIONS: CPT | Performed by: PHYSICAL THERAPIST

## 2018-12-07 PROCEDURE — 97110 THERAPEUTIC EXERCISES: CPT | Performed by: PHYSICAL THERAPIST

## 2018-12-07 NOTE — PROGRESS NOTES
Daily Note     Today's date: 2018  Patient name: Yumiko Haider  : 1940  MRN: [id number]  Referring provider: Virgil Elliott DO  Dx:   Encounter Diagnosis     ICD-10-CM    1  Rupture of right distal biceps tendon, subsequent encounter S46 211D    2  Acute pain of both knees M25 561     M25 562    3  Sciatica of left side M54 32                   Subjective: Pt offered no complaints of pain in R UE prior to beginning today  She reported less pain in L LE and compliance with HEP  Objective: See treatment diary below    Precautions: thyroid disorder, migraines     Daily Treatment Diary     Manual      Passive elbow ROM   np perf  TE perf  TE perf  TE perf  TE perf  TE  nv   Passive sup/pron ROM   np perf  TE perf  TE perf  TE perf  TE perf  TE  nv   Lumbar roll   nv perf CHANDLER                                       Exercise Diary     Pulleys 5' 5' 5' np 5' 5' 5' 5' 5' 5'   UBE    2' ea         Active elbow curl 3-way 3# 2x10 3#  3x10 3#  3x10 30 ea 30 ea 30 ea 1# x10 1#nv  30 3# x10 3# 2x10   Active forearm sup/pronation 2# 2x10 3# 3x10 3# 3x10 4#   1x10 30 ea 30 ea 1# x10 1#nv  30 3# x10 2# 2x10                Elbow flex isometric D/C -- ---  5"x10 5"x10       Elbow extension eccentric 5# 2x10 5# 3x10 5# 3x10 5# AROM 3x10     5# x10 5# 2x10   Shoulder flex   AROM 2# 2x10 2#    2#  2x10 2#  3x10 2# 2x10 2# 2x10 2# 2x10 2# 2x10 3#  nv 3# 2x10   Scap retraction otb 3x10 otb 3x10 3"x30 otb  3"x30 3"x30 3"x30 otb 3"x15 otb nv  3x10 otb 3x10 otb 3x10                TB bicep curl otb 2x10 np otb x30 otb   x30     otb otb 2x10   TB supination otb 2x10 np np      otb otb 20x   SKTC   nv 15" x5 ea         LTR   nv 15" x5 ea         Sciatic nerve glides   nv 30 pump x3             Modalities                                                         Assessment: Tolerated treatment well    Patient demonstrated fatigue post treatment, exhibited good technique with therapeutic exercises and would benefit from continued PT  Pt reported decrease in symptoms at end of session  Plan: Continue per plan of care

## 2018-12-11 ENCOUNTER — OFFICE VISIT (OUTPATIENT)
Dept: PHYSICAL THERAPY | Facility: CLINIC | Age: 78
End: 2018-12-11
Payer: MEDICARE

## 2018-12-11 DIAGNOSIS — M25.562 ACUTE PAIN OF BOTH KNEES: ICD-10-CM

## 2018-12-11 DIAGNOSIS — M25.561 ACUTE PAIN OF BOTH KNEES: ICD-10-CM

## 2018-12-11 DIAGNOSIS — S46.211D RUPTURE OF RIGHT DISTAL BICEPS TENDON, SUBSEQUENT ENCOUNTER: Primary | ICD-10-CM

## 2018-12-11 DIAGNOSIS — M54.32 SCIATICA OF LEFT SIDE: ICD-10-CM

## 2018-12-11 PROCEDURE — 97110 THERAPEUTIC EXERCISES: CPT

## 2018-12-11 NOTE — PROGRESS NOTES
Daily Note     Today's date: 2018  Patient name: Gauri Mattson  : 1940  MRN: [id number]  Referring provider: Sina Sue DO  Dx:   Encounter Diagnosis     ICD-10-CM    1  Rupture of right distal biceps tendon, subsequent encounter S46 211D    2  Acute pain of both knees M25 561     M25 562    3  Sciatica of left side M54 32                   Subjective: Pt offered no complaints of pain in R UE prior to beginning today  She reported experiencing increased pain in L LE in posterior thigh, but denied change in activity      Objective: See treatment diary below    Precautions: thyroid disorder, migraines     Daily Treatment Diary     Manual      Passive elbow ROM   np perf  TE  perf  TE perf  TE perf  TE  nv   Passive sup/pron ROM   np perf  TE  perf  TE perf  TE perf  TE  nv   Lumbar roll   nv perf CHANDLER                                       Exercise Diary     Pulleys 5' 5' 5' np D/C 5' 5' 5' 5' 5'   UBE    2' ea 3' ea        Active elbow curl 3-way 3# 2x10 3#  3x10 3#  3x10 30 ea 4# 2x10 30 ea 1# x10 1#nv  30 3# x10 3# 2x10   Active forearm sup/pronation 2# 2x10 3# 3x10 3# 3x10 4#   1x10 4# 2x10 30 ea 1# x10 1#nv  30 3# x10 2# 2x10                Elbow flex isometric D/C -- ---   5"x10       Elbow extension eccentric 5# 2x10 5# 3x10 5# 3x10 5# AROM 3x10 5# AROM 3x10    5# x10 5# 2x10   Shoulder flex   AROM 2# 2x10 2#    2#  2x10 2#  3x10 2#  3x10 2# 2x10 2# 2x10 2# 2x10 3#  nv 3# 2x10   Scap retraction otb 3x10 otb 3x10 3"x30 otb  3"x30 gtb 3"x20 3"x30 otb 3"x15 otb nv  3x10 otb 3x10 otb 3x10                TB bicep curl otb 2x10 np otb x30 otb   x30 gtb  x20    otb otb 2x10   TB supination otb 2x10 np np      otb otb 20x   SKTC   nv 15" x5 ea 15" x5 ea        LTR   nv 15" x5 ea 15" x5 ea        Sciatic nerve glides   nv 30 pump x3 30 pump x3            Modalities Assessment: Tolerated treatment well  Patient demonstrated fatigue post treatment, exhibited good technique with therapeutic exercises and would benefit from continued PT  Pt reported decrease in symptoms at end of session  Plan: Continue per plan of care

## 2018-12-13 ENCOUNTER — OFFICE VISIT (OUTPATIENT)
Dept: PHYSICAL THERAPY | Facility: CLINIC | Age: 78
End: 2018-12-13
Payer: MEDICARE

## 2018-12-13 DIAGNOSIS — M54.32 SCIATICA OF LEFT SIDE: ICD-10-CM

## 2018-12-13 DIAGNOSIS — M25.561 ACUTE PAIN OF BOTH KNEES: ICD-10-CM

## 2018-12-13 DIAGNOSIS — M25.562 ACUTE PAIN OF BOTH KNEES: ICD-10-CM

## 2018-12-13 DIAGNOSIS — S46.211D RUPTURE OF RIGHT DISTAL BICEPS TENDON, SUBSEQUENT ENCOUNTER: Primary | ICD-10-CM

## 2018-12-13 PROCEDURE — 97110 THERAPEUTIC EXERCISES: CPT | Performed by: PHYSICAL THERAPIST

## 2018-12-13 PROCEDURE — 97140 MANUAL THERAPY 1/> REGIONS: CPT | Performed by: PHYSICAL THERAPIST

## 2018-12-13 NOTE — PROGRESS NOTES
Daily Note     Today's date: 2018  Patient name: Yumiko Haider  : 1940  MRN: [id number]  Referring provider: Virgil Elliott DO  Dx:   Encounter Diagnosis     ICD-10-CM    1  Rupture of right distal biceps tendon, subsequent encounter S46 211D    2  Acute pain of both knees M25 561     M25 562    3  Sciatica of left side M54 32                   Subjective: Pt comes to therapy reporting her LEs have been feeling better since starting therapy for LE pain, however, notes she had episode of cramping and discomfort last night (10:30 PM to 12:30 AM) for unknown reason  Denies any changes in activity levels that might have caused cramping, noting she had a relatively relaxing day  States cramping occurred in posterior knees, denying previous episodes of cramping  Denies arm pain, noting right arm has been feeling better  Notes she has a call in to her pain management physician asking for appointment as soon as possible  Comes to therapy reporting continued soreness in knees as a residual from cramping last night       Objective: See treatment diary below    Precautions: thyroid disorder, migraines     Daily Treatment Diary     Manual      Passive elbow ROM   np perf  TE   perf  TE perf  TE  nv   Passive sup/pron ROM   np perf  TE   perf  TE perf  TE  nv   Lumbar roll   nv perf CHANDLER  CHANDLER       B hamstring str      CHANDLER                        Exercise Diary     Pulleys 5' 5' 5' np D/C D/C 5' 5' 5' 5'   UBE    2' ea 3' ea 3' ea       Active elbow curl 3-way 3# 2x10 3#  3x10 3#  3x10 30 ea 4# 2x10 4# x30 ea 1# x10 1#nv  30 3# x10 3# 2x10   Active forearm sup/pronation 2# 2x10 3# 3x10 3# 3x10 4#   1x10 4# 2x10 4# x30 ea 1# x10 1#nv  30 3# x10 2# 2x10                Elbow flex isometric D/C -- ---          Elbow extension eccentric 5# 2x10 5# 3x10 5# 3x10 5# AROM 3x10 5# AROM 3x10 D/C   5# x10 5# 2x10 Shoulder flex   AROM 2# 2x10 2#    2#  2x10 2#  3x10 2#  3x10 np 2# 2x10 2# 2x10 3#  nv 3# 2x10   Scap retraction otb 3x10 otb 3x10 3"x30 otb  3"x30 gtb 3"x20 np otb 3"x15 otb nv  3x10 otb 3x10 otb 3x10                TB bicep curl otb 2x10 np otb x30 otb   x30 gtb  x20 np   otb otb 2x10   TB supination otb 2x10 np np      otb otb 20x   SKTC   nv 15" x5 ea 15" x5 ea 15" x5 ea       LTR   nv 15" x5 ea 15" x5 ea 15" x5 ea       Sciatic nerve glides   nv 30 pump x3 30 pump x3 30 pump x3           Modalities                                                           Assessment: Tolerated treatment well  Patient demonstrated fatigue post treatment, exhibited good technique with therapeutic exercises and would benefit from continued PT  Pt reported decrease in symptoms at end of session  Plan: Continue per plan of care

## 2018-12-14 ENCOUNTER — APPOINTMENT (OUTPATIENT)
Dept: PHYSICAL THERAPY | Facility: CLINIC | Age: 78
End: 2018-12-14
Payer: MEDICARE

## 2018-12-18 ENCOUNTER — OFFICE VISIT (OUTPATIENT)
Dept: PHYSICAL THERAPY | Facility: CLINIC | Age: 78
End: 2018-12-18
Payer: MEDICARE

## 2018-12-18 DIAGNOSIS — S46.211D RUPTURE OF RIGHT DISTAL BICEPS TENDON, SUBSEQUENT ENCOUNTER: Primary | ICD-10-CM

## 2018-12-18 DIAGNOSIS — M25.561 ACUTE PAIN OF BOTH KNEES: ICD-10-CM

## 2018-12-18 DIAGNOSIS — M54.32 SCIATICA OF LEFT SIDE: ICD-10-CM

## 2018-12-18 DIAGNOSIS — M25.562 ACUTE PAIN OF BOTH KNEES: ICD-10-CM

## 2018-12-18 PROCEDURE — 97140 MANUAL THERAPY 1/> REGIONS: CPT

## 2018-12-18 PROCEDURE — 97110 THERAPEUTIC EXERCISES: CPT

## 2018-12-18 NOTE — PROGRESS NOTES
Daily Note     Today's date: 2018  Patient name: Nawaf Ortega  : 1940  MRN: [id number]  Referring provider: Carlos Diop DO  Dx:   Encounter Diagnosis     ICD-10-CM    1  Rupture of right distal biceps tendon, subsequent encounter S46 211D    2  Acute pain of both knees M25 561     M25 562    3  Sciatica of left side M54 32                   Subjective: Pt presents to PT with no c/o pain in R UE/bicep region  She denied soreness/pain following last visit  She reported continued pain in L LB with pain radiating into thigh  She further noted constant numbness in B feet      Objective: See treatment diary below    Precautions: thyroid disorder, migraines     Daily Treatment Diary     Manual      Passive elbow ROM   np perf  TE    perf  TE  nv   Passive sup/pron ROM   np perf  TE    perf  TE  nv   Lumbar roll   nv perf CHANDLER  CHANDLER TE      B hamstring str      CHANDLER TE                       Exercise Diary        Pulleys 5' 5' 5' np D/C D/C 5'      UBE    2' ea 3' ea 3' ea nv      Active elbow curl 3-way 3# 2x10 3#  3x10 3#  3x10 30 ea 4# 2x10 4# x30 ea 4# x30 ea      Active forearm sup/pronation 2# 2x10 3# 3x10 3# 3x10 4#   1x10 4# 2x10 4# x30 ea 4# x30 ea                   Elbow flex isometric D/C -- ---          Elbow extension eccentric 5# 2x10 5# 3x10 5# 3x10 5# AROM 3x10 5# AROM 3x10 D/C ---      Shoulder flex   AROM 2# 2x10 2#    2#  2x10 2#  3x10 2#  3x10 np 2# 3x10      Scap retraction otb 3x10 otb 3x10 3"x30 otb  3"x30 gtb 3"x20 np otb 3"x20                   TB bicep curl otb 2x10 np otb x30 otb   x30 gtb  x20 np gtb  x20      TB supination otb 2x10 np np          SKTC   nv 15" x5 ea 15" x5 ea 15" x5 ea 15" x5 ea      LTR   nv 15" x5 ea 15" x5 ea 15" x5 ea 15" x5 ea      Sciatic nerve glides   nv 30 pump x3 30 pump x3 30 pump x3 30 pump x3          Modalities Assessment: Tolerated treatment well  Patient demonstrated fatigue post treatment, exhibited good technique with therapeutic exercises and would benefit from continued PT  Pt reported decrease in symptoms at end of session  Plan: Continue per plan of care

## 2018-12-21 ENCOUNTER — OFFICE VISIT (OUTPATIENT)
Dept: PHYSICAL THERAPY | Facility: CLINIC | Age: 78
End: 2018-12-21
Payer: MEDICARE

## 2018-12-21 DIAGNOSIS — S46.211D RUPTURE OF RIGHT DISTAL BICEPS TENDON, SUBSEQUENT ENCOUNTER: Primary | ICD-10-CM

## 2018-12-21 DIAGNOSIS — M54.32 SCIATICA OF LEFT SIDE: ICD-10-CM

## 2018-12-21 DIAGNOSIS — M25.561 ACUTE PAIN OF BOTH KNEES: ICD-10-CM

## 2018-12-21 DIAGNOSIS — M25.562 ACUTE PAIN OF BOTH KNEES: ICD-10-CM

## 2018-12-21 PROCEDURE — 97140 MANUAL THERAPY 1/> REGIONS: CPT

## 2018-12-21 PROCEDURE — 97110 THERAPEUTIC EXERCISES: CPT

## 2018-12-21 NOTE — PROGRESS NOTES
Daily Note     Today's date: 2018  Patient name: Rosa aCrey  : 1940  MRN: [id number]  Referring provider: Lashell Haines DO  Dx:   Encounter Diagnosis     ICD-10-CM    1  Rupture of right distal biceps tendon, subsequent encounter S46 211D    2  Acute pain of both knees M25 561     M25 562    3  Sciatica of left side M54 32                   Subjective: Pt presents to PT with no c/o pain in R UE/bicep region  She denied soreness/pain following last visit  She reported continued pain in L LB with pain radiating into thigh which caused difficulty sleeping last night      Objective: See treatment diary below    Precautions: thyroid disorder, migraines     Daily Treatment Diary     Manual      Passive elbow ROM   np perf  TE    perf  TE  nv   Passive sup/pron ROM   np perf  TE    perf  TE  nv   Lumbar roll   nv perf CHANDLER  CHANDLER TE TE     B hamstring str      CHANDLER TE TE                      Exercise Diary       Pulleys 5' 5' 5' np D/C D/C 5' ---     UBE    2' ea 3' ea 3' ea nv 3' ea     Active elbow curl 3-way 3# 2x10 3#  3x10 3#  3x10 30 ea 4# 2x10 4# x30 ea 4# x30 ea 4# x30 ea     Active forearm sup/pronation 2# 2x10 3# 3x10 3# 3x10 4#   1x10 4# 2x10 4# x30 ea 4# x30 ea 4# x30 ea                  Elbow flex isometric D/C -- ---          Elbow extension eccentric 5# 2x10 5# 3x10 5# 3x10 5# AROM 3x10 5# AROM 3x10 D/C ---      Shoulder flex   AROM 2# 2x10 2#    2#  2x10 2#  3x10 2#  3x10 np 2# 3x10 2# 3x10     Scap retraction otb 3x10 otb 3x10 3"x30 otb  3"x30 gtb 3"x20 np otb 3"x20 gtb  3"x30                  TB bicep curl otb 2x10 np otb x30 otb   x30 gtb  x20 np gtb  x20 gtb x30     TB supination otb 2x10 np np          SKTC   nv 15" x5 ea 15" x5 ea 15" x5 ea 15" x5 ea 15" x5 ea     LTR   nv 15" x5 ea 15" x5 ea 15" x5 ea 15" x5 ea 15" x5 ea     Sciatic nerve glides   nv 30 pump x3 30 pump x3 30 pump x3 30 pump x3 30 pump x3         Modalities                                                           Assessment: Tolerated treatment well  Patient demonstrated fatigue post treatment, exhibited good technique with therapeutic exercises and would benefit from continued PT  Pt reported decrease in symptoms at end of session  Plan: Continue per plan of care

## 2018-12-24 ENCOUNTER — OFFICE VISIT (OUTPATIENT)
Dept: PHYSICAL THERAPY | Facility: CLINIC | Age: 78
End: 2018-12-24
Payer: MEDICARE

## 2018-12-24 DIAGNOSIS — M25.561 ACUTE PAIN OF BOTH KNEES: ICD-10-CM

## 2018-12-24 DIAGNOSIS — M54.32 SCIATICA OF LEFT SIDE: ICD-10-CM

## 2018-12-24 DIAGNOSIS — S46.211D RUPTURE OF RIGHT DISTAL BICEPS TENDON, SUBSEQUENT ENCOUNTER: Primary | ICD-10-CM

## 2018-12-24 DIAGNOSIS — M25.562 ACUTE PAIN OF BOTH KNEES: ICD-10-CM

## 2018-12-24 PROCEDURE — 97140 MANUAL THERAPY 1/> REGIONS: CPT | Performed by: PHYSICAL THERAPIST

## 2018-12-24 PROCEDURE — G8990 OTHER PT/OT CURRENT STATUS: HCPCS | Performed by: PHYSICAL THERAPIST

## 2018-12-24 PROCEDURE — G8991 OTHER PT/OT GOAL STATUS: HCPCS | Performed by: PHYSICAL THERAPIST

## 2018-12-24 PROCEDURE — 97110 THERAPEUTIC EXERCISES: CPT | Performed by: PHYSICAL THERAPIST

## 2018-12-24 NOTE — PROGRESS NOTES
Daily Note     Today's date: 2018  Patient name: Yann Wahl  : 1940  MRN: [id number]  Referring provider: Cande Restrepo DO  Dx:   Encounter Diagnosis     ICD-10-CM    1  Rupture of right distal biceps tendon, subsequent encounter S46 211D    2  Acute pain of both knees M25 561     M25 562    3  Sciatica of left side M54 32                   Subjective: Pt comes to therapy reporting minimal to no pain in her right UE, stating her elbow has been feeling good  States her primary complaint at present time is pain down posterior LLE  States she has appointment with orthopedic spine specialist on 18, where she is hoping to get an injection, which typically helps to relief symptoms       Objective: See treatment diary below    Precautions: thyroid disorder, migraines     Daily Treatment Diary     Manual      Passive elbow ROM   np perf  TE    perf  TE  nv   Passive sup/pron ROM   np perf  TE    perf  TE  nv   Lumbar roll   nv perf CHANDLER  CHANDLER TE TE     B hamstring str      CHANDLER TE TE                      Exercise Diary      Pulleys 5' 5' 5' np D/C D/C 5' ---     UBE    2' ea 3' ea 3' ea nv 3' ea              5 min    Active elbow curl 3-way 3# 2x10 3#  3x10 3#  3x10 30 ea 4# 2x10 4# x30 ea 4# x30 ea 4# x30 ea np    Active forearm sup/pronation 2# 2x10 3# 3x10 3# 3x10 4#   1x10 4# 2x10 4# x30 ea 4# x30 ea 4# x30 ea np                 Elbow flex isometric D/C -- ---          Elbow extension eccentric 5# 2x10 5# 3x10 5# 3x10 5# AROM 3x10 5# AROM 3x10 D/C ---      Shoulder flex   AROM 2# 2x10 2#    2#  2x10 2#  3x10 2#  3x10 np 2# 3x10 2# 3x10     Scap retraction otb 3x10 otb 3x10 3"x30 otb  3"x30 gtb 3"x20 np otb 3"x20 gtb  3"x30                  TB bicep curl otb 2x10 np otb x30 otb   x30 gtb  x20 np gtb  x20 gtb x30     TB supination otb 2x10 np np          SKTC   nv 15" x5 ea 15" x5 ea 15" x5 ea 15" x5 ea 15" x5 ea 15" x5 ea    LTR   nv 15" x5 ea 15" x5 ea 15" x5 ea 15" x5 ea 15" x5 ea 15" x5 ea    Sciatic nerve glides   nv 30 pump x3 30 pump x3 30 pump x3 30 pump x3 30 pump x3 30 pump x3    Lumbar roll outs         15"x5        Modalities                                                           Assessment: Tolerated treatment well  Patient demonstrated fatigue post treatment, exhibited good technique with therapeutic exercises and would benefit from continued PT  Denies LLE symptoms at end of session  Plan: Continue per plan of care

## 2018-12-28 ENCOUNTER — OFFICE VISIT (OUTPATIENT)
Dept: PHYSICAL THERAPY | Facility: CLINIC | Age: 78
End: 2018-12-28
Payer: MEDICARE

## 2018-12-28 DIAGNOSIS — M25.562 ACUTE PAIN OF BOTH KNEES: ICD-10-CM

## 2018-12-28 DIAGNOSIS — M25.561 ACUTE PAIN OF BOTH KNEES: ICD-10-CM

## 2018-12-28 DIAGNOSIS — S46.211D RUPTURE OF RIGHT DISTAL BICEPS TENDON, SUBSEQUENT ENCOUNTER: Primary | ICD-10-CM

## 2018-12-28 DIAGNOSIS — M54.32 SCIATICA OF LEFT SIDE: ICD-10-CM

## 2018-12-28 PROCEDURE — 97110 THERAPEUTIC EXERCISES: CPT

## 2018-12-28 PROCEDURE — G8990 OTHER PT/OT CURRENT STATUS: HCPCS

## 2018-12-28 PROCEDURE — G8991 OTHER PT/OT GOAL STATUS: HCPCS

## 2018-12-28 NOTE — PROGRESS NOTES
Daily Note     Today's date: 2018  Patient name: Talia Thomas  : 1940  MRN: [id number]  Referring provider: Candi Vernon DO  Dx:   Encounter Diagnosis     ICD-10-CM    1  Rupture of right distal biceps tendon, subsequent encounter S46 211D    2  Acute pain of both knees M25 561     M25 562    3  Sciatica of left side M54 32               Subjective: Pt would like to hold on treatment for knee and sciatic pain until she sees her doctor for a possible injection next week  She denies pain in her arm upon arrival and throughout session      FOTO 18: 83  FOTO             Re-eval      Objective: See treatment diary below    Precautions: thyroid disorder, migraines    Daily Treatment Diary  Manual       Passive elbow ROM   np perf  TE         Passive sup/pron ROM   np perf  TE         Lumbar roll   nv perf CHANDLER  CHANDLER TE      B hamstring str      Cayla Plate CHANDLER ---                  Exercise Diary       Pulleys   5' np D/C D/C 5' ---  ---   Ed Zuly    2' ea 3' ea 3' ea nv 3' ea  3' ea   bike         5 min    Active elbow curl 3-way   3#  3x10 30 ea 4# 2x10 4# x30 ea 4# x30 ea 4# x30 ea np 4#   30x ea   Active forearm sup/pronation   3# 3x10 4#   1x10 4# 2x10 4# x30 ea 4# x30 ea 4# x30 ea np 4#   30x ea   Elbow extension eccentric   5# 3x10 5# AROM 3x10 5# AROM 3x10 D/C --- --- --- ---   Shoulder flex   AROM   2#  2x10 2#  3x10 2#  3x10 np 2# 3x10 2# 3x10  2# b/l 3x10    Scap retraction   3"x30 otb  3"x30 gtb 3"x20 np otb 3"x20 gtb  3"x30  GTB 5"x30   TB bicep curl   otb x30 otb   x30 gtb  x20 np gtb  x20 gtb x30  GTB 30x b/l   TB supination   np       ---   SKTC   nv 15" x5 ea 15" x5 ea 15" x5 ea 15" x5 ea 15" x5 ea 15" x5 ea hold   LTR   nv 15" x5 ea 15" x5 ea 15" x5 ea 15" x5 ea 15" x5 ea 15" x5 ea hold   Sciatic nerve glides   nv 30 pump x3 30 pump x3 30 pump x3 30 pump x3 30 pump x3 30 pump x3 hold   Lumbar roll outs         15"x5 hold     Assessment: Tolerated treatment well  Patient demonstrated fatigue post treatment, exhibited good technique with therapeutic exercises and would benefit from continued PT to address continued deficits in R arm, B knees, and LLE  Plan: Continue per plan of care  Progress treatment as tolerated        Shauna Lanier, ALEJANDRO

## 2019-01-08 ENCOUNTER — TELEPHONE (OUTPATIENT)
Dept: PHYSICAL THERAPY | Facility: CLINIC | Age: 79
End: 2019-01-08

## 2019-01-08 NOTE — PROGRESS NOTES
01/08/2019: Spoke with patient on phone  States she wishes to put therapy on hold at present time to see how her back responds to injections  Will call back should she or physician decide to resume therapy

## 2019-01-10 DIAGNOSIS — E03.9 ADULT HYPOTHYROIDISM: ICD-10-CM

## 2019-01-10 RX ORDER — LEVOTHYROXINE SODIUM 0.05 MG/1
TABLET ORAL
Qty: 90 TABLET | Refills: 1 | Status: SHIPPED | OUTPATIENT
Start: 2019-01-10 | End: 2019-08-06 | Stop reason: SDUPTHER

## 2019-01-11 DIAGNOSIS — E78.49 OTHER HYPERLIPIDEMIA: Primary | ICD-10-CM

## 2019-01-11 RX ORDER — PRAVASTATIN SODIUM 20 MG
20 TABLET ORAL DAILY
Qty: 90 TABLET | Refills: 3 | Status: SHIPPED | OUTPATIENT
Start: 2019-01-11 | End: 2020-02-28

## 2019-01-15 ENCOUNTER — HOSPITAL ENCOUNTER (OUTPATIENT)
Dept: MAMMOGRAPHY | Facility: MEDICAL CENTER | Age: 79
Discharge: HOME | End: 2019-01-15
Payer: MEDICARE

## 2019-01-15 ENCOUNTER — HOSPITAL ENCOUNTER (OUTPATIENT)
Dept: BONE DENSITY | Facility: MEDICAL CENTER | Age: 79
Discharge: HOME | End: 2019-01-15
Payer: MEDICARE

## 2019-01-15 VITALS — HEIGHT: 60 IN | WEIGHT: 174 LBS | BODY MASS INDEX: 34.16 KG/M2

## 2019-01-15 DIAGNOSIS — M89.9 DISORDER OF BONE: ICD-10-CM

## 2019-01-15 DIAGNOSIS — Z12.39 SCREENING FOR MALIGNANT NEOPLASM OF BREAST: ICD-10-CM

## 2019-01-15 DIAGNOSIS — Z13.820 SCREENING FOR OSTEOPOROSIS: ICD-10-CM

## 2019-01-15 PROCEDURE — 77063 BREAST TOMOSYNTHESIS BI: CPT

## 2019-01-15 PROCEDURE — 77080 DXA BONE DENSITY AXIAL: CPT

## 2019-01-15 PROCEDURE — 77067 SCR MAMMO BI INCL CAD: CPT

## 2019-01-18 ENCOUNTER — OFFICE VISIT (OUTPATIENT)
Dept: FAMILY MEDICINE CLINIC | Facility: CLINIC | Age: 79
End: 2019-01-18
Payer: MEDICARE

## 2019-01-18 VITALS
SYSTOLIC BLOOD PRESSURE: 142 MMHG | HEART RATE: 75 BPM | BODY MASS INDEX: 34.16 KG/M2 | TEMPERATURE: 98.6 F | OXYGEN SATURATION: 97 % | WEIGHT: 174 LBS | HEIGHT: 60 IN | DIASTOLIC BLOOD PRESSURE: 86 MMHG

## 2019-01-18 DIAGNOSIS — J06.9 ACUTE URI: Primary | ICD-10-CM

## 2019-01-18 PROCEDURE — 99213 OFFICE O/P EST LOW 20 MIN: CPT | Performed by: NURSE PRACTITIONER

## 2019-01-18 PROCEDURE — 94640 AIRWAY INHALATION TREATMENT: CPT | Performed by: NURSE PRACTITIONER

## 2019-01-18 RX ORDER — AZITHROMYCIN 250 MG/1
TABLET, FILM COATED ORAL
Qty: 6 TABLET | Refills: 0 | Status: SHIPPED | OUTPATIENT
Start: 2019-01-18 | End: 2019-01-22

## 2019-01-18 RX ORDER — AZITHROMYCIN 250 MG/1
TABLET, FILM COATED ORAL
Qty: 6 TABLET | Refills: 0 | Status: SHIPPED | OUTPATIENT
Start: 2019-01-18 | End: 2019-01-18 | Stop reason: SDUPTHER

## 2019-01-18 RX ORDER — BENZONATATE 100 MG/1
100 CAPSULE ORAL 3 TIMES DAILY PRN
Qty: 20 CAPSULE | Refills: 0 | Status: SHIPPED | OUTPATIENT
Start: 2019-01-18 | End: 2019-01-18 | Stop reason: SDUPTHER

## 2019-01-18 RX ORDER — BENZONATATE 100 MG/1
100 CAPSULE ORAL 3 TIMES DAILY PRN
Qty: 20 CAPSULE | Refills: 0 | Status: SHIPPED | OUTPATIENT
Start: 2019-01-18 | End: 2019-02-21 | Stop reason: ALTCHOICE

## 2019-01-18 NOTE — PROGRESS NOTES
Assessment/Plan:    Acute URI   Start azithromycin, this is the antibiotic, This is 2 pills on day one and then 1 pill for the following 4 days  Start cough medication, this is the Tessalon perles  One pill every 8 hours as needed for cough  Increase fluids and rest    Use ventolin inhaler 2 puffs every 6 hours as needed  Next time you would be due is 5:40pm    Please call the office if you are experiencing any worsening of symptoms or no symptom improvement  Diagnoses and all orders for this visit:    Acute URI  -     Discontinue: azithromycin (ZITHROMAX) 250 mg tablet; Take 2 tablets today then 1 tablet daily x 4 days  -     Discontinue: benzonatate (TESSALON PERLES) 100 mg capsule; Take 1 capsule (100 mg total) by mouth 3 (three) times a day as needed for cough  -     azithromycin (ZITHROMAX) 250 mg tablet; Take 2 tablets today then 1 tablet daily x 4 days  -     benzonatate (TESSALON PERLES) 100 mg capsule; Take 1 capsule (100 mg total) by mouth 3 (three) times a day as needed for cough    Other orders  -     Mini neb      Patient verbalizes understand and agrees with treatment plan  Subjective:        Patient ID: Nawaf Ortega is a 77 y o  female  Chief Complaint   Patient presents with    Sore Throat     with cough and yellow colored phlegm, nasal congestion  no fever or chill indicated today  symptoms started 1 week ago       Patient presents to office today for evaluation cold symptoms  Patient states that she has had a productive cough for greater than 1 week  She has been using cough drops and increasing fluids  Patient states cough drops or helping a little bit  She states she does feel like she is wheezing and she does get short of breath on exertion  She is having some nasal discharge as well            The following portions of the patient's history were reviewed and updated as appropriate: allergies, current medications, past family history, past social history and problem list     Review of Systems   Constitutional: Positive for chills and fever (unsure of temperature but was all sweaty)  HENT: Positive for congestion, rhinorrhea and sore throat  Negative for sinus pain and sinus pressure  Eyes: Negative for pain and visual disturbance  Respiratory: Positive for cough, choking and wheezing  Negative for shortness of breath  Cardiovascular: Negative for chest pain, palpitations and leg swelling  Gastrointestinal: Negative for abdominal pain, diarrhea, nausea and vomiting  Genitourinary: Negative for difficulty urinating and dysuria  Stress incontinence   Musculoskeletal: Negative for arthralgias and myalgias  Skin: Negative for color change and rash  Neurological: Negative for dizziness, syncope, numbness and headaches  Hematological: Positive for adenopathy  Psychiatric/Behavioral: Negative for agitation and behavioral problems  The patient is not nervous/anxious  Objective:  /86 (BP Location: Left arm, Patient Position: Sitting, Cuff Size: Large)   Pulse 75   Temp 98 6 °F (37 °C) (Tympanic)   Ht 5' (1 524 m)   Wt 78 9 kg (174 lb)   SpO2 97%   BMI 33 98 kg/m²   Mini neb  Performed by: Bladimir Child  Authorized by: Danie Krueger A     Number of treatments:  1  Treatment 1:   Pre-Procedure     Symptoms:  Wheezing, shortness of breath and cough    Lung Sounds:  Rhonchi, wheezing     Medication Administered:  Albuterol 2 5 mg  Post-Procedure     Symptoms:  Cough    Lung sounds:  Mild rhonchi    HR:  75    SP02:  97       Physical Exam   Constitutional: She is oriented to person, place, and time  She appears well-developed  No distress  obese   HENT:   Head: Normocephalic and atraumatic  Right Ear: External ear normal  No drainage  No decreased hearing is noted  Left Ear: External ear normal  No drainage, swelling or tenderness  Tympanic membrane is not perforated, not erythematous, not retracted and not bulging  No decreased hearing is noted  Nose: Rhinorrhea present  Right sinus exhibits no maxillary sinus tenderness and no frontal sinus tenderness  Left sinus exhibits no maxillary sinus tenderness and no frontal sinus tenderness  Mouth/Throat: Posterior oropharyngeal erythema present  No oropharyngeal exudate or posterior oropharyngeal edema  Right impacted cerumen   Eyes: Conjunctivae and lids are normal  Right eye exhibits no discharge  Left eye exhibits no discharge  Neck: Normal range of motion  Neck supple  No tracheal deviation present  Cardiovascular: Normal rate and regular rhythm  No murmur heard  Pulmonary/Chest: Effort normal  No respiratory distress  She has wheezes  She has rhonchi  Abdominal: Soft  Bowel sounds are normal  She exhibits no distension  There is no tenderness  There is no guarding  Musculoskeletal: She exhibits no edema or deformity  Lymphadenopathy:     She has no cervical adenopathy  Neurological: She is alert and oriented to person, place, and time  Coordination normal    Skin: Skin is warm and dry  No rash noted  She is not diaphoretic  No erythema  Psychiatric: She has a normal mood and affect  Her speech is normal and behavior is normal  Judgment and thought content normal  Cognition and memory are normal    Nursing note and vitals reviewed

## 2019-01-18 NOTE — PATIENT INSTRUCTIONS
Start azithromycin, this is the antibiotic, This is 2 pills on day one and then 1 pill for the following 4 days  Start cough medication, this is the Tessalon perles  One pill every 8 hours as needed for cough  Increase fluids and rest    Use ventolin inhaler 2 puffs every 6 hours as needed  Next time you would be due is 5:40pm    Please call the office if you are experiencing any worsening of symptoms or no symptom improvement  Upper Respiratory Infection   AMBULATORY CARE:   An upper respiratory infection  is also called a common cold  It can affect your nose, throat, ears, and sinuses  Common signs and symptoms include the following:  Cold symptoms are usually worst for the first 3 to 5 days  You may have any of the following:  · Runny or stuffy nose    · Sneezing and coughing    · Sore throat or hoarseness    · Red, watery, and sore eyes    · Fatigue     · Chills and fever    · Headache, body aches, or sore muscles  Seek care immediately if:   · You have chest pain or trouble breathing  Contact your healthcare provider if:   · You have a fever over 102ºF (39°C)  · Your sore throat gets worse or you see white or yellow spots in your throat  · Your symptoms get worse after 3 to 5 days or your cold is not better in 14 days  · You have a rash anywhere on your skin  · You have large, tender lumps in your neck  · You have thick, green or yellow drainage from your nose  · You cough up thick yellow, green, or bloody mucus  · You have vomiting for more than 24 hours and cannot keep fluids down  · You have a bad earache  · You have questions or concerns about your condition or care  Treatment for a cold: There is no cure for the common cold  Colds are caused by viruses and do not get better with antibiotics  Most people get better in 7 to 14 days  You may continue to cough for 2 to 3 weeks   The following may help decrease your symptoms:  · Decongestants  help reduce nasal congestion and help you breathe more easily  If you take decongestant pills, they may make you feel restless or not able to sleep  Do not use decongestant sprays for more than a few days  · Cough suppressants  help reduce coughing  Ask your healthcare provider which type of cough medicine is best for you  · NSAIDs , such as ibuprofen, help decrease swelling, pain, and fever  NSAIDs can cause stomach bleeding or kidney problems in certain people  If you take blood thinner medicine, always ask your healthcare provider if NSAIDs are safe for you  Always read the medicine label and follow directions  · Acetaminophen  decreases pain and fever  It is available without a doctor's order  Ask how much to take and how often to take it  Follow directions  Read the labels of all other medicines you are using to see if they also contain acetaminophen, or ask your doctor or pharmacist  Acetaminophen can cause liver damage if not taken correctly  Do not use more than 4 grams (4,000 milligrams) total of acetaminophen in one day  Manage your cold:   · Rest as much as possible  Slowly start to do more each day  · Drink more liquids as directed  Liquids will help thin and loosen mucus so you can cough it up  Liquids will also help prevent dehydration  Liquids that help prevent dehydration include water, fruit juice, and broth  Do not drink liquids that contain caffeine  Caffeine can increase your risk for dehydration  Ask your healthcare provider how much liquid to drink each day  · Soothe a sore throat  Gargle with warm salt water  This helps your sore throat feel better  Make salt water by dissolving ¼ teaspoon salt in 1 cup warm water  You may also suck on hard candy or throat lozenges  You may use a sore throat spray  · Use a humidifier or vaporizer  Use a cool mist humidifier or a vaporizer to increase air moisture in your home  This may make it easier for you to breathe and help decrease your cough  · Use saline nasal drops as directed  These help relieve congestion  · Apply petroleum-based jelly around the outside of your nostrils  This can decrease irritation from blowing your nose  · Do not smoke  Nicotine and other chemicals in cigarettes and cigars can make your symptoms worse  They can also cause infections such as bronchitis or pneumonia  Ask your healthcare provider for information if you currently smoke and need help to quit  E-cigarettes or smokeless tobacco still contain nicotine  Talk to your healthcare provider before you use these products  Prevent spreading your cold to others:   · Try to stay away from other people during the first 2 to 3 days of your cold when it is more easily spread  · Do not share food or drinks  · Do not share hand towels with household members  · Wash your hands often, especially after you blow your nose  Turn away from other people and cover your mouth and nose with a tissue when you sneeze or cough  Follow up with your healthcare provider as directed:  Write down your questions so you remember to ask them during your visits  © 2017 [facility] Information is for End User's use only and may not be sold, redistributed or otherwise used for commercial purposes  All illustrations and images included in CareNotes® are the copyrighted property of A D A M [employer] Inc  or [employer]bin [employer]  The above information is an  only  It is not intended as medical advice for individual conditions or treatments  Talk to your doctor, nurse or pharmacist before following any medical regimen to see if it is safe and effective for you

## 2019-01-25 ENCOUNTER — TELEPHONE (OUTPATIENT)
Dept: FAMILY MEDICINE CLINIC | Facility: CLINIC | Age: 79
End: 2019-01-25

## 2019-01-25 DIAGNOSIS — J06.9 UPPER RESPIRATORY TRACT INFECTION, UNSPECIFIED TYPE: Primary | ICD-10-CM

## 2019-01-25 RX ORDER — AZITHROMYCIN 250 MG/1
TABLET, FILM COATED ORAL
Qty: 6 TABLET | Refills: 0 | Status: SHIPPED | OUTPATIENT
Start: 2019-01-25 | End: 2019-01-29

## 2019-01-25 NOTE — TELEPHONE ENCOUNTER
Patient is concerned as she had a steroid shot in her spine 1/9/19 for her spinal stenosis and also had a steroid pack in September or October so she has concerns about taking more steroids  Is there anything else you can recommend?

## 2019-01-25 NOTE — TELEPHONE ENCOUNTER
Was seen on 1/18/19 and given a Z Lexa  She still has a loose cough with yellow mucus and also sinus drainage which is yellow    Asking if she needs any other treatment, thanks

## 2019-01-28 RX ORDER — ALBUTEROL SULFATE 2.5 MG/3ML
2.5 SOLUTION RESPIRATORY (INHALATION) ONCE
Status: DISCONTINUED | OUTPATIENT
Start: 2019-01-28 | End: 2019-08-27

## 2019-02-04 ENCOUNTER — LAB (OUTPATIENT)
Dept: LAB | Facility: CLINIC | Age: 79
End: 2019-02-04
Payer: MEDICARE

## 2019-02-04 DIAGNOSIS — E03.9 MYXEDEMA HEART DISEASE: ICD-10-CM

## 2019-02-04 DIAGNOSIS — R60.9 EDEMA, UNSPECIFIED TYPE: ICD-10-CM

## 2019-02-04 DIAGNOSIS — N18.30 CHRONIC KIDNEY DISEASE, STAGE III (MODERATE) (HCC): ICD-10-CM

## 2019-02-04 DIAGNOSIS — E03.9 ADULT HYPOTHYROIDISM: ICD-10-CM

## 2019-02-04 DIAGNOSIS — E66.9 OBESITY, UNSPECIFIED CLASSIFICATION, UNSPECIFIED OBESITY TYPE, UNSPECIFIED WHETHER SERIOUS COMORBIDITY PRESENT: ICD-10-CM

## 2019-02-04 DIAGNOSIS — I12.9 PARENCHYMAL RENAL HYPERTENSION, STAGE 1 THROUGH STAGE 4 OR UNSPECIFIED CHRONIC KIDNEY DISEASE: Primary | ICD-10-CM

## 2019-02-04 DIAGNOSIS — I51.9 MYXEDEMA HEART DISEASE: ICD-10-CM

## 2019-02-04 DIAGNOSIS — N18.2 STAGE 2 CHRONIC KIDNEY DISEASE: ICD-10-CM

## 2019-02-04 DIAGNOSIS — E78.5 HYPERLIPIDEMIA, UNSPECIFIED HYPERLIPIDEMIA TYPE: ICD-10-CM

## 2019-02-04 DIAGNOSIS — E55.9 AVITAMINOSIS D: ICD-10-CM

## 2019-02-04 LAB
25(OH)D3 SERPL-MCNC: 39.1 NG/ML (ref 30–100)
ALBUMIN SERPL BCP-MCNC: 3.6 G/DL (ref 3.5–5)
ALP SERPL-CCNC: 65 U/L (ref 46–116)
ALT SERPL W P-5'-P-CCNC: 26 U/L (ref 12–78)
ANION GAP SERPL CALCULATED.3IONS-SCNC: 6 MMOL/L (ref 4–13)
AST SERPL W P-5'-P-CCNC: 21 U/L (ref 5–45)
BACTERIA UR QL AUTO: NORMAL /HPF
BILIRUB SERPL-MCNC: 0.54 MG/DL (ref 0.2–1)
BILIRUB UR QL STRIP: NEGATIVE
BUN SERPL-MCNC: 15 MG/DL (ref 5–25)
CALCIUM SERPL-MCNC: 9.1 MG/DL (ref 8.3–10.1)
CHLORIDE SERPL-SCNC: 105 MMOL/L (ref 100–108)
CLARITY UR: CLEAR
CO2 SERPL-SCNC: 28 MMOL/L (ref 21–32)
COLOR UR: YELLOW
CREAT SERPL-MCNC: 0.94 MG/DL (ref 0.6–1.3)
CREAT UR-MCNC: 56.3 MG/DL
CREAT UR-MCNC: 56.3 MG/DL
ERYTHROCYTE [DISTWIDTH] IN BLOOD BY AUTOMATED COUNT: 13.9 % (ref 11.6–15.1)
GFR SERPL CREATININE-BSD FRML MDRD: 58 ML/MIN/1.73SQ M
GLUCOSE P FAST SERPL-MCNC: 82 MG/DL (ref 65–99)
GLUCOSE UR STRIP-MCNC: NEGATIVE MG/DL
HCT VFR BLD AUTO: 42.7 % (ref 34.8–46.1)
HGB BLD-MCNC: 13.5 G/DL (ref 11.5–15.4)
HGB UR QL STRIP.AUTO: NEGATIVE
HYALINE CASTS #/AREA URNS LPF: NORMAL /LPF
KETONES UR STRIP-MCNC: NEGATIVE MG/DL
LEUKOCYTE ESTERASE UR QL STRIP: NEGATIVE
MAGNESIUM SERPL-MCNC: 2.5 MG/DL (ref 1.6–2.6)
MCH RBC QN AUTO: 29.9 PG (ref 26.8–34.3)
MCHC RBC AUTO-ENTMCNC: 31.6 G/DL (ref 31.4–37.4)
MCV RBC AUTO: 95 FL (ref 82–98)
MICROALBUMIN UR-MCNC: 5.3 MG/L (ref 0–20)
MICROALBUMIN/CREAT 24H UR: 9 MG/G CREATININE (ref 0–30)
NITRITE UR QL STRIP: NEGATIVE
NON-SQ EPI CELLS URNS QL MICRO: NORMAL /HPF
PH UR STRIP.AUTO: 7 [PH] (ref 4.5–8)
PHOSPHATE SERPL-MCNC: 3.7 MG/DL (ref 2.3–4.1)
PLATELET # BLD AUTO: 228 THOUSANDS/UL (ref 149–390)
PMV BLD AUTO: 9.8 FL (ref 8.9–12.7)
POTASSIUM SERPL-SCNC: 4.2 MMOL/L (ref 3.5–5.3)
PROT SERPL-MCNC: 7 G/DL (ref 6.4–8.2)
PROT UR STRIP-MCNC: NEGATIVE MG/DL
PROT UR-MCNC: 9 MG/DL
PROT/CREAT UR: 0.16 MG/G{CREAT} (ref 0–0.1)
PTH-INTACT SERPL-MCNC: 42.5 PG/ML (ref 18.4–80.1)
RBC # BLD AUTO: 4.51 MILLION/UL (ref 3.81–5.12)
RBC #/AREA URNS AUTO: NORMAL /HPF
SODIUM SERPL-SCNC: 139 MMOL/L (ref 136–145)
SP GR UR STRIP.AUTO: 1.01 (ref 1–1.03)
T3FREE SERPL-MCNC: 2.29 PG/ML (ref 2.3–4.2)
T4 FREE SERPL-MCNC: 1.18 NG/DL (ref 0.76–1.46)
TSH SERPL DL<=0.05 MIU/L-ACNC: 3.1 UIU/ML
URATE SERPL-MCNC: 4.2 MG/DL (ref 2–6.8)
UROBILINOGEN UR QL STRIP.AUTO: 0.2 E.U./DL
WBC # BLD AUTO: 5.42 THOUSAND/UL (ref 4.31–10.16)
WBC #/AREA URNS AUTO: NORMAL /HPF

## 2019-02-04 PROCEDURE — 81001 URINALYSIS AUTO W/SCOPE: CPT

## 2019-02-04 PROCEDURE — 85027 COMPLETE CBC AUTOMATED: CPT

## 2019-02-04 PROCEDURE — 84550 ASSAY OF BLOOD/URIC ACID: CPT

## 2019-02-04 PROCEDURE — 82306 VITAMIN D 25 HYDROXY: CPT

## 2019-02-04 PROCEDURE — 83735 ASSAY OF MAGNESIUM: CPT

## 2019-02-04 PROCEDURE — 84443 ASSAY THYROID STIM HORMONE: CPT

## 2019-02-04 PROCEDURE — 84481 FREE ASSAY (FT-3): CPT

## 2019-02-04 PROCEDURE — 80053 COMPREHEN METABOLIC PANEL: CPT

## 2019-02-04 PROCEDURE — 82570 ASSAY OF URINE CREATININE: CPT

## 2019-02-04 PROCEDURE — 82570 ASSAY OF URINE CREATININE: CPT | Performed by: FAMILY MEDICINE

## 2019-02-04 PROCEDURE — 36415 COLL VENOUS BLD VENIPUNCTURE: CPT

## 2019-02-04 PROCEDURE — 84100 ASSAY OF PHOSPHORUS: CPT

## 2019-02-04 PROCEDURE — 83970 ASSAY OF PARATHORMONE: CPT

## 2019-02-04 PROCEDURE — 84439 ASSAY OF FREE THYROXINE: CPT

## 2019-02-04 PROCEDURE — 82043 UR ALBUMIN QUANTITATIVE: CPT | Performed by: FAMILY MEDICINE

## 2019-02-04 PROCEDURE — 84156 ASSAY OF PROTEIN URINE: CPT

## 2019-02-21 ENCOUNTER — OFFICE VISIT (OUTPATIENT)
Dept: FAMILY MEDICINE CLINIC | Facility: CLINIC | Age: 79
End: 2019-02-21
Payer: MEDICARE

## 2019-02-21 VITALS
WEIGHT: 177.2 LBS | BODY MASS INDEX: 34.79 KG/M2 | SYSTOLIC BLOOD PRESSURE: 128 MMHG | HEIGHT: 60 IN | DIASTOLIC BLOOD PRESSURE: 78 MMHG

## 2019-02-21 DIAGNOSIS — E03.9 ADULT HYPOTHYROIDISM: ICD-10-CM

## 2019-02-21 DIAGNOSIS — N18.2 STAGE 2 CHRONIC KIDNEY DISEASE: ICD-10-CM

## 2019-02-21 DIAGNOSIS — E78.2 MIXED HYPERLIPIDEMIA: Primary | ICD-10-CM

## 2019-02-21 DIAGNOSIS — M54.16 LUMBAR RADICULOPATHY: ICD-10-CM

## 2019-02-21 PROCEDURE — 99214 OFFICE O/P EST MOD 30 MIN: CPT | Performed by: FAMILY MEDICINE

## 2019-02-21 NOTE — PROGRESS NOTES
Assessment/Plan:     Diagnoses and all orders for this visit:    Mixed hyperlipidemia  -     Lipid Panel with Direct LDL reflex; Future    Adult hypothyroidism    Lumbar radiculopathy    Stage 2 chronic kidney disease        Patient is a pleasant 50-year-old female who is here for follow-up to review recent medications  She is stable on her kidney disease as well as her thyroid  Her lumbar spinal stenosis is slightly flared up at this point  She will continue to do nonsurgical approach to same  She is very happy with her new apartment  She will continue to try to make efforts at lifestyle and dietary  We discussed 1500 calorie daily intake and the 10% rule of weight loss  Total time spent 25 minutes with greater than 50% counseling performed  Follow-up in 6 months with lipid profile  Subjective:   Chief Complaint   Patient presents with    Follow-up     6 month follow up    Labs Only     would like to discuss lab results, also not sure why lipids weren't ordered last time, would like to have those done also  last done May 2018    Leg Pain     bilateral leg pain, numbness       Patient ID: Tavo Philippe is a 77 y o  female  Patient is a pleasant 50-year-old female who is here to review recent labs  Her lipids are not due until May  She is having ongoing intermittent symptoms of the low back  She had an injection by her pain specialist on January 6  She does have known spinal stenosis  She is very happy with her new apartment up in [facility]  There is a gym and pool  It is a good community of over 55  She is doing a lot of there social events  She is still on packing some boxes  Her right arm is doing better after PT  She is not happy with her weight  We did discuss 1500 calorie meal plan  She may be deciding to do weight watchers  Mammogram is up-to-date and is normal   DEXA scan was also reviewed    Generally results were improved except for slight decrease in femoral neck T-score but still in the osteopenia range  Patient does calcium and D  GFR 58  Recent labs showed normal electrolytes and vitamin-D level as well as normal PTH level  Thyroid TSH is normal   Patient is up-to-date with her kidney specialist   She will be seeing Dr Elliott Vazquez her eye specialist next week  The following portions of the patient's history were reviewed and updated as appropriate: allergies, current medications, past family history, past medical history, past social history, past surgical history and problem list     Review of Systems   Constitutional: Negative for fatigue and unexpected weight change  HENT: Negative  Eyes: Negative for visual disturbance  Respiratory: Negative for shortness of breath  Cardiovascular: Negative for chest pain and leg swelling  Gastrointestinal: Negative  Genitourinary: Negative  Musculoskeletal: Positive for arthralgias and back pain (As noted)  Psychiatric/Behavioral: Negative  Objective:      /78   Ht 5' (1 524 m)   Wt 80 4 kg (177 lb 3 2 oz)   BMI 34 61 kg/m²          Physical Exam   Constitutional: She is oriented to person, place, and time  She appears well-developed and well-nourished  Pleasant 71-year-old female who looks younger than her stated age with a BMI of 34%   Neck: Normal range of motion  Cardiovascular: Normal rate and regular rhythm  Pulmonary/Chest: Effort normal and breath sounds normal    Abdominal: Soft  Bowel sounds are normal    Musculoskeletal: She exhibits no edema  Negative SLR negative FABR   Neurological: She is alert and oriented to person, place, and time  Psychiatric: She has a normal mood and affect   Her behavior is normal  Judgment and thought content normal

## 2019-03-07 ENCOUNTER — OFFICE VISIT (OUTPATIENT)
Dept: FAMILY MEDICINE CLINIC | Facility: CLINIC | Age: 79
End: 2019-03-07
Payer: MEDICARE

## 2019-03-07 VITALS
SYSTOLIC BLOOD PRESSURE: 124 MMHG | OXYGEN SATURATION: 99 % | TEMPERATURE: 97.3 F | DIASTOLIC BLOOD PRESSURE: 80 MMHG | WEIGHT: 177 LBS | HEART RATE: 62 BPM | BODY MASS INDEX: 34.75 KG/M2 | HEIGHT: 60 IN

## 2019-03-07 DIAGNOSIS — M94.0 COSTOCHONDRITIS: ICD-10-CM

## 2019-03-07 DIAGNOSIS — J06.9 VIRAL URI: Primary | ICD-10-CM

## 2019-03-07 DIAGNOSIS — M48.061 SPINAL STENOSIS OF LUMBAR REGION, UNSPECIFIED WHETHER NEUROGENIC CLAUDICATION PRESENT: ICD-10-CM

## 2019-03-07 PROCEDURE — 99214 OFFICE O/P EST MOD 30 MIN: CPT | Performed by: NURSE PRACTITIONER

## 2019-03-07 NOTE — PATIENT INSTRUCTIONS
Continue to nurse cold symptoms at home which is conservative management, nasal saline spray  Look into medical marijuana for pain  [email or web address]    Continue with tylenol as needed, do not exceed 4000mg in one day  Cold Symptoms, Ambulatory Care   GENERAL INFORMATION:   Cold symptoms  include sneezing, dry throat, a stuffy nose, headache, watery eyes, and a cough  Your cough may be dry, or you may cough up mucus  You may also have muscle aches, joint pain, and tiredness  Rarely, you may have a fever  Cold symptoms occur from inflammation in your upper respiratory system caused by a virus  Most colds go away without treatment  Seek immediate care for the following symptoms:   · A heartbeat that is much faster than usual for you     · A swollen neck that is sore to the touch     · Increased tiredness and weakness    · Pinpoint or larger reddish-purple dots on your skin     · Poor or no appetite  Treatment for cold symptoms  may include NSAIDS to decrease muscle aches and fever  Do not give NSAID medicines to children under 10months of age without direction from your child's doctor  Cold medicines may also be given to decrease coughing, nasal stuffiness, sneezing, and a runny nose  Do not give cold medicines to children under 11years of age without direction from your child's doctor  Manage your cold symptoms with the following:   · Drink liquids  to help thin and loosen thick mucus so you can cough it up  Liquids will also keep you hydrated  Ask your healthcare provider which liquids are best for you and how much to drink each day  · Do not smoke  because it may worsen your symptoms and increase the length of time you feel sick  Talk with your healthcare provider if you need help to stop smoking  Prevent the spread of germs  by washing your hands often   You can spread your cold germs to others for at least 3 days after your symptoms start  Do not share items, such as eating utensils  Cover your nose and mouth when you cough or sneeze using the crook of your elbow instead of your hands  Throw used tissues in the garbage  Follow up with your healthcare provider as directed:  Write down your questions so you remember to ask them during your visits  CARE AGREEMENT:   You have the right to help plan your care  Learn about your health condition and how it may be treated  Discuss treatment options with your caregivers to decide what care you want to receive  You always have the right to refuse treatment  The above information is an  only  It is not intended as medical advice for individual conditions or treatments  Talk to your doctor, nurse or pharmacist before following any medical regimen to see if it is safe and effective for you  © 2014 [address] is for End User's use only and may not be sold, redistributed or otherwise used for commercial purposes  All illustrations and images included in CareNotes® are the copyrighted property of [employer]  or [employer]

## 2019-03-07 NOTE — PROGRESS NOTES
Assessment/Plan:    Viral URI   Will continue to manage conservatively at home with increased fluids and saline nasal spray  Please call the office if you are experiencing any worsening of symptoms or no symptom improvement  Costochondritis   Secondary from over exertion with moving and or removing snow from car  Pain is reproducible with specific movements  Tylenol helpful and patient okay to continue with using tylenol PRN, advised on tylenol dosing and to not exceed 4 gram/24 hours and to only use short term  Patient has hx fatty liver but liver enzymes recently checked WNL  Patient avoids NSAIDs due to renal insufficiency  Please call the office if you are experiencing any worsening of symptoms or no symptom improvement  Spinal stenosis of lumbar region  Discussed alternative pain management options with patient  She is interested in medical marijuana  Discussed process with her and gave names of licensed providers  Patient would quality given her chronic pain and failed other measures  Please call the office if you are experiencing any worsening of symptoms or no symptom improvement  Diagnoses and all orders for this visit:    Viral URI    Costochondritis    Spinal stenosis of lumbar region, unspecified whether neurogenic claudication present      Patient verbalizes understand and agrees with treatment plan  Subjective:        Patient ID: Everton Montejo is a 77 y o  female  Chief Complaint   Patient presents with    Abdominal Pain     RUQ and LUQ radiating into back; also c/o sore throat with slight "bloody drainage" from her nose       Patient presents to office today for cold symptoms and rib cage pain  Pain underneath bra started a week ago  Worse with deep breaths in cold air or with twisting and worse in the morning  Would rate 5-10/10 and sharp  Tylenol arthritis helps  Cleared snow off the top of her car, but didn't do any shoveling     Has been moving a lot of boxes as she is still unpacking  Pain is reproducible with certain movements  No rash present  Patient's cold symptoms consist of rhinorrhea and sore throat, she does get some bloody discharge from left nostril  Patient states she did have full resolution of symptoms from being sick last month and this is new  The following portions of the patient's history were reviewed and updated as appropriate: allergies, current medications, past family history, past social history and problem list     Review of Systems   Constitutional: Positive for fatigue  Negative for chills and fever  HENT: Positive for congestion, postnasal drip and sore throat  Negative for ear pain, rhinorrhea and sinus pressure  Eyes: Negative for pain and visual disturbance  Respiratory: Negative for cough, chest tightness and shortness of breath  Cardiovascular: Negative for chest pain, palpitations and leg swelling  Gastrointestinal: Negative for abdominal pain, diarrhea, nausea and vomiting  Genitourinary: Negative for difficulty urinating and dysuria  Musculoskeletal: Positive for arthralgias  Negative for myalgias  Skin: Negative for color change and rash  Neurological: Negative for dizziness, syncope, numbness and headaches  Hematological: Negative for adenopathy  Psychiatric/Behavioral: Negative for agitation and behavioral problems  The patient is not nervous/anxious  Objective:  /80 (BP Location: Left arm, Patient Position: Sitting, Cuff Size: Large)   Pulse 62   Temp (!) 97 3 °F (36 3 °C) (Tympanic)   Ht 5' (1 524 m)   Wt 80 3 kg (177 lb)   SpO2 99%   BMI 34 57 kg/m²      Physical Exam   Constitutional: She is oriented to person, place, and time  She appears well-developed  No distress  obese   HENT:   Head: Normocephalic and atraumatic     Right Ear: Hearing, tympanic membrane, external ear and ear canal normal    Left Ear: Hearing, tympanic membrane, external ear and ear canal normal    Nose: Mucosal edema and rhinorrhea present  Mouth/Throat: Uvula is midline  No oropharyngeal exudate, posterior oropharyngeal edema or posterior oropharyngeal erythema  Eyes: Conjunctivae and lids are normal  Right eye exhibits no discharge  Left eye exhibits no discharge  Neck: Normal range of motion  Neck supple  No tracheal deviation present  Cardiovascular: Normal rate and regular rhythm  No murmur heard  Pulmonary/Chest: Effort normal and breath sounds normal  No respiratory distress  She has no wheezes  Abdominal: Soft  Bowel sounds are normal  She exhibits no distension  There is no tenderness  There is no guarding  Musculoskeletal: Normal range of motion  She exhibits tenderness  She exhibits no edema or deformity  Rib pain with bending/ moving forward, no rash/ skin changes   Lymphadenopathy:     She has no cervical adenopathy  Neurological: She is alert and oriented to person, place, and time  Skin: Skin is warm and dry  No rash noted  She is not diaphoretic  No erythema  Psychiatric: She has a normal mood and affect  Her speech is normal and behavior is normal  Judgment and thought content normal  Cognition and memory are normal    Nursing note and vitals reviewed

## 2019-03-07 NOTE — ASSESSMENT & PLAN NOTE
Discussed alternative pain management options with patient  She is interested in medical marijuana  Discussed process with her and gave names of licensed providers  Patient would quality given her chronic pain and failed other measures  Please call the office if you are experiencing any worsening of symptoms or no symptom improvement

## 2019-03-12 ENCOUNTER — TELEPHONE (OUTPATIENT)
Dept: FAMILY MEDICINE CLINIC | Facility: CLINIC | Age: 79
End: 2019-03-12

## 2019-03-12 DIAGNOSIS — M48.061 SPINAL STENOSIS OF LUMBAR REGION, UNSPECIFIED WHETHER NEUROGENIC CLAUDICATION PRESENT: Primary | ICD-10-CM

## 2019-03-12 RX ORDER — PREDNISONE 10 MG/1
TABLET ORAL
Qty: 21 TABLET | Refills: 0 | Status: SHIPPED | OUTPATIENT
Start: 2019-03-12 | End: 2019-07-09 | Stop reason: ALTCHOICE

## 2019-03-12 NOTE — TELEPHONE ENCOUNTER
Pt is willing to do so  [address] her last steroid injection in her spine was January 9th 2019 with Dr Solis Lazar

## 2019-03-12 NOTE — TELEPHONE ENCOUNTER
Pt states she has an appt with pain management 04/09/2019  Still having pain, mostly in left leg radiating into hip and lower back along with her R knee  She is taking Tylenol PRN and OTC cream which is not really helping and having a hard time sleeping  Wants to know what to do until her appt       CB# [phone]

## 2019-07-09 ENCOUNTER — OFFICE VISIT (OUTPATIENT)
Dept: FAMILY MEDICINE CLINIC | Facility: CLINIC | Age: 79
End: 2019-07-09
Payer: MEDICARE

## 2019-07-09 VITALS
BODY MASS INDEX: 34.55 KG/M2 | HEART RATE: 79 BPM | SYSTOLIC BLOOD PRESSURE: 112 MMHG | HEIGHT: 60 IN | OXYGEN SATURATION: 97 % | WEIGHT: 176 LBS | DIASTOLIC BLOOD PRESSURE: 64 MMHG | TEMPERATURE: 98.4 F

## 2019-07-09 DIAGNOSIS — R60.0 LOWER LEG EDEMA: Primary | ICD-10-CM

## 2019-07-09 DIAGNOSIS — I83.813 VARICOSE VEINS OF BOTH LOWER EXTREMITIES WITH PAIN: ICD-10-CM

## 2019-07-09 PROCEDURE — 99213 OFFICE O/P EST LOW 20 MIN: CPT | Performed by: NURSE PRACTITIONER

## 2019-07-09 NOTE — PROGRESS NOTES
Assessment/Plan:    Bilateral Lower extremity / Varicose Veins with Pain   Will get venous duplex and echocardiogram  Advised to limit sodium intake and elevate legs when possible  Advised to call for any worsening pain, any redness, worsening swelling, chest pain or shortness of breath  Please call the office if you are experiencing any worsening of symptoms or no symptom improvement  Handout provided  Diagnoses and all orders for this visit:    Lower leg edema  -     Echo complete with contrast if indicated; Future  -     VAS lower limb venous duplex study, complete bilateral; Future    Varicose veins of both lower extremities with pain  -     Echo complete with contrast if indicated; Future  -     VAS lower limb venous duplex study, complete bilateral; Future      Patient verbalizes understand and agrees with treatment plan  Subjective:        Patient ID: Gloria Sutherland is a 77 y o  female  Chief Complaint   Patient presents with    Ankle Swelling     B/L ankles radiating into both feet; constant for 2 months; also has visual veins on both ankles, no SOB indicated; slight redness/purple, no pain  Here for evaluation of lower leg swelling  This has been for 2 months  Bilateral ankles radiating into both feet  She normally sees some more swelling in the summer time but it's not to this extent  Legs are sore  No chest pain or shortness of breath  Patient does have CKD but this has been closely monitored and stable  She states she had echo done a long time ago which was done because she was on Fen Phen for 6 months in the past  Has likely had high sodium diet over the weekend but states she didn't pay close attention to it  She has appointment with Dr Massiel Blair at [facility] for EASY procedure on back for spinal stenosis on 7/24, he advised she get the edema evaluated prior to surgery  She states she does not need a pre op visit           The following portions of the patient's history were reviewed and updated as appropriate: allergies, current medications, past family history, past social history and problem list     Review of Systems   Constitutional: Negative for chills and fever  HENT: Negative for congestion  Eyes: Negative for pain and visual disturbance  Respiratory: Negative for cough and shortness of breath  Cardiovascular: Positive for leg swelling  Negative for chest pain and palpitations  Gastrointestinal: Negative for abdominal pain, diarrhea, nausea and vomiting  Genitourinary: Negative for difficulty urinating and dysuria  Musculoskeletal: Negative for arthralgias and myalgias  Skin: Negative for color change and rash  Neurological: Negative for dizziness, syncope, numbness and headaches  Hematological: Negative for adenopathy  Psychiatric/Behavioral: Negative for agitation and behavioral problems  The patient is not nervous/anxious  Objective:  /64 (BP Location: Left arm, Patient Position: Sitting, Cuff Size: Large)   Pulse 79   Temp 98 4 °F (36 9 °C) (Temporal)   Ht 5' (1 524 m)   Wt 79 8 kg (176 lb)   SpO2 97%   BMI 34 37 kg/m²      Physical Exam   Constitutional: She is oriented to person, place, and time  She appears well-developed  No distress  HENT:   Head: Normocephalic and atraumatic  Right Ear: External ear normal    Left Ear: External ear normal    Nose: Nose normal    Eyes: Conjunctivae and lids are normal  Right eye exhibits no discharge  Left eye exhibits no discharge  Neck: Normal range of motion  Neck supple  No tracheal deviation present  Cardiovascular: Normal rate and regular rhythm  No murmur heard  Pulmonary/Chest: Effort normal and breath sounds normal  No respiratory distress  She has no wheezes  Abdominal: Soft  Bowel sounds are normal  She exhibits no distension  There is no tenderness  There is no guarding  Musculoskeletal: She exhibits edema (BLLE +1, no erythema, no bruising, no warmthd)   She exhibits no deformity  Lymphadenopathy:     She has no cervical adenopathy  Neurological: She is alert and oriented to person, place, and time  Coordination normal    Skin: Skin is warm and dry  No rash noted  She is not diaphoretic  No erythema  Psychiatric: She has a normal mood and affect  Her speech is normal and behavior is normal  Judgment and thought content normal  Cognition and memory are normal    Nursing note and vitals reviewed

## 2019-07-09 NOTE — PATIENT INSTRUCTIONS
Call to schedule ultrasound ultrasound of heart and legs  We will follow up with results  Try to adhere to low sodium diet  Elevate legs when possible  Please call the office if you are experiencing any worsening of symptoms or no symptom improvement  Leg Edema   WHAT YOU NEED TO KNOW:   Leg edema is swelling caused by fluid buildup  Your legs may swell if you sit or stand for long periods of time, are pregnant, or are injured  Swelling may also occur if you have heart failure or circulation problems  This means that your heart does not pump blood through your body as it should  DISCHARGE INSTRUCTIONS:   Self-care:   · Elevate your legs:  Raise your legs above the level of your heart as often as you can  This will help decrease swelling and pain  Prop your legs on pillows or blankets to keep them elevated comfortably  · Wear pressure stockings: These tight stockings put pressure on your legs to promote blood flow and prevent blood clots  Wear the stockings during the day  Do not wear them while you sleep  · Apply heat:  Heat helps decrease pain and swelling  Apply heat on the area for 20 to 30 minutes every 2 hours for as many days as directed  · Stay active:  Do not stand or sit for long periods of time  Ask your healthcare provider about the best exercise plan for you  · Eat healthy foods:  Healthy foods include fruits, vegetables, whole-grain breads, low-fat dairy products, beans, lean meats, and fish  Ask if you need to be on a special diet  Limit salt  Salt will make your body hold even more fluid  Follow up with your healthcare provider as directed:  Write down your questions so you remember to ask them during your visits  Contact your healthcare provider if:   · You have a fever or feel more tired than usual     · The veins in your legs look larger than usual  They may look full or bulging  · Your legs itch or feel heavy  · You have red or white areas or sores on your legs  The skin may also appear dimpled or have indentations  · You are gaining weight  · You have trouble moving your ankles  · The swelling does not go away, or other parts of your body swell  · You have questions or concerns about your condition or care  Return to the emergency department if:   · You cannot walk  · You feel faint or confused  · Your skin turns blue or gray  · Your leg feels warm, tender, and painful  It may be swollen and red  · You have chest pain or trouble breathing that is worse when you lie down  · You suddenly feel lightheaded and have trouble breathing  · You have new and sudden chest pain  You may have more pain when you take deep breaths or cough  You may also cough up blood  © 2017 [facility] [facility] Information is for End User's use only and may not be sold, redistributed or otherwise used for commercial purposes  All illustrations and images included in CareNotes® are the copyrighted property of A D A M , Inc  or Corbin Machado  The above information is an  only  It is not intended as medical advice for individual conditions or treatments  Talk to your doctor, nurse or pharmacist before following any medical regimen to see if it is safe and effective for you

## 2019-07-16 ENCOUNTER — HOSPITAL ENCOUNTER (OUTPATIENT)
Dept: NON INVASIVE DIAGNOSTICS | Facility: CLINIC | Age: 79
Discharge: HOME | End: 2019-07-16
Payer: MEDICARE

## 2019-07-16 DIAGNOSIS — I83.813 VARICOSE VEINS OF BOTH LOWER EXTREMITIES WITH PAIN: ICD-10-CM

## 2019-07-16 DIAGNOSIS — R60.0 LOWER LEG EDEMA: ICD-10-CM

## 2019-07-16 PROCEDURE — 93970 EXTREMITY STUDY: CPT

## 2019-07-17 PROCEDURE — 93970 EXTREMITY STUDY: CPT | Performed by: SURGERY

## 2019-07-19 ENCOUNTER — HOSPITAL ENCOUNTER (OUTPATIENT)
Dept: NON INVASIVE DIAGNOSTICS | Facility: CLINIC | Age: 79
Discharge: HOME | End: 2019-07-19
Payer: MEDICARE

## 2019-07-19 DIAGNOSIS — R60.0 LOWER LEG EDEMA: ICD-10-CM

## 2019-07-19 DIAGNOSIS — I83.813 VARICOSE VEINS OF BOTH LOWER EXTREMITIES WITH PAIN: ICD-10-CM

## 2019-07-19 PROCEDURE — 93306 TTE W/DOPPLER COMPLETE: CPT

## 2019-07-19 PROCEDURE — 93306 TTE W/DOPPLER COMPLETE: CPT | Performed by: INTERNAL MEDICINE

## 2019-08-06 ENCOUNTER — APPOINTMENT (OUTPATIENT)
Dept: LAB | Age: 79
End: 2019-08-06
Payer: MEDICARE

## 2019-08-06 DIAGNOSIS — E78.2 MIXED HYPERLIPIDEMIA: ICD-10-CM

## 2019-08-06 DIAGNOSIS — E03.9 ADULT HYPOTHYROIDISM: ICD-10-CM

## 2019-08-06 LAB
CHOLEST SERPL-MCNC: 234 MG/DL (ref 50–200)
HDLC SERPL-MCNC: 73 MG/DL (ref 40–60)
LDLC SERPL CALC-MCNC: 146 MG/DL (ref 0–100)
TRIGL SERPL-MCNC: 74 MG/DL

## 2019-08-06 PROCEDURE — 36415 COLL VENOUS BLD VENIPUNCTURE: CPT

## 2019-08-06 PROCEDURE — 80061 LIPID PANEL: CPT

## 2019-08-06 RX ORDER — LEVOTHYROXINE SODIUM 0.05 MG/1
TABLET ORAL
Qty: 90 TABLET | Refills: 3 | Status: SHIPPED | OUTPATIENT
Start: 2019-08-06 | End: 2020-07-30 | Stop reason: SDUPTHER

## 2019-08-21 ENCOUNTER — OFFICE VISIT (OUTPATIENT)
Dept: FAMILY MEDICINE CLINIC | Facility: CLINIC | Age: 79
End: 2019-08-21
Payer: MEDICARE

## 2019-08-21 VITALS
DIASTOLIC BLOOD PRESSURE: 78 MMHG | HEIGHT: 60 IN | OXYGEN SATURATION: 98 % | WEIGHT: 181 LBS | SYSTOLIC BLOOD PRESSURE: 122 MMHG | HEART RATE: 69 BPM | BODY MASS INDEX: 35.53 KG/M2 | TEMPERATURE: 98.3 F

## 2019-08-21 DIAGNOSIS — Z80.0 FAMILY HISTORY OF PANCREATIC CANCER: ICD-10-CM

## 2019-08-21 DIAGNOSIS — E03.9 ADULT HYPOTHYROIDISM: Primary | ICD-10-CM

## 2019-08-21 DIAGNOSIS — Z87.891 SMOKING HISTORY: ICD-10-CM

## 2019-08-21 DIAGNOSIS — G62.9 PERIPHERAL POLYNEUROPATHY: ICD-10-CM

## 2019-08-21 DIAGNOSIS — E78.01 FAMILIAL HYPERCHOLESTEROLEMIA: ICD-10-CM

## 2019-08-21 PROCEDURE — 99214 OFFICE O/P EST MOD 30 MIN: CPT | Performed by: FAMILY MEDICINE

## 2019-08-21 RX ORDER — ACETAMINOPHEN AND CODEINE PHOSPHATE 300; 30 MG/1; MG/1
1 TABLET ORAL
Refills: 0 | COMMUNITY
Start: 2019-07-24 | End: 2021-02-04

## 2019-08-21 NOTE — PROGRESS NOTES
Assessment/Plan:     Diagnoses and all orders for this visit:    Adult hypothyroidism  -     T3, free; Future  -     T4, free; Future  -     TSH, 3rd generation; Future  -     Comprehensive metabolic panel; Future    Familial hypercholesterolemia    Peripheral polyneuropathy  -     EMG 2 limb lower extremity; Future    Family history of pancreatic cancer  -     Ambulatory Referral to Oncology Genetics; Future    Smoking history    Other orders  -     acetaminophen-codeine (TYLENOL #3) 300-30 mg per tablet; Take 1 tablet by mouth every 4 to 6 hours as needed for pain          Subjective:   Chief Complaint   Patient presents with    Follow-up     regarding thyroid, HL, review Lipid Panel; review Echo and Vas Study  pt here with multiple medical questions  Patient ID: [place] is a 77 y o  female  Patient is a pleasant 69-year-old female who is here for follow-up on lab work with regards to thyroid and lipidemia  Also is following up on vascular screening and echo  Echocardiogram with 60% ejection fraction  There is some grade 1 diastolic dysfunction  Mitral valve with mild annular calcification  Both left and right atrium normal size  No regurgitant her stenotic valve findings  Normal inferior vena cava  Lower extremity venous Doppler with no acute or chronic DVT noted no superficial thrombophlebitis noted  Patient is enjoying [facility]  Mammography is up-to-date patient does see [facility] for gyn checkup  Will be time to update yearly labs when she follows up in 6 months  Lipid profile not ideal with increase numbers from last year but HDL improved  The following portions of the patient's history were reviewed and updated as appropriate: allergies, current medications, past family history, past medical history, past social history, past surgical history and problem list     Review of Systems   Constitutional: Negative for fatigue     HENT: Negative for dental problem  Eyes: Visual disturbance: Glaucoma testing up-to-date  Respiratory: Negative for shortness of breath  Cardiovascular: Negative for chest pain  Gastrointestinal: Negative  Genitourinary: Negative for dysuria  Skin: Negative  Neurological: Negative  Numbness:  questionable neuropathy  Psychiatric/Behavioral: The patient is not nervous/anxious  Objective:      /78 (BP Location: Left arm, Patient Position: Sitting)   Pulse 69   Temp 98 3 °F (36 8 °C) (Temporal)   Ht 4' 11 5" (1 511 m)   Wt 82 1 kg (181 lb)   SpO2 98%   BMI 35 95 kg/m²          Physical Exam   Constitutional: She is oriented to person, place, and time  She appears well-developed and well-nourished  Pleasant 65-year-old female who looks younger than her stated age with BMI of 35%   HENT:   Nose: Nose normal    Mouth/Throat: Oropharynx is clear and moist    Eyes: Pupils are equal, round, and reactive to light  Neck: No thyromegaly present  Cardiovascular: Normal rate, regular rhythm, normal heart sounds and intact distal pulses  No carotid abdominal or femoral bruit   Pulmonary/Chest: Effort normal and breath sounds normal    Abdominal: Soft  Bowel sounds are normal    Musculoskeletal: Normal range of motion  Neurological: She is alert and oriented to person, place, and time  Skin: Skin is warm  Psychiatric: She has a normal mood and affect  Her behavior is normal  Judgment and thought content normal    Vitals reviewed

## 2019-08-21 NOTE — PROGRESS NOTES
BMI Counseling: There is no height or weight on file to calculate BMI  Discussed the patient's BMI with her  The BMI is above average  BMI counseling and education was provided to the patient  Nutrition recommendations include reducing portion sizes, reducing fast food intake, decreasing soda and/or juice intake, moderation in carbohydrate intake and increasing intake of lean protein  Exercise recommendations include moderate aerobic physical activity for 150 minutes/week

## 2019-08-22 ENCOUNTER — TELEPHONE (OUTPATIENT)
Dept: HEMATOLOGY ONCOLOGY | Facility: CLINIC | Age: 79
End: 2019-08-22

## 2019-08-22 NOTE — TELEPHONE ENCOUNTER
New Patient Encounter      New Patient Intake Form   Patient Details:  Cha Lassiter  1940  [id number]    Background Information:  [address] starts by opening a telephone encounter and gathering the following information   Who is calling to schedule? If not self, relationship to patient? self   Referring Provider Beverly Jules   What is the diagnosis?  / genetic cons  When was the diagnosis? fam hx of pancreatic ca   Is patient aware of diagnosis? Yes   Reason for visit? fam hx of panc  ca   Have you had any testing done? If so: when, where? No   Are records in EPIC? Yes   Was the patient told to bring a disk? N/A   Scheduling Information:   Preferred [place]:  [facility]   Requesting Specific Provider? no   Are there any dates/time the patient cannot be seen? no   Counseling Pre-Screen:  If the patient answers YES to any of the below questions and has a cancer diagnosis, please route to the appropriate location specific counselor    Have you felt anxious or worried about your diagnosis and/or the treatment you are receiving? No   Has your diagnosis caused physical, emotional, or financial hardship for you? No   Do you anticipate any transportation issues to get to your appointment? No   Miscellaneous: na   After completing the above information, please route to Financial Counselor and the appropriate Nurse Navigator for review

## 2019-09-03 ENCOUNTER — CONSULT (OUTPATIENT)
Dept: GYNECOLOGIC ONCOLOGY | Facility: CLINIC | Age: 79
End: 2019-09-03
Payer: MEDICARE

## 2019-09-03 VITALS
HEART RATE: 77 BPM | RESPIRATION RATE: 16 BRPM | HEIGHT: 60 IN | SYSTOLIC BLOOD PRESSURE: 136 MMHG | DIASTOLIC BLOOD PRESSURE: 70 MMHG | BODY MASS INDEX: 35.65 KG/M2 | WEIGHT: 181.6 LBS

## 2019-09-03 DIAGNOSIS — Z13.79 GENETIC TESTING: Primary | ICD-10-CM

## 2019-09-03 DIAGNOSIS — Z80.0 FAMILY HISTORY OF PANCREATIC CANCER: ICD-10-CM

## 2019-09-03 PROCEDURE — 99203 OFFICE O/P NEW LOW 30 MIN: CPT | Performed by: NURSE PRACTITIONER

## 2019-09-03 NOTE — ASSESSMENT & PLAN NOTE
65-year-old referred by her PCP for a family history of pancreatic cancer on her mother's and father's side  She has no personal history of cancer  We discussed risk and benefits of genetic testing  She understands the possible outcomes of testing, including no pathogenic mutation, a positive pathogenic mutation, and variant of undetermined signficance (VUS)  We discussed surveillance and prophylactic measures in the event of a positive finding  We discussed implications for the patient's first degree family members if a pathogenic mutation is identified  The patient verbalizes understanding and agrees to proceed with testing  A blood sample was collected and will be sent to [facility] for processing  She understands that she will be contacted by the company in the event her OOP co-payment exceeds $100  She has elected to have her results disclosed over the phone when available in approximately 2-4 weeks  In the event of a positive finding, the patient will be referred for formal genetic counseling, and any other appropriate referrals will also be made      30 minutes were spent in the care of this patient  Greater than 50% of the visit was spent in counseling and discussion of risks, benefits, and outcomes of genetic testing

## 2019-09-03 NOTE — PROGRESS NOTES
Kelsi Brent  1940  [address] [facility] [phone]    Chief Complaint   Patient presents with    Consult     Genetic Testing       Assessment/Plan     Assessment:  1  Genetic testing     2  Family history of pancreatic cancer  Ambulatory Referral to Oncology Genetics     Cancer Staging  No matching staging information was found for the patient  Plan: 65-year-old referred by her PCP for a family history of pancreatic cancer on her mother's and father's side  She has no personal history of cancer  We discussed risk and benefits of genetic testing  She understands the possible outcomes of testing, including no pathogenic mutation, a positive pathogenic mutation, and variant of undetermined signficance (VUS)  We discussed surveillance and prophylactic measures in the event of a positive finding  We discussed implications for the patient's first degree family members if a pathogenic mutation is identified  The patient verbalizes understanding and agrees to proceed with testing  A blood sample was collected and will be sent to [facility] for processing  She understands that she will be contacted by the company in the event her OOP co-payment exceeds $100  She has elected to have her results disclosed over the phone when available in approximately 2-4 weeks  In the event of a positive finding, the patient will be referred for formal genetic counseling, and any other appropriate referrals will also be made      30 minutes were spent in the care of this patient  Greater than 50% of the visit was spent in counseling and discussion of risks, benefits, and outcomes of genetic testing  History of Present Illness: This is a 65-year-old with a family history significant for pancreatic cancer in her mother, maternal aunt, and paternal aunt  She has no personal history of cancer   She is clinically well and is without complaints today         Review of Systems   Constitutional: Negative for chills, fatigue, fever and unexpected weight change  HENT: Negative for nosebleeds  Eyes: Negative  Respiratory: Negative for cough, chest tightness, shortness of breath and wheezing  Cardiovascular: Negative for chest pain, palpitations and leg swelling  Gastrointestinal: Negative for abdominal distention, abdominal pain, anal bleeding, blood in stool, constipation, diarrhea, nausea, rectal pain and vomiting  Endocrine: Negative  Genitourinary: Negative for difficulty urinating, dysuria, frequency, hematuria, pelvic pain, urgency, vaginal bleeding, vaginal discharge and vaginal pain  Musculoskeletal: Negative for arthralgias and joint swelling  Skin: Negative for color change, pallor and rash  Neurological: Negative for dizziness, weakness, light-headedness, numbness and headaches  Hematological: Negative  Psychiatric/Behavioral: Negative          Past Medical History:   Diagnosis Date    Arthritis     Colon polyps     Disease of thyroid gland     Hyperlipidemia     Migraine     Pure hypercholesterolemia     Thyroid disease        Past Surgical History:   Procedure Laterality Date    DILATION AND CURETTAGE, DIAGNOSTIC / THERAPEUTIC      TONSILLECTOMY      TUBAL LIGATION         OB History        2    Para   2    Term   2            AB        Living           SAB        TAB        Ectopic        Multiple        Live Births                     Family History   Problem Relation Age of Onset    Pancreatic cancer Mother 79        susceptibility     Kidney disease Father     Pancreatic cancer Maternal Aunt         over 48    Pancreatic cancer Paternal Aunt         over 48       Social History     Socioeconomic History    Marital status:      Spouse name: Not on file    Number of children: Not on file    Years of education: Not on file    Highest education level: Not on file   Occupational History    Not on file   Social Needs    Financial resource strain: Not on file    Food insecurity:     Worry: Not on file     Inability: Not on file    Transportation needs:     Medical: Not on file     Non-medical: Not on file   Tobacco Use    Smoking status: Former Smoker     Types: Cigarettes     Last attempt to quit:      Years since quittin 6    Smokeless tobacco: Never Used   Substance and Sexual Activity    Alcohol use:  Yes     Alcohol/week: 1 0 standard drinks     Types: 1 Glasses of wine per week     Comment: social     Drug use: No    Sexual activity: Not on file   Lifestyle    Physical activity:     Days per week: Not on file     Minutes per session: Not on file    Stress: Not on file   Relationships    Social connections:     Talks on phone: Not on file     Gets together: Not on file     Attends Jain service: Not on file     Active member of club or organization: Not on file     Attends meetings of clubs or organizations: Not on file     Relationship status: Not on file    Intimate partner violence:     Fear of current or ex partner: Not on file     Emotionally abused: Not on file     Physically abused: Not on file     Forced sexual activity: Not on file   Other Topics Concern    Not on file   Social History Narrative    Patient education -Asthma resolved     Caffeine use          Current Outpatient Medications:     acetaminophen-codeine (TYLENOL #3) 300-30 mg per tablet, Take 1 tablet by mouth every 4 to 6 hours as needed for pain, Disp: , Rfl: 0    albuterol (PROAIR HFA) 90 mcg/act inhaler, Inhale 2 puffs every 6 (six) hours as needed for wheezing or shortness of breath, Disp: 1 Inhaler, Rfl: 1    Ascorbic Acid (VITAMIN C) 100 MG tablet, Take 100 mg by mouth daily  , Disp: , Rfl:     bimatoprost (LUMIGAN) 0 01 % ophthalmic drops, Administer 1 drop to both eyes daily at bedtime  , Disp: , Rfl:     Cholecalciferol (VITAMIN D) 2000 units CAPS, Take 1 tablet by mouth daily, Disp: , Rfl:     levothyroxine 50 mcg tablet, TAKE 1 TABLET(50 MCG) BY MOUTH DAILY, Disp: 90 tablet, Rfl: 3    Multiple Vitamins-Minerals (MULTIVITAMIN ADULT PO), Take by mouth daily, Disp: , Rfl:     pravastatin (PRAVACHOL) 20 mg tablet, Take 1 tablet (20 mg total) by mouth daily, Disp: 90 tablet, Rfl: 3    Allergies   Allergen Reactions    Molds & Smuts Shortness Of Breath    Penicillins Hives, Rash and Edema     Category: Allergy;     Mold Extract [Trichophyton]        Physical Exam   Constitutional: She is oriented to person, place, and time  She appears well-developed and well-nourished  No distress  HENT:   Head: Normocephalic and atraumatic  Pulmonary/Chest: Effort normal  No respiratory distress  Musculoskeletal: Normal range of motion  Neurological: She is alert and oriented to person, place, and time  Skin: Skin is warm and dry  No rash noted  She is not diaphoretic  No erythema  No pallor  Psychiatric: She has a normal mood and affect   Her behavior is normal  Judgment and thought content normal

## 2019-09-12 ENCOUNTER — TELEPHONE (OUTPATIENT)
Dept: GYNECOLOGIC ONCOLOGY | Facility: CLINIC | Age: 79
End: 2019-09-12

## 2019-09-12 ENCOUNTER — TELEPHONE (OUTPATIENT)
Dept: FAMILY MEDICINE CLINIC | Facility: CLINIC | Age: 79
End: 2019-09-12

## 2019-09-12 NOTE — TELEPHONE ENCOUNTER
Pt called the office inquiring if we do have the shingrix 2 dose vaccine for shingles  I informed pt yes she needs to check with her insurance if it is covered  and if it is covered for her to have it in the office or at the pharmacy   I informed pt once she gets an answer to please call the office and we may schedule an appointment (once get approval form provider)

## 2019-09-12 NOTE — TELEPHONE ENCOUNTER
Genetic testing results are NEGATIVE for pathogenic mutations or variants  Patient aware of results  Report to be scanned into Epic

## 2019-10-16 ENCOUNTER — TELEPHONE (OUTPATIENT)
Dept: FAMILY MEDICINE CLINIC | Facility: CLINIC | Age: 79
End: 2019-10-16

## 2019-10-16 ENCOUNTER — OFFICE VISIT (OUTPATIENT)
Dept: FAMILY MEDICINE CLINIC | Facility: CLINIC | Age: 79
End: 2019-10-16
Payer: MEDICARE

## 2019-10-16 VITALS
WEIGHT: 177 LBS | DIASTOLIC BLOOD PRESSURE: 78 MMHG | HEIGHT: 60 IN | TEMPERATURE: 98.5 F | HEART RATE: 70 BPM | BODY MASS INDEX: 34.75 KG/M2 | OXYGEN SATURATION: 97 % | SYSTOLIC BLOOD PRESSURE: 148 MMHG

## 2019-10-16 DIAGNOSIS — E66.9 OBESITY (BMI 30-39.9): ICD-10-CM

## 2019-10-16 DIAGNOSIS — M54.32 SCIATICA, LEFT SIDE: Primary | ICD-10-CM

## 2019-10-16 DIAGNOSIS — N18.30 CKD (CHRONIC KIDNEY DISEASE) STAGE 3, GFR 30-59 ML/MIN (HCC): ICD-10-CM

## 2019-10-16 PROCEDURE — 99214 OFFICE O/P EST MOD 30 MIN: CPT | Performed by: FAMILY MEDICINE

## 2019-10-16 RX ORDER — PREDNISONE 10 MG/1
TABLET ORAL
Qty: 21 TABLET | Refills: 0 | Status: SHIPPED | OUTPATIENT
Start: 2019-10-16 | End: 2019-12-16 | Stop reason: ALTCHOICE

## 2019-10-16 RX ORDER — CYCLOBENZAPRINE HCL 5 MG
5 TABLET ORAL DAILY PRN
Qty: 14 TABLET | Refills: 0 | Status: SHIPPED | OUTPATIENT
Start: 2019-10-16 | End: 2021-08-05

## 2019-10-16 NOTE — TELEPHONE ENCOUNTER
Pt requested we change her prescriptions to the [facility] not [facility] I informed pt we would take care of it but please give us time we need to cancel and re send them pt stated today since they were already sent to just leave them at [facility]  Not changing  pharmacies due to pt's request to just leave it

## 2019-10-16 NOTE — PATIENT INSTRUCTIONS
Rest left leg due to sciatica and will give trial of prednisone and mm relaxant  Stay well hydrated and avoid NSAIDS as directed and also lose weight as directed  May take Tylenol for pain prn

## 2019-10-16 NOTE — PROGRESS NOTES
Assessment/Plan:  Chief Complaint   Patient presents with    Knee Pain     Pt is having pain to left leg x3 days  Patient Instructions   Rest left leg due to sciatica and will give trial of prednisone and mm relaxant  Stay well hydrated and avoid NSAIDS as directed and also lose weight as directed  May take Tylenol for pain prn  No problem-specific Assessment & Plan notes found for this encounter  Diagnoses and all orders for this visit:    Sciatica, left side  -     predniSONE 10 mg tablet; Take 60 mg po day#1, 50 mg po day#2, 40 mg po day#3, 30 mg po day#4, 20 mg po day#5m and 10 mg po day#6  -     cyclobenzaprine (FLEXERIL) 5 mg tablet; Take 1 tablet (5 mg total) by mouth daily as needed for muscle spasms (no driving)    CKD (chronic kidney disease) stage 3, GFR 30-59 ml/min ([facility]ly [facility])    Obesity (BMI 30-39  9)          Subjective:      Patient ID: Cydney Salas is a [de-identified] y o  female  Knee Pain (Pt is having pain to left leg x3 days  ) Has left sciatica complaints  Tried calling Dr Alvin Hunter and Dr Kaba Phlegm office and so she called our office  Pt also has obesity and CKD stage 3  Saw Kidney specialist in past  No cp or sob, or ha  The following portions of the patient's history were reviewed and updated as appropriate: allergies, current medications, past family history, past medical history, past social history, past surgical history and problem list     Review of Systems   Constitutional:        Obesity   HENT: Negative  Eyes: Negative  Respiratory: Negative  Cardiovascular: Negative  Gastrointestinal: Negative  Endocrine: Negative  Genitourinary: Negative  Musculoskeletal:        Left sciatica   Skin: Negative  Allergic/Immunologic: Negative  Neurological: Negative  Hematological: Negative  Psychiatric/Behavioral: Negative            Objective:      /78 (BP Location: Left arm, Patient Position: Sitting, Cuff Size: Adult)   Pulse 70   Temp 98 5 °F (36 9 °C) (Temporal)   Ht 4' 11 5" (1 511 m)   Wt 80 3 kg (177 lb)   SpO2 97%   BMI 35 15 kg/m²          Physical Exam   Constitutional: She is oriented to person, place, and time  She appears well-developed and well-nourished  obesity   HENT:   Head: Normocephalic and atraumatic  Right Ear: External ear normal    Left Ear: External ear normal    Nose: Nose normal    Mouth/Throat: Oropharynx is clear and moist    Eyes: Pupils are equal, round, and reactive to light  Conjunctivae and EOM are normal    Neck: Normal range of motion  Neck supple  Cardiovascular: Normal rate, regular rhythm, normal heart sounds and intact distal pulses  Pulmonary/Chest: Effort normal and breath sounds normal    Musculoskeletal: Normal range of motion  Left sciatica   Neurological: She is alert and oriented to person, place, and time  She has normal reflexes  Skin: Skin is warm and dry  Psychiatric: She has a normal mood and affect   Her behavior is normal

## 2019-11-26 ENCOUNTER — HOSPITAL ENCOUNTER (OUTPATIENT)
Dept: NEUROLOGY | Facility: CLINIC | Age: 79
Discharge: HOME | End: 2019-11-26
Payer: MEDICARE

## 2019-11-26 DIAGNOSIS — G62.9 PERIPHERAL POLYNEUROPATHY: ICD-10-CM

## 2019-11-26 PROCEDURE — 95909 NRV CNDJ TST 5-6 STUDIES: CPT | Performed by: PSYCHIATRY & NEUROLOGY

## 2019-11-26 PROCEDURE — 95886 MUSC TEST DONE W/N TEST COMP: CPT | Performed by: PSYCHIATRY & NEUROLOGY

## 2019-12-04 ENCOUNTER — TELEPHONE (OUTPATIENT)
Dept: FAMILY MEDICINE CLINIC | Facility: CLINIC | Age: 79
End: 2019-12-04

## 2019-12-04 NOTE — TELEPHONE ENCOUNTER
Ask [facility] ?? Do they have a suggestion? What insurance?   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

## 2019-12-04 NOTE — TELEPHONE ENCOUNTER
Kiah Pulido from [facility] called and stated the diagnosis used for her EMG is not being covered by insurance (G62 9- Peripheral polyneuropathy)  What other diagnosis code can be used

## 2019-12-04 NOTE — TELEPHONE ENCOUNTER
Radha Pulido emailed me back and stated     "G62 89 looks to be the best code for the dx motor polyneuropathy"    As long as your agreeable I will have her change the code to this and resubmit to her insurance

## 2019-12-04 NOTE — TELEPHONE ENCOUNTER
Spoke with Rc Kothari, she is going to email me a list of DX codes for this type of procedure  Once I receive it I will place on your desk for you to review

## 2019-12-16 ENCOUNTER — OFFICE VISIT (OUTPATIENT)
Dept: FAMILY MEDICINE CLINIC | Facility: CLINIC | Age: 79
End: 2019-12-16
Payer: MEDICARE

## 2019-12-16 ENCOUNTER — TRANSCRIBE ORDERS (OUTPATIENT)
Dept: LAB | Facility: CLINIC | Age: 79
End: 2019-12-16

## 2019-12-16 ENCOUNTER — APPOINTMENT (OUTPATIENT)
Dept: LAB | Facility: CLINIC | Age: 79
End: 2019-12-16
Payer: MEDICARE

## 2019-12-16 VITALS
WEIGHT: 173 LBS | HEIGHT: 60 IN | OXYGEN SATURATION: 8 % | SYSTOLIC BLOOD PRESSURE: 122 MMHG | TEMPERATURE: 98.9 F | HEART RATE: 71 BPM | DIASTOLIC BLOOD PRESSURE: 80 MMHG | BODY MASS INDEX: 33.96 KG/M2

## 2019-12-16 DIAGNOSIS — E55.9 VITAMIN D DEFICIENCY: ICD-10-CM

## 2019-12-16 DIAGNOSIS — R20.2 PARESTHESIA: ICD-10-CM

## 2019-12-16 DIAGNOSIS — M48.061 SPINAL STENOSIS OF LUMBAR REGION, UNSPECIFIED WHETHER NEUROGENIC CLAUDICATION PRESENT: ICD-10-CM

## 2019-12-16 DIAGNOSIS — E03.9 ADULT HYPOTHYROIDISM: ICD-10-CM

## 2019-12-16 DIAGNOSIS — M54.16 LUMBAR RADICULOPATHY: ICD-10-CM

## 2019-12-16 DIAGNOSIS — R73.03 PREDIABETES: ICD-10-CM

## 2019-12-16 DIAGNOSIS — G62.89 AXONAL NEUROPATHY: Primary | ICD-10-CM

## 2019-12-16 DIAGNOSIS — G62.89 AXONAL NEUROPATHY: ICD-10-CM

## 2019-12-16 PROBLEM — N95.0 POSTMENOPAUSAL BLEEDING: Status: ACTIVE | Noted: 2019-12-16

## 2019-12-16 LAB
25(OH)D3 SERPL-MCNC: 32.5 NG/ML (ref 30–100)
ALBUMIN SERPL BCP-MCNC: 5.5 G/DL (ref 3.5–5)
ALP SERPL-CCNC: 74 U/L (ref 46–116)
ALT SERPL W P-5'-P-CCNC: 28 U/L (ref 12–78)
ANION GAP SERPL CALCULATED.3IONS-SCNC: 6 MMOL/L (ref 4–13)
AST SERPL W P-5'-P-CCNC: 16 U/L (ref 5–45)
BILIRUB SERPL-MCNC: 0.41 MG/DL (ref 0.2–1)
BUN SERPL-MCNC: 14 MG/DL (ref 5–25)
CALCIUM SERPL-MCNC: 9.8 MG/DL (ref 8.3–10.1)
CHLORIDE SERPL-SCNC: 109 MMOL/L (ref 100–108)
CO2 SERPL-SCNC: 27 MMOL/L (ref 21–32)
CREAT SERPL-MCNC: 0.89 MG/DL (ref 0.6–1.3)
EST. AVERAGE GLUCOSE BLD GHB EST-MCNC: 111 MG/DL
GFR SERPL CREATININE-BSD FRML MDRD: 61 ML/MIN/1.73SQ M
GLUCOSE P FAST SERPL-MCNC: 91 MG/DL (ref 65–99)
HBA1C MFR BLD: 5.5 % (ref 4.2–6.3)
POTASSIUM SERPL-SCNC: 4.1 MMOL/L (ref 3.5–5.3)
PROT SERPL-MCNC: 7.2 G/DL (ref 6.4–8.2)
SODIUM SERPL-SCNC: 142 MMOL/L (ref 136–145)
TSH SERPL DL<=0.05 MIU/L-ACNC: 2.69 UIU/ML (ref 0.36–3.74)
VIT B12 SERPL-MCNC: 271 PG/ML (ref 100–900)

## 2019-12-16 PROCEDURE — 82607 VITAMIN B-12: CPT

## 2019-12-16 PROCEDURE — 80053 COMPREHEN METABOLIC PANEL: CPT

## 2019-12-16 PROCEDURE — 99214 OFFICE O/P EST MOD 30 MIN: CPT | Performed by: NURSE PRACTITIONER

## 2019-12-16 PROCEDURE — 83036 HEMOGLOBIN GLYCOSYLATED A1C: CPT

## 2019-12-16 PROCEDURE — 82306 VITAMIN D 25 HYDROXY: CPT

## 2019-12-16 PROCEDURE — 84443 ASSAY THYROID STIM HORMONE: CPT

## 2019-12-16 PROCEDURE — 36415 COLL VENOUS BLD VENIPUNCTURE: CPT

## 2019-12-16 NOTE — ASSESSMENT & PLAN NOTE
Referral given for PT so she can change PT locations to one that is closer  Continue with PT and f/u with pain management, if no improvement will be discuss intervention with pain management

## 2019-12-16 NOTE — PATIENT INSTRUCTIONS
Complete lab work, this is fasting  We will call with results  Continue with physical therapy/ pain management  Please call the office if you are experiencing any worsening of symptoms or no symptom improvement  Peripheral Neuropathy   WHAT YOU NEED TO KNOW:   Peripheral neuropathy is a condition that affects how your nerves work  Nerves carry information from your brain to your body  The information does not transfer along your nerves correctly when you have neuropathy  When you have peripheral neuropathy, the nerves in your legs, arms, feet, or hands are affected  It also may affect your organs, such as your lungs, stomach, bladder, or genitals  This condition may go away on its own or you may always have it  DISCHARGE INSTRUCTIONS:   Medicines:   · Pain medicine: You may be given medicine to take away or decrease pain  Do not wait until the pain is severe before you take your medicine  · Antidepressants: This medicine helps to decrease or stop the symptoms of depression  It also used to help decrease pain  Take this medicine as directed  · Antiseizure medicine: This medicine is usually given to control seizures, but it also helps with nerve pain  · Take your medicine as directed  Contact your healthcare provider if you think your medicine is not helping or if you have side effects  Tell him of her if you are allergic to any medicine  Keep a list of the medicines, vitamins, and herbs you take  Include the amounts, and when and why you take them  Bring the list or the pill bottles to follow-up visits  Carry your medicine list with you in case of an emergency  Follow up with your healthcare provider or neurologist as directed:  Write down your questions so you remember to ask them during your visits  Physical therapy:  Physical and occupational therapists may help you exercise your arms, legs, and hands  They may teach you new ways to do things at home    Brace or splint:  You may need a device that supports or holds a body part still  For example, if you have carpal tunnel syndrome, you may need to wear a wrist brace  Manage your peripheral neuropathy:   · Avoid falls: Move with care and stand up slowly  Wear shoes that support your feet, and do not go barefoot  Ask about walking aids, such as a cane or walker  You may want to install railings or nonslip pads in your home, especially in the bathroom  Ask for more information on how to avoid falls  · Check your skin daily:  Sores can form where your skin makes contact with chairs, beds, or other body parts  They also can form under splints  Keep your skin clean, and check your skin daily for sores  · Exercise:  Ask about the best exercise plan for you  Physical activity may increase your balance and strength and may decrease your pain  It is best to start exercising slowly and do more as you get stronger  Contact your healthcare provider or neurologist if:   · Your pain is severe  · You cannot control your bladder  · You have trouble having sex  · You have questions or concerns about your condition or care  Return to the emergency department if:   · You fall  · You cannot walk at all  © 2017 [employer]  Information is for End User's use only and may not be sold, redistributed or otherwise used for commercial purposes  All illustrations and images included in CareNotes® are the copyrighted property of [employer]  or [employer]  The above information is an  only  It is not intended as medical advice for individual conditions or treatments  Talk to your doctor, nurse or pharmacist before following any medical regimen to see if it is safe and effective for you

## 2019-12-16 NOTE — ASSESSMENT & PLAN NOTE
EMG reviewed with patient  Will check B12, TSH, and A1C to rule out potential causes, will follow up with results

## 2019-12-16 NOTE — PROGRESS NOTES
Assessment/Plan:    Prediabetes  5 8% in 2014,  Will recheck to rule out possible cause of neuropathy    Axonal neuropathy  EMG reviewed with patient  Will check B12, TSH, and A1C to rule out potential causes, will follow up with results  Vitamin D deficiency  Will recheck as she stopped supplementation    Lumbar radiculopathy  Referral given for PT so she can change PT locations to one that is closer  Continue with PT and f/u with pain management, if no improvement will be discuss intervention with pain management  Patient verbalizes understand and agrees with treatment plan  Diagnoses and all orders for this visit:    Axonal neuropathy  -     Vitamin B12; Future  -     TSH, 3rd generation with Free T4 reflex; Future  -     Comprehensive metabolic panel; Future  -     Hemoglobin A1C; Future    Prediabetes  -     Hemoglobin A1C; Future    Spinal stenosis of lumbar region, unspecified whether neurogenic claudication present  -     Ambulatory referral to Physical Therapy; Future    Vitamin D deficiency  -     Vitamin D 25 hydroxy; Future    Lumbar radiculopathy  -     Ambulatory referral to Physical Therapy; Future    Adult hypothyroidism  -     TSH, 3rd generation with Free T4 reflex; Future    Paresthesia  -     Vitamin B12; Future    Other orders  -     cannabidiol (EPIDIOLEX) 100 mg/ml SOLN; Take by mouth                Subjective:        Patient ID: Ankita Simmons is a [de-identified] y o  female  Chief Complaint   Patient presents with    Follow-up     discuss EMG results       Patient presents to office today for review of her EMG results  This was completed on November 26, 2019  These results were abnormal indicating generalize axonal polyneuropathy  Possible causes including diabetes abnormal thyroid dysfunction and low vitamin B12  Patient does have history of hypothyroid disorder  Her last TSH was completed in February 2019  Patient has never had her vitamin B12 checked    Recent fasting glucose on lab work has been normal but back in 2014 she was prediabetic with an A1c of 5 8%  Patient is currently participating in physical therapy and sees pain management for her chronic back pain  She states the neuropathy she experiences and the back pain are on related to each other  The following portions of the patient's history were reviewed and updated as appropriate: allergies, current medications, past family history, past social history and problem list     Review of Systems   Constitutional: Negative for chills and fever  Eyes: Negative for discharge  Respiratory: Negative for shortness of breath  Cardiovascular: Negative for chest pain  Gastrointestinal: Negative for constipation and diarrhea  Genitourinary: Negative for difficulty urinating  Musculoskeletal: Positive for back pain  Negative for joint swelling  Skin: Negative for rash  Neurological: Positive for numbness  Negative for headaches  Hematological: Negative for adenopathy  Psychiatric/Behavioral: The patient is not nervous/anxious  Objective:  /80 (BP Location: Left arm, Patient Position: Sitting, Cuff Size: Large)   Pulse 71   Temp 98 9 °F (37 2 °C) (Temporal)   Ht 4' 11 5" (1 511 m)   Wt 78 5 kg (173 lb)   SpO2 (!) 8%   BMI 34 36 kg/m²      Physical Exam   Constitutional: She is oriented to person, place, and time  She appears well-developed  No distress  Obese   HENT:   Head: Normocephalic and atraumatic  Right Ear: External ear normal    Left Ear: External ear normal    Eyes: Conjunctivae and lids are normal  Right eye exhibits no discharge  Left eye exhibits no discharge  Neck: Neck supple  Cardiovascular: Normal rate and regular rhythm  No murmur heard  Pulmonary/Chest: Effort normal and breath sounds normal  No respiratory distress  She has no wheezes  Musculoskeletal: She exhibits no deformity  Neurological: She is alert and oriented to person, place, and time   Coordination normal    Skin: Skin is warm and dry  She is not diaphoretic  Psychiatric: She has a normal mood and affect  Her speech is normal and behavior is normal  Judgment and thought content normal  Cognition and memory are normal    Nursing note and vitals reviewed  Current Outpatient Medications:     albuterol (PROAIR HFA) 90 mcg/act inhaler, Inhale 2 puffs every 6 (six) hours as needed for wheezing or shortness of breath, Disp: 1 Inhaler, Rfl: 1    bimatoprost (LUMIGAN) 0 01 % ophthalmic drops, Administer 1 drop to both eyes daily at bedtime  , Disp: , Rfl:     cannabidiol (EPIDIOLEX) 100 mg/ml SOLN, Take by mouth, Disp: , Rfl:     Cholecalciferol (VITAMIN D) 2000 units CAPS, Take 1 tablet by mouth daily, Disp: , Rfl:     cyclobenzaprine (FLEXERIL) 5 mg tablet, Take 1 tablet (5 mg total) by mouth daily as needed for muscle spasms (no driving), Disp: 14 tablet, Rfl: 0    levothyroxine 50 mcg tablet, TAKE 1 TABLET(50 MCG) BY MOUTH DAILY, Disp: 90 tablet, Rfl: 3    Multiple Vitamins-Minerals (MULTIVITAMIN ADULT PO), Take by mouth daily, Disp: , Rfl:     pravastatin (PRAVACHOL) 20 mg tablet, Take 1 tablet (20 mg total) by mouth daily, Disp: 90 tablet, Rfl: 3    acetaminophen-codeine (TYLENOL #3) 300-30 mg per tablet, Take 1 tablet by mouth every 4 to 6 hours as needed for pain, Disp: , Rfl: 0    Ascorbic Acid (VITAMIN C) 100 MG tablet, Take 100 mg by mouth daily  , Disp: , Rfl:   Allergies   Allergen Reactions    Molds & Smuts Shortness Of Breath    Penicillins Hives, Rash and Edema     Category:  Allergy;     Mold Extract [Trichophyton]

## 2019-12-23 ENCOUNTER — CLINICAL SUPPORT (OUTPATIENT)
Dept: FAMILY MEDICINE CLINIC | Facility: CLINIC | Age: 79
End: 2019-12-23
Payer: MEDICARE

## 2019-12-23 DIAGNOSIS — E53.8 VITAMIN B12 DEFICIENCY: Primary | ICD-10-CM

## 2019-12-23 PROCEDURE — 96372 THER/PROPH/DIAG INJ SC/IM: CPT | Performed by: FAMILY MEDICINE

## 2019-12-23 RX ORDER — CYANOCOBALAMIN 1000 UG/ML
1000 INJECTION INTRAMUSCULAR; SUBCUTANEOUS
Status: DISCONTINUED | OUTPATIENT
Start: 2019-12-23 | End: 2021-02-04

## 2019-12-23 RX ADMIN — CYANOCOBALAMIN 1000 MCG: 1000 INJECTION INTRAMUSCULAR; SUBCUTANEOUS at 12:35

## 2019-12-30 ENCOUNTER — CLINICAL SUPPORT (OUTPATIENT)
Dept: FAMILY MEDICINE CLINIC | Facility: CLINIC | Age: 79
End: 2019-12-30
Payer: MEDICARE

## 2019-12-30 DIAGNOSIS — E53.8 VITAMIN B12 DEFICIENCY: Primary | ICD-10-CM

## 2019-12-30 PROCEDURE — 96372 THER/PROPH/DIAG INJ SC/IM: CPT | Performed by: FAMILY MEDICINE

## 2019-12-30 RX ADMIN — CYANOCOBALAMIN 1000 MCG: 1000 INJECTION INTRAMUSCULAR; SUBCUTANEOUS at 10:26

## 2020-01-06 ENCOUNTER — CLINICAL SUPPORT (OUTPATIENT)
Dept: FAMILY MEDICINE CLINIC | Facility: CLINIC | Age: 80
End: 2020-01-06
Payer: MEDICARE

## 2020-01-06 DIAGNOSIS — E53.8 B12 DEFICIENCY: Primary | ICD-10-CM

## 2020-01-06 PROCEDURE — 96372 THER/PROPH/DIAG INJ SC/IM: CPT | Performed by: FAMILY MEDICINE

## 2020-01-06 RX ADMIN — CYANOCOBALAMIN 1000 MCG: 1000 INJECTION INTRAMUSCULAR; SUBCUTANEOUS at 10:10

## 2020-01-14 ENCOUNTER — CLINICAL SUPPORT (OUTPATIENT)
Dept: FAMILY MEDICINE CLINIC | Facility: CLINIC | Age: 80
End: 2020-01-14
Payer: MEDICARE

## 2020-01-14 DIAGNOSIS — E53.8 B12 DEFICIENCY: Primary | ICD-10-CM

## 2020-01-14 PROCEDURE — 96372 THER/PROPH/DIAG INJ SC/IM: CPT | Performed by: FAMILY MEDICINE

## 2020-01-14 RX ADMIN — CYANOCOBALAMIN 1000 MCG: 1000 INJECTION INTRAMUSCULAR; SUBCUTANEOUS at 09:34

## 2020-01-22 ENCOUNTER — TELEPHONE (OUTPATIENT)
Dept: FAMILY MEDICINE CLINIC | Facility: CLINIC | Age: 80
End: 2020-01-22

## 2020-01-22 ENCOUNTER — TRANSCRIBE ORDERS (OUTPATIENT)
Dept: ADMINISTRATIVE | Facility: HOSPITAL | Age: 80
End: 2020-01-22

## 2020-01-22 DIAGNOSIS — Z12.31 SCREENING MAMMOGRAM, ENCOUNTER FOR: Primary | ICD-10-CM

## 2020-01-22 DIAGNOSIS — N18.30 CHRONIC KIDNEY DISEASE, STAGE III (MODERATE) (HCC): ICD-10-CM

## 2020-01-22 DIAGNOSIS — E55.9 VITAMIN D DEFICIENCY: ICD-10-CM

## 2020-01-22 DIAGNOSIS — R60.0 LOWER EXTREMITY EDEMA: ICD-10-CM

## 2020-01-22 DIAGNOSIS — E03.9 HYPOTHYROIDISM, UNSPECIFIED TYPE: ICD-10-CM

## 2020-01-22 DIAGNOSIS — I12.9 HYPERTENSIVE RENAL DISEASE, STAGE 1 THROUGH STAGE 4 OR UNSPECIFIED CHRONIC KIDNEY DISEASE: ICD-10-CM

## 2020-01-22 DIAGNOSIS — R60.9 EDEMA, UNSPECIFIED TYPE: ICD-10-CM

## 2020-01-22 DIAGNOSIS — E66.9 OBESITY, UNSPECIFIED CLASSIFICATION, UNSPECIFIED OBESITY TYPE, UNSPECIFIED WHETHER SERIOUS COMORBIDITY PRESENT: ICD-10-CM

## 2020-01-22 DIAGNOSIS — E78.5 HYPERLIPIDEMIA, UNSPECIFIED HYPERLIPIDEMIA TYPE: ICD-10-CM

## 2020-01-22 NOTE — TELEPHONE ENCOUNTER
Aurora Conde called the office requesting we please mail her mammogram script to her home pt has a mammogram scheduled this March informed pt we will place it out in the mail for her today

## 2020-02-03 ENCOUNTER — LAB (OUTPATIENT)
Dept: LAB | Facility: CLINIC | Age: 80
End: 2020-02-03
Payer: MEDICARE

## 2020-02-03 DIAGNOSIS — R60.9 EDEMA, UNSPECIFIED TYPE: ICD-10-CM

## 2020-02-03 DIAGNOSIS — E78.5 HYPERLIPIDEMIA, UNSPECIFIED HYPERLIPIDEMIA TYPE: ICD-10-CM

## 2020-02-03 DIAGNOSIS — E66.9 OBESITY, UNSPECIFIED CLASSIFICATION, UNSPECIFIED OBESITY TYPE, UNSPECIFIED WHETHER SERIOUS COMORBIDITY PRESENT: ICD-10-CM

## 2020-02-03 DIAGNOSIS — E03.9 HYPOTHYROIDISM, UNSPECIFIED TYPE: ICD-10-CM

## 2020-02-03 DIAGNOSIS — I12.9 HYPERTENSIVE RENAL DISEASE, STAGE 1 THROUGH STAGE 4 OR UNSPECIFIED CHRONIC KIDNEY DISEASE: ICD-10-CM

## 2020-02-03 DIAGNOSIS — E55.9 VITAMIN D DEFICIENCY: ICD-10-CM

## 2020-02-03 DIAGNOSIS — E03.9 ADULT HYPOTHYROIDISM: ICD-10-CM

## 2020-02-03 DIAGNOSIS — R60.0 LOWER EXTREMITY EDEMA: ICD-10-CM

## 2020-02-03 DIAGNOSIS — N18.30 CHRONIC KIDNEY DISEASE, STAGE III (MODERATE) (HCC): ICD-10-CM

## 2020-02-03 DIAGNOSIS — Z12.31 SCREENING MAMMOGRAM, ENCOUNTER FOR: ICD-10-CM

## 2020-02-03 LAB
25(OH)D3 SERPL-MCNC: 29.3 NG/ML (ref 30–100)
ALBUMIN SERPL BCP-MCNC: 3.7 G/DL (ref 3.5–5)
ANION GAP SERPL CALCULATED.3IONS-SCNC: 8 MMOL/L (ref 4–13)
BACTERIA UR QL AUTO: NORMAL /HPF
BILIRUB UR QL STRIP: NEGATIVE
BUN SERPL-MCNC: 16 MG/DL (ref 5–25)
CALCIUM SERPL-MCNC: 9.6 MG/DL (ref 8.3–10.1)
CHLORIDE SERPL-SCNC: 107 MMOL/L (ref 100–108)
CLARITY UR: CLEAR
CO2 SERPL-SCNC: 25 MMOL/L (ref 21–32)
COLOR UR: YELLOW
CREAT SERPL-MCNC: 0.86 MG/DL (ref 0.6–1.3)
CREAT UR-MCNC: 50.1 MG/DL
ERYTHROCYTE [DISTWIDTH] IN BLOOD BY AUTOMATED COUNT: 13.6 % (ref 11.6–15.1)
GFR SERPL CREATININE-BSD FRML MDRD: 64 ML/MIN/1.73SQ M
GLUCOSE P FAST SERPL-MCNC: 79 MG/DL (ref 65–99)
GLUCOSE UR STRIP-MCNC: NEGATIVE MG/DL
HCT VFR BLD AUTO: 42.1 % (ref 34.8–46.1)
HGB BLD-MCNC: 13.1 G/DL (ref 11.5–15.4)
HGB UR QL STRIP.AUTO: NEGATIVE
HYALINE CASTS #/AREA URNS LPF: NORMAL /LPF
KETONES UR STRIP-MCNC: NEGATIVE MG/DL
LEUKOCYTE ESTERASE UR QL STRIP: NEGATIVE
MAGNESIUM SERPL-MCNC: 2.6 MG/DL (ref 1.6–2.6)
MCH RBC QN AUTO: 29.2 PG (ref 26.8–34.3)
MCHC RBC AUTO-ENTMCNC: 31.1 G/DL (ref 31.4–37.4)
MCV RBC AUTO: 94 FL (ref 82–98)
NITRITE UR QL STRIP: NEGATIVE
NON-SQ EPI CELLS URNS QL MICRO: NORMAL /HPF
PH UR STRIP.AUTO: 7 [PH]
PHOSPHATE SERPL-MCNC: 3.4 MG/DL (ref 2.3–4.1)
PLATELET # BLD AUTO: 241 THOUSANDS/UL (ref 149–390)
PMV BLD AUTO: 10.1 FL (ref 8.9–12.7)
POTASSIUM SERPL-SCNC: 4.2 MMOL/L (ref 3.5–5.3)
PROT UR STRIP-MCNC: NEGATIVE MG/DL
PROT UR-MCNC: 7 MG/DL
PROT/CREAT UR: 0.14 MG/G{CREAT} (ref 0–0.1)
PTH-INTACT SERPL-MCNC: 58.6 PG/ML (ref 18.4–80.1)
RBC # BLD AUTO: 4.48 MILLION/UL (ref 3.81–5.12)
RBC #/AREA URNS AUTO: NORMAL /HPF
SODIUM SERPL-SCNC: 140 MMOL/L (ref 136–145)
SP GR UR STRIP.AUTO: 1.01 (ref 1–1.03)
T3FREE SERPL-MCNC: 2.04 PG/ML (ref 2.3–4.2)
T4 FREE SERPL-MCNC: 1.18 NG/DL (ref 0.76–1.46)
TSH SERPL DL<=0.05 MIU/L-ACNC: 3.03 UIU/ML (ref 0.36–3.74)
URATE SERPL-MCNC: 3.5 MG/DL (ref 2–6.8)
UROBILINOGEN UR QL STRIP.AUTO: 0.2 E.U./DL
WBC # BLD AUTO: 6.65 THOUSAND/UL (ref 4.31–10.16)
WBC #/AREA URNS AUTO: NORMAL /HPF

## 2020-02-03 PROCEDURE — 82570 ASSAY OF URINE CREATININE: CPT | Performed by: FAMILY MEDICINE

## 2020-02-03 PROCEDURE — 84156 ASSAY OF PROTEIN URINE: CPT | Performed by: FAMILY MEDICINE

## 2020-02-03 PROCEDURE — 82306 VITAMIN D 25 HYDROXY: CPT

## 2020-02-03 PROCEDURE — 83970 ASSAY OF PARATHORMONE: CPT

## 2020-02-03 PROCEDURE — 84443 ASSAY THYROID STIM HORMONE: CPT

## 2020-02-03 PROCEDURE — 84439 ASSAY OF FREE THYROXINE: CPT

## 2020-02-03 PROCEDURE — 85027 COMPLETE CBC AUTOMATED: CPT

## 2020-02-03 PROCEDURE — 36415 COLL VENOUS BLD VENIPUNCTURE: CPT

## 2020-02-03 PROCEDURE — 80069 RENAL FUNCTION PANEL: CPT

## 2020-02-03 PROCEDURE — 84550 ASSAY OF BLOOD/URIC ACID: CPT

## 2020-02-03 PROCEDURE — 84481 FREE ASSAY (FT-3): CPT

## 2020-02-03 PROCEDURE — 81001 URINALYSIS AUTO W/SCOPE: CPT | Performed by: FAMILY MEDICINE

## 2020-02-03 PROCEDURE — 83735 ASSAY OF MAGNESIUM: CPT

## 2020-02-13 ENCOUNTER — CLINICAL SUPPORT (OUTPATIENT)
Dept: FAMILY MEDICINE CLINIC | Facility: CLINIC | Age: 80
End: 2020-02-13
Payer: MEDICARE

## 2020-02-13 DIAGNOSIS — E53.8 B12 DEFICIENCY: Primary | ICD-10-CM

## 2020-02-13 PROCEDURE — 96372 THER/PROPH/DIAG INJ SC/IM: CPT | Performed by: FAMILY MEDICINE

## 2020-02-13 RX ADMIN — CYANOCOBALAMIN 1000 MCG: 1000 INJECTION INTRAMUSCULAR; SUBCUTANEOUS at 09:18

## 2020-02-28 DIAGNOSIS — E78.49 OTHER HYPERLIPIDEMIA: ICD-10-CM

## 2020-02-28 RX ORDER — PRAVASTATIN SODIUM 20 MG
TABLET ORAL
Qty: 90 TABLET | Refills: 3 | Status: SHIPPED | OUTPATIENT
Start: 2020-02-28 | End: 2020-09-01 | Stop reason: SDUPTHER

## 2020-03-04 ENCOUNTER — OFFICE VISIT (OUTPATIENT)
Dept: FAMILY MEDICINE CLINIC | Facility: CLINIC | Age: 80
End: 2020-03-04
Payer: MEDICARE

## 2020-03-04 VITALS
HEART RATE: 60 BPM | OXYGEN SATURATION: 98 % | WEIGHT: 172.8 LBS | RESPIRATION RATE: 16 BRPM | SYSTOLIC BLOOD PRESSURE: 142 MMHG | HEIGHT: 60 IN | TEMPERATURE: 98.5 F | BODY MASS INDEX: 33.92 KG/M2 | DIASTOLIC BLOOD PRESSURE: 78 MMHG

## 2020-03-04 DIAGNOSIS — E53.8 B12 DEFICIENCY: Primary | ICD-10-CM

## 2020-03-04 DIAGNOSIS — E03.9 ADULT HYPOTHYROIDISM: ICD-10-CM

## 2020-03-04 PROCEDURE — 1036F TOBACCO NON-USER: CPT | Performed by: FAMILY MEDICINE

## 2020-03-04 PROCEDURE — 96372 THER/PROPH/DIAG INJ SC/IM: CPT | Performed by: FAMILY MEDICINE

## 2020-03-04 PROCEDURE — 4040F PNEUMOC VAC/ADMIN/RCVD: CPT | Performed by: FAMILY MEDICINE

## 2020-03-04 PROCEDURE — 99213 OFFICE O/P EST LOW 20 MIN: CPT | Performed by: FAMILY MEDICINE

## 2020-03-04 PROCEDURE — 1160F RVW MEDS BY RX/DR IN RCRD: CPT | Performed by: FAMILY MEDICINE

## 2020-03-04 RX ORDER — CYANOCOBALAMIN 1000 UG/ML
1000 INJECTION INTRAMUSCULAR; SUBCUTANEOUS
Status: DISCONTINUED | OUTPATIENT
Start: 2020-03-04 | End: 2021-02-04

## 2020-03-04 RX ADMIN — CYANOCOBALAMIN 1000 MCG: 1000 INJECTION INTRAMUSCULAR; SUBCUTANEOUS at 11:16

## 2020-03-04 NOTE — PROGRESS NOTES
Assessment/Plan:     Diagnoses and all orders for this visit:    B12 deficiency  -     cyanocobalamin injection 1,000 mcg  -     Vitamin B12; Future    Adult hypothyroidism          Subjective:   Chief Complaint   Patient presents with    Follow-up     6 month follow up and B12 injection       Patient ID: Cayla Brady is a [de-identified] y o  female  Patient is a pleasant 26-year-old female who is here for follow-up  She is doing generally okay  She is up-to-date on her nephrology follow-up  She is compliant with her medication  She is up-to-date with her screening mammogram   She is receiving B12 supplementation  She has her normal follow-up with gyn  She has had no asthma flare  Back symptoms are stable  Attempting dietary compliance  Hemoglobin A1c in December was 5 5%  Lipids in summer with slightly elevated total and LDL but strides in HDL excellent    The following portions of the patient's history were reviewed and updated as appropriate: allergies, current medications, past family history, past medical history, past social history, past surgical history and problem list       Review of Systems   Constitutional: Negative for fatigue and fever  HENT: Dental problem:  dental up-to-date  Eyes: Visual disturbance:  checkup up-to-date  Respiratory: Negative for shortness of breath  Cardiovascular: Negative  Genitourinary: Negative  Musculoskeletal: Positive for arthralgias and back pain  Psychiatric/Behavioral: Negative for sleep disturbance  Dysphoric mood:  improved  The patient is not nervous/anxious  Objective:  Recent labs done February 3rd  Vitamin-D slightly low  BUN and creatinine normal       /78   Pulse 60   Temp 98 5 °F (36 9 °C) (Temporal)   Resp 16   Ht 5' (1 524 m)   Wt 78 4 kg (172 lb 12 8 oz)   SpO2 98%   BMI 33 75 kg/m²          Physical Exam   Constitutional: She is oriented to person, place, and time  She appears well-developed and well-nourished  No distress  23-year-old female who looks younger than her stated age with a BMI of 33%   HENT:   Head: Normocephalic  Eyes: Pupils are equal, round, and reactive to light  EOM are normal    Neck: Normal range of motion  Neck supple  Cardiovascular: Normal rate, regular rhythm, normal heart sounds and intact distal pulses  No murmur heard  Pulmonary/Chest: Effort normal and breath sounds normal    Abdominal: Soft  Bowel sounds are normal  She exhibits no mass  There is no tenderness  Musculoskeletal: Normal range of motion  She exhibits no edema  Neurological: She is alert and oriented to person, place, and time  She has normal reflexes  Skin: Skin is warm and dry  Psychiatric: She has a normal mood and affect  Her behavior is normal  Judgment and thought content normal    Vitals reviewed

## 2020-06-12 ENCOUNTER — HOSPITAL ENCOUNTER (OUTPATIENT)
Dept: MAMMOGRAPHY | Facility: MEDICAL CENTER | Age: 80
Discharge: HOME | End: 2020-06-12
Payer: MEDICARE

## 2020-06-12 VITALS — WEIGHT: 172 LBS | HEIGHT: 60 IN | BODY MASS INDEX: 33.77 KG/M2

## 2020-06-12 DIAGNOSIS — Z12.31 SCREENING MAMMOGRAM, ENCOUNTER FOR: ICD-10-CM

## 2020-06-12 PROCEDURE — 77063 BREAST TOMOSYNTHESIS BI: CPT

## 2020-06-12 PROCEDURE — 77067 SCR MAMMO BI INCL CAD: CPT

## 2020-06-17 ENCOUNTER — APPOINTMENT (OUTPATIENT)
Dept: LAB | Facility: CLINIC | Age: 80
End: 2020-06-17
Payer: MEDICARE

## 2020-06-17 DIAGNOSIS — E53.8 B12 DEFICIENCY: ICD-10-CM

## 2020-06-17 LAB — VIT B12 SERPL-MCNC: 356 PG/ML (ref 100–900)

## 2020-06-17 PROCEDURE — 82607 VITAMIN B-12: CPT

## 2020-06-17 PROCEDURE — 36415 COLL VENOUS BLD VENIPUNCTURE: CPT

## 2020-06-18 ENCOUNTER — TELEPHONE (OUTPATIENT)
Dept: FAMILY MEDICINE CLINIC | Facility: CLINIC | Age: 80
End: 2020-06-18

## 2020-07-21 ENCOUNTER — APPOINTMENT (OUTPATIENT)
Dept: LAB | Facility: CLINIC | Age: 80
End: 2020-07-21
Payer: MEDICARE

## 2020-07-21 ENCOUNTER — TRANSCRIBE ORDERS (OUTPATIENT)
Dept: LAB | Facility: CLINIC | Age: 80
End: 2020-07-21

## 2020-07-21 DIAGNOSIS — N18.30 CHRONIC KIDNEY DISEASE, STAGE III (MODERATE) (HCC): ICD-10-CM

## 2020-07-21 DIAGNOSIS — N18.30 CHRONIC KIDNEY DISEASE, STAGE III (MODERATE) (HCC): Primary | ICD-10-CM

## 2020-07-21 LAB
ALBUMIN SERPL BCP-MCNC: 3.5 G/DL (ref 3.5–5)
ANION GAP SERPL CALCULATED.3IONS-SCNC: 5 MMOL/L (ref 4–13)
BACTERIA UR QL AUTO: ABNORMAL /HPF
BILIRUB UR QL STRIP: NEGATIVE
BUN SERPL-MCNC: 15 MG/DL (ref 5–25)
CALCIUM SERPL-MCNC: 9.1 MG/DL (ref 8.3–10.1)
CHLORIDE SERPL-SCNC: 107 MMOL/L (ref 100–108)
CLARITY UR: CLEAR
CO2 SERPL-SCNC: 27 MMOL/L (ref 21–32)
COLOR UR: YELLOW
CREAT SERPL-MCNC: 0.88 MG/DL (ref 0.6–1.3)
CREAT UR-MCNC: 63.4 MG/DL
GFR SERPL CREATININE-BSD FRML MDRD: 62 ML/MIN/1.73SQ M
GLUCOSE P FAST SERPL-MCNC: 91 MG/DL (ref 65–99)
GLUCOSE UR STRIP-MCNC: NEGATIVE MG/DL
HGB UR QL STRIP.AUTO: NEGATIVE
HYALINE CASTS #/AREA URNS LPF: ABNORMAL /LPF
KETONES UR STRIP-MCNC: NEGATIVE MG/DL
LEUKOCYTE ESTERASE UR QL STRIP: ABNORMAL
MAGNESIUM SERPL-MCNC: 2.6 MG/DL (ref 1.6–2.6)
NITRITE UR QL STRIP: NEGATIVE
NON-SQ EPI CELLS URNS QL MICRO: ABNORMAL /HPF
PH UR STRIP.AUTO: 6 [PH]
PHOSPHATE SERPL-MCNC: 3.4 MG/DL (ref 2.3–4.1)
POTASSIUM SERPL-SCNC: 4.3 MMOL/L (ref 3.5–5.3)
PROT UR STRIP-MCNC: NEGATIVE MG/DL
PROT UR-MCNC: 7 MG/DL
PROT/CREAT UR: 0.11 MG/G{CREAT} (ref 0–0.1)
RBC #/AREA URNS AUTO: ABNORMAL /HPF
SODIUM SERPL-SCNC: 139 MMOL/L (ref 136–145)
SP GR UR STRIP.AUTO: 1.01 (ref 1–1.03)
UROBILINOGEN UR QL STRIP.AUTO: 0.2 E.U./DL
WBC #/AREA URNS AUTO: ABNORMAL /HPF

## 2020-07-21 PROCEDURE — 82570 ASSAY OF URINE CREATININE: CPT

## 2020-07-21 PROCEDURE — 36415 COLL VENOUS BLD VENIPUNCTURE: CPT

## 2020-07-21 PROCEDURE — 84156 ASSAY OF PROTEIN URINE: CPT

## 2020-07-21 PROCEDURE — 80069 RENAL FUNCTION PANEL: CPT

## 2020-07-21 PROCEDURE — 81001 URINALYSIS AUTO W/SCOPE: CPT

## 2020-07-21 PROCEDURE — 83735 ASSAY OF MAGNESIUM: CPT

## 2020-07-30 ENCOUNTER — OFFICE VISIT (OUTPATIENT)
Dept: FAMILY MEDICINE CLINIC | Facility: CLINIC | Age: 80
End: 2020-07-30
Payer: MEDICARE

## 2020-07-30 VITALS
OXYGEN SATURATION: 97 % | SYSTOLIC BLOOD PRESSURE: 132 MMHG | BODY MASS INDEX: 34.99 KG/M2 | RESPIRATION RATE: 16 BRPM | WEIGHT: 178.2 LBS | HEIGHT: 60 IN | TEMPERATURE: 98.2 F | HEART RATE: 68 BPM | DIASTOLIC BLOOD PRESSURE: 68 MMHG

## 2020-07-30 DIAGNOSIS — M54.16 LUMBAR RADICULOPATHY: ICD-10-CM

## 2020-07-30 DIAGNOSIS — M48.061 SPINAL STENOSIS OF LUMBAR REGION, UNSPECIFIED WHETHER NEUROGENIC CLAUDICATION PRESENT: ICD-10-CM

## 2020-07-30 DIAGNOSIS — E03.9 ADULT HYPOTHYROIDISM: ICD-10-CM

## 2020-07-30 DIAGNOSIS — Z00.00 MEDICARE ANNUAL WELLNESS VISIT, SUBSEQUENT: Primary | ICD-10-CM

## 2020-07-30 PROCEDURE — 1123F ACP DISCUSS/DSCN MKR DOCD: CPT | Performed by: FAMILY MEDICINE

## 2020-07-30 PROCEDURE — 1036F TOBACCO NON-USER: CPT | Performed by: FAMILY MEDICINE

## 2020-07-30 PROCEDURE — 4040F PNEUMOC VAC/ADMIN/RCVD: CPT | Performed by: FAMILY MEDICINE

## 2020-07-30 PROCEDURE — G0439 PPPS, SUBSEQ VISIT: HCPCS | Performed by: FAMILY MEDICINE

## 2020-07-30 PROCEDURE — 1160F RVW MEDS BY RX/DR IN RCRD: CPT | Performed by: FAMILY MEDICINE

## 2020-07-30 PROCEDURE — 1125F AMNT PAIN NOTED PAIN PRSNT: CPT | Performed by: FAMILY MEDICINE

## 2020-07-30 PROCEDURE — 1170F FXNL STATUS ASSESSED: CPT | Performed by: FAMILY MEDICINE

## 2020-07-30 RX ORDER — PROPRANOLOL/HYDROCHLOROTHIAZID 40 MG-25MG
1500 TABLET ORAL DAILY
COMMUNITY
End: 2021-08-05

## 2020-07-30 RX ORDER — LEVOTHYROXINE SODIUM 0.05 MG/1
50 TABLET ORAL DAILY
Qty: 90 TABLET | Refills: 3 | Status: SHIPPED | OUTPATIENT
Start: 2020-07-30 | End: 2021-04-08

## 2020-07-30 NOTE — PATIENT INSTRUCTIONS
Medicare Preventive Visit Patient Instructions  Thank you for completing your Welcome to Medicare Visit or Medicare Annual Wellness Visit today  Your next wellness visit will be due in one year (7/30/2021)  The screening/preventive services that you may require over the next 5-10 years are detailed below  Some tests may not apply to you based off risk factors and/or age  Screening tests ordered at today's visit but not completed yet may show as past due  Also, please note that scanned in results may not display below  Preventive Screenings:  Service Recommendations Previous Testing/Comments   Colorectal Cancer Screening  * Colonoscopy    * Fecal Occult Blood Test (FOBT)/Fecal Immunochemical Test (FIT)  * Fecal DNA/Cologuard Test  * Flexible Sigmoidoscopy Age: 54-65 years old   Colonoscopy: every 10 years (may be performed more frequently if at higher risk)  OR  FOBT/FIT: every 1 year  OR  Cologuard: every 3 years  OR  Sigmoidoscopy: every 5 years  Screening may be recommended earlier than age 48 if at higher risk for colorectal cancer  Also, an individualized decision between you and your healthcare provider will decide whether screening between the ages of 74-80 would be appropriate  Colonoscopy: Not on file  FOBT/FIT: Not on file  Cologuard: Not on file  Sigmoidoscopy: Not on file         Breast Cancer Screening Age: 36 years old  Frequency: every 1-2 years  Not required if history of left and right mastectomy Mammogram: 06/12/2020    Screening Current   Cervical Cancer Screening Between the ages of 21-29, pap smear recommended once every 3 years  Between the ages of 33-67, can perform pap smear with HPV co-testing every 5 years     Recommendations may differ for women with a history of total hysterectomy, cervical cancer, or abnormal pap smears in past  Pap Smear: Not on file    Screening Not Indicated   Hepatitis C Screening Once for adults born between 1945 and 1965  More frequently in patients at high risk for Hepatitis C Hep C Antibody: Not on file       Diabetes Screening 1-2 times per year if you're at risk for diabetes or have pre-diabetes Fasting glucose: 91 mg/dL   A1C: 5 5 %    Screening Current   Cholesterol Screening Once every 5 years if you don't have a lipid disorder  May order more often based on risk factors  Lipid panel: 08/06/2019    Screening Not Indicated  History Lipid Disorder     Other Preventive Screenings Covered by Medicare:  1  Abdominal Aortic Aneurysm (AAA) Screening: covered once if your at risk  You're considered to be at risk if you have a family history of AAA  2  Lung Cancer Screening: covers low dose CT scan once per year if you meet all of the following conditions: (1) Age 50-69; (2) No signs or symptoms of lung cancer; (3) Current smoker or have quit smoking within the last 15 years; (4) You have a tobacco smoking history of at least 30 pack years (packs per day multiplied by number of years you smoked); (5) You get a written order from a healthcare provider  3  Glaucoma Screening: covered annually if you're considered high risk: (1) You have diabetes OR (2) Family history of glaucoma OR (3)  aged 48 and older OR (3)  American aged 72 and older  3  Osteoporosis Screening: covered every 2 years if you meet one of the following conditions: (1) You're estrogen deficient and at risk for osteoporosis based off medical history and other findings; (2) Have a vertebral abnormality; (3) On glucocorticoid therapy for more than 3 months; (4) Have primary hyperparathyroidism; (5) On osteoporosis medications and need to assess response to drug therapy  · Last bone density test (DXA Scan): 01/15/2019   5  HIV Screening: covered annually if you're between the age of 15-65  Also covered annually if you are younger than 13 and older than 72 with risk factors for HIV infection   For pregnant patients, it is covered up to 3 times per pregnancy  Immunizations:  Immunization Recommendations   Influenza Vaccine Annual influenza vaccination during flu season is recommended for all persons aged >= 6 months who do not have contraindications   Pneumococcal Vaccine (Prevnar and Pneumovax)  * Prevnar = PCV13  * Pneumovax = PPSV23   Adults 25-60 years old: 1-3 doses may be recommended based on certain risk factors  Adults 72 years old: Prevnar (PCV13) vaccine recommended followed by Pneumovax (PPSV23) vaccine  If already received PPSV23 since turning 65, then PCV13 recommended at least one year after PPSV23 dose  Hepatitis B Vaccine 3 dose series if at intermediate or high risk (ex: diabetes, end stage renal disease, liver disease)   Tetanus (Td) Vaccine - COST NOT COVERED BY MEDICARE PART B Following completion of primary series, a booster dose should be given every 10 years to maintain immunity against tetanus  Td may also be given as tetanus wound prophylaxis  Tdap Vaccine - COST NOT COVERED BY MEDICARE PART B Recommended at least once for all adults  For pregnant patients, recommended with each pregnancy  Shingles Vaccine (Shingrix) - COST NOT COVERED BY MEDICARE PART B  2 shot series recommended in those aged 48 and above     Health Maintenance Due:  There are no preventive care reminders to display for this patient  Immunizations Due:      Topic Date Due    Influenza Vaccine  07/01/2020     Advance Directives   What are advance directives? Advance directives are legal documents that state your wishes and plans for medical care  These plans are made ahead of time in case you lose your ability to make decisions for yourself  Advance directives can apply to any medical decision, such as the treatments you want, and if you want to donate organs  What are the types of advance directives? There are many types of advance directives, and each state has rules about how to use them   You may choose a combination of any of the following:  · Living will: This is a written record of the treatment you want  You can also choose which treatments you do not want, which to limit, and which to stop at a certain time  This includes surgery, medicine, IV fluid, and tube feedings  · Durable power of  for healthcare [facility]): This is a written record that states who you want to make healthcare choices for you when you are unable to make them for yourself  This person, called a proxy, is usually a family member or a friend  You may choose more than 1 proxy  · Do not resuscitate (DNR) order:  A DNR order is used in case your heart stops beating or you stop breathing  It is a request not to have certain forms of treatment, such as CPR  A DNR order may be included in other types of advance directives  · Medical directive: This covers the care that you want if you are in a coma, near death, or unable to make decisions for yourself  You can list the treatments you want for each condition  Treatment may include pain medicine, surgery, blood transfusions, dialysis, IV or tube feedings, and a ventilator (breathing machine)  · Values history: This document has questions about your views, beliefs, and how you feel and think about life  This information can help others choose the care that you would choose  Why are advance directives important? An advance directive helps you control your care  Although spoken wishes may be used, it is better to have your wishes written down  Spoken wishes can be misunderstood, or not followed  Treatments may be given even if you do not want them  An advance directive may make it easier for your family to make difficult choices about your care  Fall Prevention    Fall prevention  includes ways to make your home and other areas safer  It also includes ways you can move more carefully to prevent a fall   Health conditions that cause changes in your blood pressure, vision, or muscle strength and coordination may increase your risk for falls  Medicines may also increase your risk for falls if they make you dizzy, weak, or sleepy  Fall prevention tips:   · Stand or sit up slowly  · Use assistive devices as directed  · Wear shoes that fit well and have soles that   · Wear a personal alarm  · Stay active  · Manage your medical conditions  Home Safety Tips:  · Add items to prevent falls in the bathroom  · Keep paths clear  · Install bright lights in your home  · Keep items you use often on shelves within reach  · Paint or place reflective tape on the edges of your stairs  Urinary Incontinence   Urinary incontinence (UI)  is when you lose control of your bladder  UI develops because your bladder cannot store or empty urine properly  The 3 most common types of UI are stress incontinence, urge incontinence, or both  Medicines:   · May be given to help strengthen your bladder control  Report any side effects of medication to your healthcare provider  Do pelvic muscle exercises often:  Your pelvic muscles help you stop urinating  Squeeze these muscles tight for 5 seconds, then relax for 5 seconds  Gradually work up to squeezing for 10 seconds  Do 3 sets of 15 repetitions a day, or as directed  This will help strengthen your pelvic muscles and improve bladder control  Train your bladder:  Go to the bathroom at set times, such as every 2 hours, even if you do not feel the urge to go  You can also try to hold your urine when you feel the urge to go  For example, hold your urine for 5 minutes when you feel the urge to go  As that becomes easier, hold your urine for 10 minutes  Self-care:   · Keep a UI record  Write down how often you leak urine and how much you leak  Make a note of what you were doing when you leaked urine  · Drink liquids as directed  You may need to limit the amount of liquid you drink to help control your urine leakage  Do not drink any liquid right before you go to bed  Limit or do not have drinks that contain caffeine or alcohol  · Prevent constipation  Eat a variety of high-fiber foods  Good examples are high-fiber cereals, beans, vegetables, and whole-grain breads  Walking is the best way to trigger your intestines to have a bowel movement  · Exercise regularly and maintain a healthy weight  Weight loss and exercise will decrease pressure on your bladder and help you control your leakage  · Use a catheter as directed  to help empty your bladder  A catheter is a tiny, plastic tube that is put into your bladder to drain your urine  · Go to behavior therapy as directed  Behavior therapy may be used to help you learn to control your urge to urinate  Weight Management   Why it is important to manage your weight:  Being overweight increases your risk of health conditions such as heart disease, high blood pressure, type 2 diabetes, and certain types of cancer  It can also increase your risk for osteoarthritis, sleep apnea, and other respiratory problems  Aim for a slow, steady weight loss  Even a small amount of weight loss can lower your risk of health problems  How to lose weight safely:  A safe and healthy way to lose weight is to eat fewer calories and get regular exercise  You can lose up about 1 pound a week by decreasing the number of calories you eat by 500 calories each day  Healthy meal plan for weight management:  A healthy meal plan includes a variety of foods, contains fewer calories, and helps you stay healthy  A healthy meal plan includes the following:  · Eat whole-grain foods more often  A healthy meal plan should contain fiber  Fiber is the part of grains, fruits, and vegetables that is not broken down by your body  Whole-grain foods are healthy and provide extra fiber in your diet  Some examples of whole-grain foods are whole-wheat breads and pastas, oatmeal, brown rice, and bulgur  · Eat a variety of vegetables every day    Include dark, leafy greens such as spinach, kale, charity greens, and mustard greens  Eat yellow and orange vegetables such as carrots, sweet potatoes, and winter squash  · Eat a variety of fruits every day  Choose fresh or canned fruit (canned in its own juice or light syrup) instead of juice  Fruit juice has very little or no fiber  · Eat low-fat dairy foods  Drink fat-free (skim) milk or 1% milk  Eat fat-free yogurt and low-fat cottage cheese  Try low-fat cheeses such as mozzarella and other reduced-fat cheeses  · Choose meat and other protein foods that are low in fat  Choose beans or other legumes such as split peas or lentils  Choose fish, skinless poultry (chicken or turkey), or lean cuts of red meat (beef or pork)  Before you cook meat or poultry, cut off any visible fat  · Use less fat and oil  Try baking foods instead of frying them  Add less fat, such as margarine, sour cream, regular salad dressing and mayonnaise to foods  Eat fewer high-fat foods  Some examples of high-fat foods include french fries, doughnuts, ice cream, and cakes  · Eat fewer sweets  Limit foods and drinks that are high in sugar  This includes candy, cookies, regular soda, and sweetened drinks  Exercise:  Exercise at least 30 minutes per day on most days of the week  Some examples of exercise include walking, biking, dancing, and swimming  You can also fit in more physical activity by taking the stairs instead of the elevator or parking farther away from stores  Ask your healthcare provider about the best exercise plan for you  © Copyright [employer] 2018 Information is for End User's use only and may not be sold, redistributed or otherwise used for commercial purposes   All illustrations and images included in CareNotes® are the copyrighted property of [employer]  or [address] [place] [address] [employer]

## 2020-07-30 NOTE — PROGRESS NOTES
Assessment and Plan:     Chief Complaint   Patient presents with    Medicare Wellness Visit     Problem List Items Addressed This Visit        Endocrine    Adult hypothyroidism    Relevant Medications    levothyroxine 50 mcg tablet       Nervous and Auditory    Lumbar radiculopathy    Relevant Orders    Ambulatory referral to Physical Therapy       Other    Spinal stenosis of lumbar region    Relevant Orders    Ambulatory referral to Physical Therapy      Other Visit Diagnoses     Medicare annual wellness visit, subsequent    -  Primary        BMI Counseling: Body mass index is 34 8 kg/m²  The BMI is above normal  Nutrition recommendations include decreasing portion sizes, encouraging healthy choices of fruits and vegetables, decreasing fast food intake, consuming healthier snacks, limiting drinks that contain sugar, moderation in carbohydrate intake, increasing intake of lean protein, reducing intake of saturated and trans fat and reducing intake of cholesterol  Exercise recommendations include vigorous physical activity 75 minutes/week  Preventive health issues were discussed with patient, and age appropriate screening tests were ordered as noted in patient's After Visit Summary  Personalized health advice and appropriate referrals for health education or preventive services given if needed, as noted in patient's After Visit Summary       History of Present Illness:   Pleasant [de-identified]year-old here for Medicare wellness    Patient presents for Medicare Annual Wellness visit    Patient Care Team:  Sylvia Buitrago DO as PCP - General  MD Emerald Buckner DO Helyn Asp,      Problem List:     Patient Active Problem List   Diagnosis    Adult hypothyroidism    Glaucoma    Heel spur, left    Hyperlipemia    Vitamin D deficiency    Primary osteoarthritis of both knees    Peripheral neuropathy    Meningioma ([address] )    Lumbar radiculopathy    Spinal stenosis of lumbar region    Right upper quadrant abdominal pain    Fatty liver    Cavernous hemangioma of liver    Asthma    Autoimmune disease (HCC)    Stage 2 chronic kidney disease    Disorder of lipid metabolism    Disorder of uterus    Polyp of corpus uteri    Spondylolisthesis, acquired    Localized, primary osteoarthritis    Lumbosacral radiculitis    Pseudoclaudication syndrome    Rupture of right long head biceps tendon    Genetic testing    Family history of pancreatic cancer    Prediabetes    Axonal neuropathy    Postmenopausal bleeding      Past Medical and Surgical History:     Past Medical History:   Diagnosis Date    Arthritis     Colon polyps     Disease of thyroid gland     Hyperlipidemia     Migraine     Pure hypercholesterolemia     Thyroid disease      Past Surgical History:   Procedure Laterality Date    DILATION AND CURETTAGE, DIAGNOSTIC / THERAPEUTIC      TONSILLECTOMY      TUBAL LIGATION        Family History:     Family History   Problem Relation Age of Onset    Pancreatic cancer Mother 79        susceptibility     Kidney disease Father     Pancreatic cancer Maternal Aunt         over 48    Pancreatic cancer Paternal Aunt         over 48    No Known Problems Maternal Grandmother     No Known Problems Paternal Grandmother     No Known Problems Maternal Aunt     No Known Problems Maternal Aunt     No Known Problems Maternal Aunt     No Known Problems Paternal Aunt       Social History:     E-Cigarette/Vaping    E-Cigarette Use Never User      E-Cigarette/Vaping Substances    Nicotine No     THC No     CBD No     Flavoring No      Social History     Socioeconomic History    Marital status:      Spouse name: None    Number of children: None    Years of education: None    Highest education level: None   Occupational History    None   Social Needs    Financial resource strain: None    Food insecurity     Worry: None     Inability: None    Transportation needs     Medical: None     Non-medical: None   Tobacco Use    Smoking status: Former Smoker     Types: Cigarettes     Last attempt to quit:      Years since quittin 6    Smokeless tobacco: Never Used   Substance and Sexual Activity    Alcohol use:  Yes     Alcohol/week: 1 0 standard drinks     Types: 1 Glasses of wine per week     Comment: social     Drug use: No    Sexual activity: None   Lifestyle    Physical activity     Days per week: None     Minutes per session: None    Stress: None   Relationships    Social connections     Talks on phone: None     Gets together: None     Attends Cheondoism service: None     Active member of club or organization: None     Attends meetings of clubs or organizations: None     Relationship status: None    Intimate partner violence     Fear of current or ex partner: None     Emotionally abused: None     Physically abused: None     Forced sexual activity: None   Other Topics Concern    None   Social History Narrative    Patient education -Asthma resolved     Caffeine use       Medications and Allergies:     Current Outpatient Medications   Medication Sig Dispense Refill    acetaminophen-codeine (TYLENOL #3) 300-30 mg per tablet Take 1 tablet by mouth every 4 to 6 hours as needed for pain  0    albuterol (PROAIR HFA) 90 mcg/act inhaler Inhale 2 puffs every 6 (six) hours as needed for wheezing or shortness of breath 1 Inhaler 1    Ascorbic Acid (VITAMIN C) 100 MG tablet Take 100 mg by mouth daily        bimatoprost (LUMIGAN) 0 01 % ophthalmic drops Administer 1 drop to both eyes daily at bedtime        cannabidiol (EPIDIOLEX) 100 mg/ml SOLN Take by mouth      Cholecalciferol (VITAMIN D) 2000 units CAPS Take 1 tablet by mouth daily      cyanocobalamin (VITAMIN B-12) 1000 MCG tablet Take 1,000 mcg by mouth daily Takes 2 daily      levothyroxine 50 mcg tablet Take 1 tablet (50 mcg total) by mouth daily 90 tablet 3    pravastatin (PRAVACHOL) 20 mg tablet TAKE 1 TABLET(20 MG) BY MOUTH DAILY 90 tablet 3    Turmeric 500 MG CAPS Take 1,500 mg by mouth daily      cyclobenzaprine (FLEXERIL) 5 mg tablet Take 1 tablet (5 mg total) by mouth daily as needed for muscle spasms (no driving) (Patient not taking: Reported on 7/30/2020) 14 tablet 0    Multiple Vitamins-Minerals (MULTIVITAMIN ADULT PO) Take by mouth daily       Current Facility-Administered Medications   Medication Dose Route Frequency Provider Last Rate Last Dose    cyanocobalamin injection 1,000 mcg  1,000 mcg Intramuscular Q00 Days Lucille Sanchez, DO   9,523 mcg at 02/13/20 8743    cyanocobalamin injection 1,000 mcg  1,000 mcg Intramuscular Q30 Days Romayne Chars, DO   1,501 mcg at 03/04/20 1116     Allergies   Allergen Reactions    Molds & Smuts Shortness Of Breath    Penicillins Hives, Rash and Edema     Category: Allergy; Other reaction(s): Other (see comments)  Category: Allergy;     Mold Extract [Trichophyton]       Immunizations:     Immunization History   Administered Date(s) Administered    INFLUENZA 11/14/2012, 11/06/2013    Influenza Split High Dose Preservative Free IM 11/01/2012, 09/18/2015, 09/15/2016, 10/30/2016, 09/22/2017    Influenza, high dose seasonal 0 5 mL 10/09/2018    Pneumococcal Conjugate 13-Valent 01/19/2018    Pneumococcal Conjugate PCV 7 06/19/2015    Pneumococcal Polysaccharide PPV23 07/13/2008    Rabies, Intramuscular 05/27/2018, 05/30/2018, 06/03/2018, 06/10/2018    TD (adult) Preservative Free 01/13/2012    Zoster 01/13/2010    Zoster Vaccine Recombinant 03/06/2020, 07/22/2020    influenza, trivalent, adjuvanted 10/11/2019      Health Maintenance: There are no preventive care reminders to display for this patient        Topic Date Due    Influenza Vaccine  07/01/2020      Medicare Health Risk Assessment:     /68   Pulse 68   Temp 98 2 °F (36 8 °C) (Temporal)   Resp 16   Ht 5'   Wt 80 8 kg (178 lb 3 2 oz)   SpO2 97%   BMI 34 80 kg/m²          Health Risk Assessment: Patient rates overall health as good  Patient feels that their physical health rating is same  Eyesight was rated as same  Hearing was rated as same  Patient feels that their emotional and mental health rating is same  Pain experienced in the last 7 days has been some  Patient's pain rating has been 4/10  Patient states that she has experienced weight loss or gain in last 6 months  Depression Screening:   PHQ-2 Score: 0      Fall Risk Screening: In the past year, patient has experienced: history of falling in past year    Injured during fall?: No    Feels unsteady when standing or walking?: Yes    Worried about falling?: Yes      Urinary Incontinence Screening:   Patient has leaked urine accidently in the last six months  Home Safety:  Patient does not have trouble with stairs inside or outside of their home  Patient has working smoke alarms and has working carbon monoxide detector  Home safety hazards include: none  Nutrition:   Current diet is Regular  Medications:   Patient is not currently taking any over-the-counter supplements  Patient is able to manage medications  Activities of Daily Living (ADLs)/Instrumental Activities of Daily Living (IADLs):   Walk and transfer into and out of bed and chair?: Yes  Dress and groom yourself?: Yes    Bathe or shower yourself?: Yes    Feed yourself? Yes  Do your laundry/housekeeping?: Yes  Manage your money, pay your bills and track your expenses?: Yes  Make your own meals?: Yes    Do your own shopping?: Yes    Previous Hospitalizations:   Any hospitalizations or ED visits within the last 12 months?: No      Advance Care Planning:   Living will: Yes    Durable POA for healthcare:  Yes    Advanced directive: Yes    Five wishes given: Yes      PREVENTIVE SCREENINGS      Cardiovascular Screening:    General: Screening Not Indicated and History Lipid Disorder      Diabetes Screening:     General: Screening Current      Colorectal Cancer Screening: General: Screening Current      Breast Cancer Screening:     General: Screening Current      Cervical Cancer Screening:    General: Screening Not Indicated      Osteoporosis Screening:    General: Screening Current      Abdominal Aortic Aneurysm (AAA) Screening:        General: Screening Current      Lung Cancer Screening:     General: Screening Not Indicated      Hepatitis C Screening:    General: Screening Not Indicated    Hep C Screening Accepted: No     Physical Exam  Constitutional:       Appearance: Normal appearance  She is obese  Comments: 58-year-old female who appears younger than her stated age   HENT:      Right Ear: Tympanic membrane normal       Left Ear: Tympanic membrane normal       Nose: Nose normal    Eyes:      Pupils: Pupils are equal, round, and reactive to light  Neck:      Musculoskeletal: Normal range of motion  Cardiovascular:      Rate and Rhythm: Normal rate and regular rhythm  Pulses: Normal pulses  Heart sounds: Normal heart sounds  Pulmonary:      Effort: Pulmonary effort is normal       Breath sounds: Normal breath sounds  Musculoskeletal: Normal range of motion  Skin:     General: Skin is warm  Neurological:      General: No focal deficit present  Mental Status: She is alert and oriented to person, place, and time  Psychiatric:         Mood and Affect: Mood normal          Behavior: Behavior normal          Thought Content:  Thought content normal          Judgment: Judgment normal        Keeley Juarez DO

## 2020-09-01 DIAGNOSIS — E78.49 OTHER HYPERLIPIDEMIA: ICD-10-CM

## 2020-09-01 RX ORDER — PRAVASTATIN SODIUM 20 MG
20 TABLET ORAL DAILY
Qty: 90 TABLET | Refills: 1 | Status: SHIPPED | OUTPATIENT
Start: 2020-09-01 | End: 2020-11-25

## 2020-09-25 NOTE — PROGRESS NOTES
PT Evaluation     Today's date: 2018  Patient name: Trip Castillo  : 1940  MRN: [id number]  Referring provider: Tracey Smith DO  Dx:   Encounter Diagnosis     ICD-10-CM    1  Rupture of right distal biceps tendon, subsequent encounter S46 211D    2  Acute pain of both knees M25 561     M25 562    3  Pain of both hip joints M25 551     M25 552                   Assessment  Assessment details: Pt is a pleasant 77 y o  female presenting to outpatient physical therapy with Rupture of right distal biceps tendon, subsequent encounter  (primary encounter diagnosis)  Acute pain of both knees  Sciatica of left side   Pt presents with pain, decreased range of motion, decreased strength, and decreased tolerance to activity  Displays movement impairment diagnosis of left adverse neural tension and lumbar spine hypomobility dysfunction  Pt is a good candidate for outpatient physical therapy and would benefit from skilled physical therapy to address limitations and to achieve goals  Thank you for this referral    Impairments: abnormal coordination, abnormal or restricted ROM, activity intolerance, impaired physical strength and pain with function  Understanding of Dx/Px/POC: good   Prognosis: good    Goals  ST  Patient will report 25% decrease in pain in 4 weeks  2  Patient will demonstrate 25% improvement in ROM in 4 weeks  3  Patient will demonstrate 1/2 grade improvement in strength in 4 weeks  LT  Patient will be able to perform IADLS without restriction or pain by discharge  2  Patient will be independent in HEP by discharge  3  Patient will be able to return to recreational/work duties without restriction or pain by discharge        Plan  Patient would benefit from: PT eval and skilled PT  Planned modality interventions: cryotherapy and thermotherapy: hydrocollator packs  Planned therapy interventions: IADL retraining, body mechanics training, flexibility, functional ROM exercises, home exercise program, neuromuscular re-education, manual therapy, postural training, strengthening, stretching, therapeutic activities, therapeutic exercise and joint mobilization  Frequency: 2x week  Duration in visits: 20  Duration in weeks: 16  Treatment plan discussed with: patient        Subjective Evaluation    History of Present Illness  Mechanism of injury: Patient comes to therapy with new c/o bilateral LE pain  States symptoms began about 1 5 weeks ago, of unknown etiology  States mild pain in right knee, however, primary complaint is left LE  Notes symptoms travel between low back and foot, traveling down posterior L LE  States most pain is located in posterior L thigh  Reports she wakes multiple times a night due to pain  States she sleeps on her side, stating left side is more comfortable  Patient reports she experiences mild reduced sensation in bilateral feet and lower legs, noting this has been occurring since the Summer  Pt denies bowel or bladder dysfunction, saddle anesthesia, fever, chills, nausea, or vomiting  Pt also denies pain at night or recent unexplained weight loss  Pt reports she has appointment with pain management physician on 1/4/18  States reports she feels this pain is due to her spinal stenosis, as this pain is the kind she typically experiences before needing an injection in lumbar spine  Pain  Current pain rating: 3  At worst pain rating: 10  Quality: dull ache          Objective     Palpation   Left   Tenderness of the erector spinae and lumbar paraspinals       Additional Palpation Details  12/4: TTP throughout left gluteal region, hamstrings, ITB    Active Range of Motion     Lumbar   Flexion: WFL  Extension: WFL  Left lateral flexion: WFL and with pain  Right lateral flexion: [facility]    Additional Active Range of Motion Details  12/4: ROM grossly WFL, however, painful into left LF, which reproduces left LE pain    Passive Range of Motion     Additional Passive Range of Motion Details  12/4: Pain with passive hip flex, IR, ER, abduction    Tests     Lumbar     Left   Positive passive SLR and tibial bias  Right   Positive passive SLR  Negative crossed SLR  43yo female with PSHx PAULIE/BSO 8/19/2020 for fibroids admitted for persistent small bowel obstruction s/p laparoscopic NARCISO, laparotomy, extensive NARCISO, small bowel resection with primary anastomosis 9/15 complicated by small bowel leak s/p re-exploration, wash out, small bowel resection and primary anastomosis 9/15.  Complained of lower abdominal pain with cellulitis and induration.     At this time, patient is hemodynamically stable with overall improving tachycarida 100's. No longer on pressors. Breathing comfortably. Complains of abdominal pain "inside" but overall better than yesterday. Tolerated CLD but complains of some "indigestion." +bowel function - gas and BM. WBC 17 from 14. Continue antibiotics. Klebsiella from wound. ID following. Pain control, trend labs, continue antibiotics. Encourage ambulation/OOB, incentive spirometer, DVT ppx. Primary care as per ICU.

## 2020-11-25 DIAGNOSIS — E78.49 OTHER HYPERLIPIDEMIA: ICD-10-CM

## 2020-11-25 RX ORDER — PRAVASTATIN SODIUM 20 MG
20 TABLET ORAL DAILY
Qty: 90 TABLET | Refills: 1 | Status: SHIPPED | OUTPATIENT
Start: 2020-11-25 | End: 2021-07-05

## 2021-01-11 ENCOUNTER — TELEPHONE (OUTPATIENT)
Dept: FAMILY MEDICINE CLINIC | Facility: CLINIC | Age: 81
End: 2021-01-11

## 2021-01-11 ENCOUNTER — VBI (OUTPATIENT)
Dept: ADMINISTRATIVE | Facility: OTHER | Age: 81
End: 2021-01-11

## 2021-01-11 NOTE — TELEPHONE ENCOUNTER
Pt is asking can you order her routine  fasting blwk the entire panel per pt's request  It has been some time since she has had all labs checked  Pt uses the [facility] lab across the parmar form our office  Pt has an appointment 02/04/2021 with you       Pt's number is [phone] or [phone]

## 2021-01-12 DIAGNOSIS — E03.9 ADULT HYPOTHYROIDISM: Primary | ICD-10-CM

## 2021-01-12 DIAGNOSIS — N18.2 STAGE 2 CHRONIC KIDNEY DISEASE: ICD-10-CM

## 2021-01-12 DIAGNOSIS — E78.01 FAMILIAL HYPERCHOLESTEROLEMIA: ICD-10-CM

## 2021-01-12 DIAGNOSIS — E55.9 VITAMIN D DEFICIENCY: ICD-10-CM

## 2021-01-12 DIAGNOSIS — K76.0 FATTY LIVER: ICD-10-CM

## 2021-01-12 DIAGNOSIS — E53.8 B12 DEFICIENCY: ICD-10-CM

## 2021-01-12 NOTE — TELEPHONE ENCOUNTER
Upon review of the In Basket request we were able to upload a Non-Diabetic Eye Exam to patients chart under the media section    Any additional questions or concerns should be emailed to the Practice Liaisons via [email or web address] email, please do not reply via In Basket      Thank you  Theodora Trujillo

## 2021-01-12 NOTE — TELEPHONE ENCOUNTER
Patient had her full blood work complement done just December 2019 so she is not very overdue  Will put lab orders in the chart    They are fasting

## 2021-01-12 NOTE — TELEPHONE ENCOUNTER
Upon review of the In Basket request we were able to upload a Non-Diabetic Eye Exam to patients chart under the media section    Any additional questions or concerns should be emailed to the Practice Liaisons via [email or web address] email, please do not reply via In Basket      Thank you  Jus Malin

## 2021-01-12 NOTE — TELEPHONE ENCOUNTER
Please be aware that if we call from a blocked/ unknown number pt willnot answer  She  just request we please leave a detailed message  I advised pt that if she would not answer when our office calls  we should be checking to confirm whether we can leave a message or not

## 2021-01-27 ENCOUNTER — APPOINTMENT (OUTPATIENT)
Dept: LAB | Facility: CLINIC | Age: 81
End: 2021-01-27
Payer: MEDICARE

## 2021-01-27 DIAGNOSIS — K76.0 FATTY LIVER: ICD-10-CM

## 2021-01-27 DIAGNOSIS — E03.9 ADULT HYPOTHYROIDISM: ICD-10-CM

## 2021-01-27 DIAGNOSIS — N18.2 STAGE 2 CHRONIC KIDNEY DISEASE: ICD-10-CM

## 2021-01-27 DIAGNOSIS — E78.01 FAMILIAL HYPERCHOLESTEROLEMIA: ICD-10-CM

## 2021-01-27 DIAGNOSIS — E55.9 VITAMIN D DEFICIENCY: ICD-10-CM

## 2021-01-27 DIAGNOSIS — E53.8 B12 DEFICIENCY: ICD-10-CM

## 2021-01-27 LAB
25(OH)D3 SERPL-MCNC: 36.2 NG/ML (ref 30–100)
ALBUMIN SERPL BCP-MCNC: 4 G/DL (ref 3.5–5)
ALP SERPL-CCNC: 86 U/L (ref 46–116)
ALT SERPL W P-5'-P-CCNC: 28 U/L (ref 12–78)
ANION GAP SERPL CALCULATED.3IONS-SCNC: 5 MMOL/L (ref 4–13)
AST SERPL W P-5'-P-CCNC: 19 U/L (ref 5–45)
BILIRUB SERPL-MCNC: 0.5 MG/DL (ref 0.2–1)
BUN SERPL-MCNC: 12 MG/DL (ref 5–25)
CALCIUM SERPL-MCNC: 9.9 MG/DL (ref 8.3–10.1)
CHLORIDE SERPL-SCNC: 106 MMOL/L (ref 100–108)
CHOLEST SERPL-MCNC: 221 MG/DL (ref 50–200)
CO2 SERPL-SCNC: 29 MMOL/L (ref 21–32)
CREAT SERPL-MCNC: 0.92 MG/DL (ref 0.6–1.3)
GFR SERPL CREATININE-BSD FRML MDRD: 59 ML/MIN/1.73SQ M
GLUCOSE P FAST SERPL-MCNC: 82 MG/DL (ref 65–99)
HDLC SERPL-MCNC: 65 MG/DL
LDLC SERPL CALC-MCNC: 132 MG/DL (ref 0–100)
POTASSIUM SERPL-SCNC: 4.2 MMOL/L (ref 3.5–5.3)
PROT SERPL-MCNC: 7.5 G/DL (ref 6.4–8.2)
SODIUM SERPL-SCNC: 140 MMOL/L (ref 136–145)
T3FREE SERPL-MCNC: 2.63 PG/ML (ref 2.3–4.2)
T4 FREE SERPL-MCNC: 1.16 NG/DL (ref 0.76–1.46)
TRIGL SERPL-MCNC: 121 MG/DL
TSH SERPL DL<=0.05 MIU/L-ACNC: 1.85 UIU/ML (ref 0.36–3.74)
VIT B12 SERPL-MCNC: 1343 PG/ML (ref 100–900)

## 2021-01-27 PROCEDURE — 82306 VITAMIN D 25 HYDROXY: CPT

## 2021-01-27 PROCEDURE — 84481 FREE ASSAY (FT-3): CPT

## 2021-01-27 PROCEDURE — 84443 ASSAY THYROID STIM HORMONE: CPT

## 2021-01-27 PROCEDURE — 82607 VITAMIN B-12: CPT

## 2021-01-27 PROCEDURE — 36415 COLL VENOUS BLD VENIPUNCTURE: CPT

## 2021-01-27 PROCEDURE — 84439 ASSAY OF FREE THYROXINE: CPT

## 2021-01-27 PROCEDURE — 80053 COMPREHEN METABOLIC PANEL: CPT

## 2021-01-27 PROCEDURE — 80061 LIPID PANEL: CPT

## 2021-02-04 ENCOUNTER — OFFICE VISIT (OUTPATIENT)
Dept: FAMILY MEDICINE CLINIC | Facility: CLINIC | Age: 81
End: 2021-02-04
Payer: MEDICARE

## 2021-02-04 VITALS
RESPIRATION RATE: 16 BRPM | HEIGHT: 60 IN | SYSTOLIC BLOOD PRESSURE: 128 MMHG | TEMPERATURE: 96.9 F | DIASTOLIC BLOOD PRESSURE: 78 MMHG | WEIGHT: 177.8 LBS | BODY MASS INDEX: 34.91 KG/M2 | OXYGEN SATURATION: 97 % | HEART RATE: 72 BPM

## 2021-02-04 DIAGNOSIS — E03.9 ADULT HYPOTHYROIDISM: Primary | ICD-10-CM

## 2021-02-04 DIAGNOSIS — J45.20 MILD INTERMITTENT ASTHMA WITHOUT COMPLICATION: ICD-10-CM

## 2021-02-04 PROCEDURE — 99214 OFFICE O/P EST MOD 30 MIN: CPT | Performed by: FAMILY MEDICINE

## 2021-02-04 RX ORDER — LANOLIN ALCOHOL/MO/W.PET/CERES
2 CREAM (GRAM) TOPICAL DAILY
COMMUNITY
End: 2022-04-15

## 2021-02-04 RX ORDER — ALBUTEROL SULFATE 90 UG/1
2 AEROSOL, METERED RESPIRATORY (INHALATION) EVERY 6 HOURS PRN
Qty: 1 INHALER | Refills: 1 | Status: SHIPPED | OUTPATIENT
Start: 2021-02-04 | End: 2021-03-03

## 2021-02-04 NOTE — PROGRESS NOTES
Assessment/Plan:     Diagnoses and all orders for this visit:    Adult hypothyroidism    Mild intermittent asthma without complication  -     albuterol (ProAir HFA) 90 mcg/act inhaler; Inhale 2 puffs every 6 (six) hours as needed for wheezing or shortness of breath    Other orders  -     vitamin B-12 (VITAMIN B-12) 1,000 mcg tablet; Take 2 tablets by mouth daily  -     Multiple Vitamins-Minerals (EMERGEN-C IMMUNE PO); Vitamin C   daily          Subjective:   Chief Complaint   Patient presents with    Follow-up     6 month follow up      Patient ID: Araceli Fischer is a 79 y o  female  24-year-old female here follow-up  Tries to keep herself moving      Will be receiving COVID vaccination  Is sign of [facility]   Lab work done January 27  Patient does see Renal   Kidney function excellent  Lipid profile acceptable for 79 year young  The following portions of the patient's history were reviewed and updated as appropriate: allergies, current medications, past family history, past medical history, past social history, past surgical history and problem list       Review of Systems   Constitutional: Negative for fatigue  Respiratory: Negative for cough  Cardiovascular: Negative for chest pain  Musculoskeletal: Positive for arthralgias and myalgias  Aches and pains stable   Neurological: Negative  Psychiatric/Behavioral: Negative            Objective:      /78   Pulse 72   Temp (!) 96 9 °F (36 1 °C) (Temporal)   Resp 16   Ht 5' (1 524 m)   Wt 80 6 kg (177 lb 12 8 oz)   SpO2 97%   BMI 34 72 kg/m²     Component      Latest Ref Rng & Units 1/27/2021   Sodium      136 - 145 mmol/L 140   Potassium      3 5 - 5 3 mmol/L 4 2   Chloride      100 - 108 mmol/L 106   CO2      21 - 32 mmol/L 29   Anion Gap      4 - 13 mmol/L 5   BUN      5 - 25 mg/dL 12   Creatinine      0 60 - 1 30 mg/dL 0 92   GLUCOSE FASTING      65 - 99 mg/dL 82   Calcium      8 3 - 10 1 mg/dL 9 9   AST 5 - 45 U/L 19   ALT      12 - 78 U/L 28   Alkaline Phosphatase      46 - 116 U/L 86   Total Protein      6 4 - 8 2 g/dL 7 5   Albumin      3 5 - 5 0 g/dL 4 0   TOTAL BILIRUBIN      0 20 - 1 00 mg/dL 0 50   eGFR      ml/min/1 73sq m 59   Cholesterol      50 - 200 mg/dL 221 (H)   Triglycerides      <=150 mg/dL 121   HDL      >=40 mg/dL 65   LDL Calculated      0 - 100 mg/dL 132 (H)   Vit D, 25-Hydroxy      30 0 - 100 0 ng/mL 36 2   Vitamin B-12      100 - 900 pg/mL 1,343 (H)   T3, Free      2 30 - 4 20 pg/mL 2 63   Free T4      0 76 - 1 46 ng/dL 1 16   TSH 3RD GENERATON      0 358 - 3 740 uIU/mL 1 850    discussed adjusting down on B12  Continue thyroid  Patient follows with [facility] for gyn  Relatively stable from pain management standpoint  Is enjoying her home  Physical Exam  Constitutional:       General: She is not in acute distress  Appearance: Normal appearance  She is well-developed  Comments: 53-year-old female appears younger than her stated age   HENT:      Head: Normocephalic  Eyes:      Conjunctiva/sclera: Conjunctivae normal       Pupils: Pupils are equal, round, and reactive to light  Neck:      Musculoskeletal: Normal range of motion and neck supple  Cardiovascular:      Rate and Rhythm: Normal rate and regular rhythm  Heart sounds: No murmur  Pulmonary:      Effort: Pulmonary effort is normal       Breath sounds: Normal breath sounds  Abdominal:      General: Bowel sounds are normal       Palpations: Abdomen is soft  There is no mass  Tenderness: There is no abdominal tenderness  Musculoskeletal: Normal range of motion  Skin:     General: Skin is warm and dry  Neurological:      Mental Status: She is alert and oriented to person, place, and time  Deep Tendon Reflexes: Reflexes are normal and symmetric  Psychiatric:         Behavior: Behavior normal          Thought Content:  Thought content normal

## 2021-03-03 DIAGNOSIS — J45.20 MILD INTERMITTENT ASTHMA WITHOUT COMPLICATION: ICD-10-CM

## 2021-03-03 RX ORDER — ALBUTEROL SULFATE 90 UG/1
AEROSOL, METERED RESPIRATORY (INHALATION)
Qty: 8.5 G | Refills: 1 | Status: SHIPPED | OUTPATIENT
Start: 2021-03-03 | End: 2022-01-05

## 2021-03-08 ENCOUNTER — TELEPHONE (OUTPATIENT)
Dept: FAMILY MEDICINE CLINIC | Facility: CLINIC | Age: 81
End: 2021-03-08

## 2021-03-08 DIAGNOSIS — M54.16 LUMBAR RADICULOPATHY: Primary | ICD-10-CM

## 2021-03-08 DIAGNOSIS — M48.062 SPINAL STENOSIS OF LUMBAR REGION WITH NEUROGENIC CLAUDICATION: ICD-10-CM

## 2021-03-08 NOTE — TELEPHONE ENCOUNTER
Ambulatory referral to Neurosurgery   Diagnosis:  Lumbar radiculopathy (M54 16 [ICD-10-CM])  Spinal stenosis of lumbar region with neurogenic claudication (M48 062 [ICD-10-CM])   MRI in chart from 2018 in  [facility] everywhere"        Referral Information:   # Visits:   Referral Type: Consult - AMB [[id number]]   Urgency:  Routine Referral Reason: Specialty Services Required   Order Date: Mar 8, 2021 End Date:   To be determined by Insurer   Start Date:         Referred To:    Yessi Ramirez MD  Phone: [phone]  Fax: [phone]   [address] [place] [address]

## 2021-03-08 NOTE — TELEPHONE ENCOUNTER
Patient called for a recommendation of a spinal surgeon for consultation due to her L leg collapsing  Please advise patient at [phone] if she doesn't answer we can leave a message for her

## 2021-03-08 NOTE — TELEPHONE ENCOUNTER
Pt states she really wanted a recommendation for a spinal surgeon  Please advise  Also Pt then wants a call back on her home phone and it is ok to leave a message with details

## 2021-03-08 NOTE — TELEPHONE ENCOUNTER
I know that patient has had MRI 2018 and that she did see [facility] (Dr Jose E Yanez)  Probably should start with updating at their office and then moving forward of consideration surgically if needed  They do have an orthopedic spinal surgeon there    I would also recommend having a 2nd opinion with regards to neuro surgical spinal surgeon

## 2021-03-16 ENCOUNTER — TELEPHONE (OUTPATIENT)
Dept: FAMILY MEDICINE CLINIC | Facility: CLINIC | Age: 81
End: 2021-03-16

## 2021-03-16 NOTE — TELEPHONE ENCOUNTER
Patient needs a new handicap placard, the one she currently has is going to   Please contact patient when the forms is ready for pickup at [phone]

## 2021-04-08 DIAGNOSIS — E03.9 ADULT HYPOTHYROIDISM: ICD-10-CM

## 2021-04-08 RX ORDER — LEVOTHYROXINE SODIUM 0.05 MG/1
50 TABLET ORAL DAILY
Qty: 90 TABLET | Refills: 3 | Status: SHIPPED | OUTPATIENT
Start: 2021-04-08 | End: 2021-12-20

## 2021-06-02 ENCOUNTER — TELEPHONE (OUTPATIENT)
Dept: FAMILY MEDICINE CLINIC | Facility: CLINIC | Age: 81
End: 2021-06-02

## 2021-06-02 DIAGNOSIS — M81.0 AGE-RELATED OSTEOPOROSIS WITHOUT CURRENT PATHOLOGICAL FRACTURE: ICD-10-CM

## 2021-06-02 DIAGNOSIS — Z12.31 SCREENING MAMMOGRAM, ENCOUNTER FOR: Primary | ICD-10-CM

## 2021-06-02 DIAGNOSIS — Z12.31 SCREENING MAMMOGRAM, ENCOUNTER FOR: ICD-10-CM

## 2021-06-02 DIAGNOSIS — Z13.820 SCREENING FOR OSTEOPOROSIS: ICD-10-CM

## 2021-06-02 DIAGNOSIS — Z13.820 SCREENING FOR OSTEOPOROSIS: Primary | ICD-10-CM

## 2021-06-02 NOTE — TELEPHONE ENCOUNTER
Patient is calling today  She needs an order for her routine mammogram and routine DXA scan  Please order for patient

## 2021-07-05 DIAGNOSIS — E78.49 OTHER HYPERLIPIDEMIA: ICD-10-CM

## 2021-07-05 RX ORDER — PRAVASTATIN SODIUM 20 MG
20 TABLET ORAL DAILY
Qty: 90 TABLET | Refills: 1 | Status: SHIPPED | OUTPATIENT
Start: 2021-07-05 | End: 2022-04-18

## 2021-07-13 ENCOUNTER — HOSPITAL ENCOUNTER (OUTPATIENT)
Dept: MAMMOGRAPHY | Facility: MEDICAL CENTER | Age: 81
Discharge: HOME | End: 2021-07-13
Payer: MEDICARE

## 2021-07-13 ENCOUNTER — HOSPITAL ENCOUNTER (OUTPATIENT)
Dept: BONE DENSITY | Facility: MEDICAL CENTER | Age: 81
Discharge: HOME | End: 2021-07-13
Payer: MEDICARE

## 2021-07-13 VITALS — WEIGHT: 174 LBS | BODY MASS INDEX: 34.16 KG/M2 | HEIGHT: 60 IN

## 2021-07-13 DIAGNOSIS — Z13.820 SCREENING FOR OSTEOPOROSIS: ICD-10-CM

## 2021-07-13 DIAGNOSIS — Z12.31 SCREENING MAMMOGRAM, ENCOUNTER FOR: ICD-10-CM

## 2021-07-13 DIAGNOSIS — M81.0 AGE-RELATED OSTEOPOROSIS WITHOUT CURRENT PATHOLOGICAL FRACTURE: ICD-10-CM

## 2021-07-13 PROCEDURE — 77063 BREAST TOMOSYNTHESIS BI: CPT

## 2021-07-13 PROCEDURE — 77067 SCR MAMMO BI INCL CAD: CPT

## 2021-07-13 PROCEDURE — 77080 DXA BONE DENSITY AXIAL: CPT

## 2021-07-30 ENCOUNTER — TELEPHONE (OUTPATIENT)
Dept: FAMILY MEDICINE CLINIC | Facility: CLINIC | Age: 81
End: 2021-07-30

## 2021-07-30 DIAGNOSIS — E78.49 OTHER HYPERLIPIDEMIA: Primary | ICD-10-CM

## 2021-07-30 NOTE — TELEPHONE ENCOUNTER
Tell the patient I did add lipid profile in  If she goes to [facility] the orders are in the computer    If not she can come by and pick it up off the printer

## 2021-07-30 NOTE — TELEPHONE ENCOUNTER
Khalif Quinonez called the office in regards to she expressed she had some leg cramping pt stopped her cholesterol med for a month as discussed with Dr Vargas  It seemed to have helped  when she stopped it, Pt did start up the cholesterol medication again then stopped it ,because  the leg cramps returned  Pt is seeing Dr Vargas on 08/05/21  That same day piror to her appointment pt will get labs drawn  for her kidney doctor  since pt is going  in for labs would Dr Vargas  want her to have her cholesterol checked? Pt is aware Dr Vargas is out of the office  Dr Ibarra pt is asking can you please advise since she is  ut  Pt's number is [phone] please leave a detailed message

## 2021-08-05 ENCOUNTER — APPOINTMENT (OUTPATIENT)
Dept: LAB | Facility: CLINIC | Age: 81
End: 2021-08-05
Payer: MEDICARE

## 2021-08-05 ENCOUNTER — OFFICE VISIT (OUTPATIENT)
Dept: FAMILY MEDICINE CLINIC | Facility: CLINIC | Age: 81
End: 2021-08-05
Payer: MEDICARE

## 2021-08-05 VITALS
RESPIRATION RATE: 16 BRPM | DIASTOLIC BLOOD PRESSURE: 78 MMHG | TEMPERATURE: 97.6 F | HEIGHT: 59 IN | OXYGEN SATURATION: 95 % | BODY MASS INDEX: 35.76 KG/M2 | SYSTOLIC BLOOD PRESSURE: 130 MMHG | HEART RATE: 73 BPM | WEIGHT: 177.4 LBS

## 2021-08-05 DIAGNOSIS — M19.91 LOCALIZED, PRIMARY OSTEOARTHRITIS: ICD-10-CM

## 2021-08-05 DIAGNOSIS — N18.30 STAGE 3 CHRONIC KIDNEY DISEASE, UNSPECIFIED WHETHER STAGE 3A OR 3B CKD (HCC): ICD-10-CM

## 2021-08-05 DIAGNOSIS — E78.01 FAMILIAL HYPERCHOLESTEROLEMIA: Primary | ICD-10-CM

## 2021-08-05 DIAGNOSIS — E78.49 OTHER HYPERLIPIDEMIA: ICD-10-CM

## 2021-08-05 DIAGNOSIS — E55.9 VITAMIN D DEFICIENCY, UNSPECIFIED: ICD-10-CM

## 2021-08-05 DIAGNOSIS — N18.32 STAGE 3B CHRONIC KIDNEY DISEASE (HCC): ICD-10-CM

## 2021-08-05 DIAGNOSIS — E03.9 ADULT HYPOTHYROIDISM: ICD-10-CM

## 2021-08-05 PROBLEM — E66.01 OBESITY, MORBID (HCC): Status: ACTIVE | Noted: 2021-08-05

## 2021-08-05 LAB
25(OH)D3 SERPL-MCNC: 35.5 NG/ML (ref 30–100)
ALBUMIN SERPL BCP-MCNC: 3.3 G/DL (ref 3.5–5)
ANION GAP SERPL CALCULATED.3IONS-SCNC: 6 MMOL/L (ref 4–13)
BUN SERPL-MCNC: 13 MG/DL (ref 5–25)
CALCIUM ALBUM COR SERPL-MCNC: 9.7 MG/DL (ref 8.3–10.1)
CALCIUM SERPL-MCNC: 9.1 MG/DL (ref 8.3–10.1)
CHLORIDE SERPL-SCNC: 106 MMOL/L (ref 100–108)
CHOLEST SERPL-MCNC: 221 MG/DL (ref 50–200)
CK MB SERPL-MCNC: 2.4 % (ref 0–2.5)
CK MB SERPL-MCNC: 4.8 NG/ML (ref 0–5)
CK SERPL-CCNC: 202 U/L (ref 26–192)
CO2 SERPL-SCNC: 27 MMOL/L (ref 21–32)
CREAT SERPL-MCNC: 0.79 MG/DL (ref 0.6–1.3)
GFR SERPL CREATININE-BSD FRML MDRD: 70 ML/MIN/1.73SQ M
GLUCOSE P FAST SERPL-MCNC: 90 MG/DL (ref 65–99)
HCT VFR BLD AUTO: 40.3 % (ref 34.8–46.1)
HDLC SERPL-MCNC: 70 MG/DL
HGB BLD-MCNC: 13 G/DL (ref 11.5–15.4)
LDLC SERPL CALC-MCNC: 135 MG/DL (ref 0–100)
NONHDLC SERPL-MCNC: 151 MG/DL
PHOSPHATE SERPL-MCNC: 3.4 MG/DL (ref 2.3–4.1)
POTASSIUM SERPL-SCNC: 4.4 MMOL/L (ref 3.5–5.3)
PTH-INTACT SERPL-MCNC: 45.3 PG/ML (ref 18.4–80.1)
SODIUM SERPL-SCNC: 139 MMOL/L (ref 136–145)
TRIGL SERPL-MCNC: 80 MG/DL

## 2021-08-05 PROCEDURE — 80069 RENAL FUNCTION PANEL: CPT

## 2021-08-05 PROCEDURE — 82553 CREATINE MB FRACTION: CPT

## 2021-08-05 PROCEDURE — 80061 LIPID PANEL: CPT

## 2021-08-05 PROCEDURE — 85014 HEMATOCRIT: CPT

## 2021-08-05 PROCEDURE — 83970 ASSAY OF PARATHORMONE: CPT

## 2021-08-05 PROCEDURE — 82306 VITAMIN D 25 HYDROXY: CPT

## 2021-08-05 PROCEDURE — 82550 ASSAY OF CK (CPK): CPT

## 2021-08-05 PROCEDURE — 99214 OFFICE O/P EST MOD 30 MIN: CPT | Performed by: FAMILY MEDICINE

## 2021-08-05 PROCEDURE — 36415 COLL VENOUS BLD VENIPUNCTURE: CPT

## 2021-08-05 PROCEDURE — 85018 HEMOGLOBIN: CPT

## 2021-08-05 NOTE — PROGRESS NOTES
Assessment/Plan:     Diagnoses and all orders for this visit:    Familial hypercholesterolemia  -     Ambulatory referral to Nutrition Services; Future    BMI 35 0-35 9,adult    Stage 3b chronic kidney disease ([facility] [address] )    Adult hypothyroidism    Localized, primary osteoarthritis    Other orders  -     Carboxymethylcellul-Glycerin 0 5-0 9 % SOLN; Refresh Optive 0 5 %-0 9 % eye drops          Subjective:   Chief Complaint   Patient presents with    Follow-up     6 month follow up, would like to see a nutritionist     Knee Pain     patient had fall and hurt right knee in March 2021      Patient ID: Debra Reddy is a 79 y o  female  58-year-old female here for general review  Reviewed with patient her list of issues/complaints:  Patient was experimenting with the pravastatin with regards to symptoms of aches and pains  She stop the pravastatin on June 1st through June 24th and felt better  She restarted it on July 29th and symptoms are starting to come back  Dizzy July 24  Stayed home all day  Better next day  Labs done today----obviously pending  Feels her hands are shaking a bit more  This did run in the family  Patient is experiencing intermittent tingling and numbness in feet and legs from knees down left hand and arm  Occasional right toe pain  Seems somewhat easy bruisability for all but not concerning  Occasional stomach achey-- does not feel there was any triggers  We discussed DEXA results--- T-scores are in normal range  She will be seeing a nutritionist at [facility]  She was wondering when her next gynecological visit should be done  Last documented in September 2017 at that time have ultrasound  She has been seen by [facility]  She also follows with Nephrology      The following portions of the patient's history were reviewed and updated as appropriate: allergies, current medications, past family history, past medical history, past social history, past surgical history and problem list       Review of Systems   Constitutional: Negative for fatigue  Respiratory: Negative for cough  Gastrointestinal:        Gerd   Musculoskeletal: Positive for arthralgias and back pain  Neurological: Positive for dizziness  Tremors: hands  Psychiatric/Behavioral: Negative for sleep disturbance  Objective:  Lipid profile represents some time off of the cholesterol  She really does not want to continue with the statin and is going to seek nutritional consultation  At 79 years young, I would agree with her  DXA bone density spine hip and pelvis      Narrative & Impression   CENTRAL  DXA SCAN     CLINICAL HISTORY:   79year old post-menopausal  female risk factors include past study noting low bone mass      TECHNIQUE: Bone densitometry was performed using a [employer] W bone densitometer  Regions of interest appear properly placed  There are no obvious fractures or other confounding variables which could limit the study      COMPARISON:  Follow-up     RESULTS:   LUMBAR SPINE:  L1-L4:  BMD 0 972 gm/cm2  T-score -0 7 and unchanged from 2019% higher than 2002, the latter being statistically significant change  Z-score 2 1     LEFT TOTAL HIP:  BMD 0 910 gm/cm2  T-score -0 3  Z-score 1 9     LEFT FEMORAL NECK:  BMD 0 712 gm/cm2  T-score -1 2 and unchanged from 2019 and 4% less than 2002, the latter being statistically significant change  Z-score 1 2     The left forearm BMD is 0 563 and the T score is -2 2 and 9% less than 2019  and 12% less than 2010      IMPRESSION:  1  Based on the [facility] classification, this study identifies a diagnosis of low bone mass, most notable at the forearm area and the patient is considered at low risk for fracture         2  A daily intake of calcium of at least 1200 mg and vitamin D, 800-1000 IU, as well as weight bearing and muscle strengthening exercise, fall prevention and avoidance of tobacco and excessive alcohol intake as basic preventive measures are recommended      3  Repeat DXA scan on the same equipment in 18-24 months as clinically indicated         The 10 year risk of hip fracture is 10%, with the 10 year risk of major osteoporotic fracture being 20%, as calculated by the WHO fracture risk assessment tool (FRAX)  The current NOF guidelines recommend treating patients with FRAX 10 year risk score   of >3% for hip fracture and >20% for major osteoporotic fracture      Hip fracture risk exceeds treatment thresholds         Workstation performed: [id number]          /78 (BP Location: Left arm, Patient Position: Sitting, Cuff Size: Large)   Pulse 73   Temp 97 6 °F (36 4 °C) (Temporal)   Resp 16   Ht 4' 11" (1 499 m)   Wt 80 5 kg (177 lb 6 4 oz)   SpO2 95%   BMI 35 83 kg/m²          Physical Exam  Constitutional:       General: She is not in acute distress  Appearance: Normal appearance  She is well-developed  She is obese  Comments: Looks younger than stated age   HENT:      Head: Normocephalic  Right Ear: Tympanic membrane normal       Left Ear: Tympanic membrane normal       Nose: Nose normal       Mouth/Throat:      Mouth: Mucous membranes are moist    Eyes:      Conjunctiva/sclera: Conjunctivae normal       Pupils: Pupils are equal, round, and reactive to light  Neck:      Vascular: No carotid bruit  Cardiovascular:      Rate and Rhythm: Normal rate and regular rhythm  Heart sounds: No murmur heard  Pulmonary:      Effort: Pulmonary effort is normal       Breath sounds: Normal breath sounds  Abdominal:      General: Bowel sounds are normal       Palpations: Abdomen is soft  There is no mass  Tenderness: There is no abdominal tenderness  Musculoskeletal:      Cervical back: Normal range of motion and neck supple  Comments: Gait is fairly steady without assistance   Skin:     General: Skin is warm and dry  Neurological:      Mental Status: She is alert and oriented to person, place, and time  Deep Tendon Reflexes: Reflexes are normal and symmetric  Reflexes normal       Comments: No cogwheeling noted  Minimal left hand   Psychiatric:         Mood and Affect: Mood normal          Behavior: Behavior normal          Thought Content:  Thought content normal          Judgment: Judgment normal

## 2021-10-21 ENCOUNTER — RA CDI HCC (OUTPATIENT)
Dept: OTHER | Facility: HOSPITAL | Age: 81
End: 2021-10-21

## 2021-10-28 ENCOUNTER — APPOINTMENT (OUTPATIENT)
Dept: LAB | Facility: CLINIC | Age: 81
End: 2021-10-28
Payer: MEDICARE

## 2021-10-28 ENCOUNTER — OFFICE VISIT (OUTPATIENT)
Dept: FAMILY MEDICINE CLINIC | Facility: CLINIC | Age: 81
End: 2021-10-28
Payer: MEDICARE

## 2021-10-28 VITALS
HEART RATE: 79 BPM | WEIGHT: 177 LBS | OXYGEN SATURATION: 97 % | DIASTOLIC BLOOD PRESSURE: 70 MMHG | TEMPERATURE: 96 F | BODY MASS INDEX: 34.75 KG/M2 | HEIGHT: 60 IN | SYSTOLIC BLOOD PRESSURE: 102 MMHG

## 2021-10-28 DIAGNOSIS — E78.01 FAMILIAL HYPERCHOLESTEROLEMIA: ICD-10-CM

## 2021-10-28 DIAGNOSIS — M17.0 PRIMARY OSTEOARTHRITIS OF BOTH KNEES: ICD-10-CM

## 2021-10-28 DIAGNOSIS — R10.11 RIGHT UPPER QUADRANT ABDOMINAL PAIN: ICD-10-CM

## 2021-10-28 DIAGNOSIS — E78.01 FAMILIAL HYPERCHOLESTEROLEMIA: Primary | ICD-10-CM

## 2021-10-28 DIAGNOSIS — M48.062 SPINAL STENOSIS OF LUMBAR REGION WITH NEUROGENIC CLAUDICATION: ICD-10-CM

## 2021-10-28 DIAGNOSIS — G62.89 AXONAL NEUROPATHY: ICD-10-CM

## 2021-10-28 DIAGNOSIS — E03.9 ADULT HYPOTHYROIDISM: ICD-10-CM

## 2021-10-28 LAB
CHOLEST SERPL-MCNC: 279 MG/DL (ref 50–200)
HDLC SERPL-MCNC: 79 MG/DL
LDLC SERPL CALC-MCNC: 186 MG/DL (ref 0–100)
TRIGL SERPL-MCNC: 72 MG/DL

## 2021-10-28 PROCEDURE — 36415 COLL VENOUS BLD VENIPUNCTURE: CPT

## 2021-10-28 PROCEDURE — 99214 OFFICE O/P EST MOD 30 MIN: CPT | Performed by: FAMILY MEDICINE

## 2021-10-28 PROCEDURE — 80061 LIPID PANEL: CPT

## 2021-10-28 RX ORDER — GABAPENTIN 300 MG/1
300 CAPSULE ORAL
Qty: 30 CAPSULE | Refills: 1 | Status: SHIPPED | OUTPATIENT
Start: 2021-10-28

## 2021-12-06 ENCOUNTER — TELEPHONE (OUTPATIENT)
Dept: FAMILY MEDICINE CLINIC | Facility: CLINIC | Age: 81
End: 2021-12-06

## 2021-12-20 DIAGNOSIS — E03.9 ADULT HYPOTHYROIDISM: ICD-10-CM

## 2021-12-20 RX ORDER — LEVOTHYROXINE SODIUM 0.05 MG/1
50 TABLET ORAL DAILY
Qty: 90 TABLET | Refills: 3 | Status: SHIPPED | OUTPATIENT
Start: 2021-12-20

## 2022-01-04 DIAGNOSIS — J45.20 MILD INTERMITTENT ASTHMA WITHOUT COMPLICATION: ICD-10-CM

## 2022-01-05 RX ORDER — ALBUTEROL SULFATE 90 UG/1
AEROSOL, METERED RESPIRATORY (INHALATION)
Qty: 8.5 G | Refills: 1 | Status: SHIPPED | OUTPATIENT
Start: 2022-01-05 | End: 2022-03-09

## 2022-03-02 ENCOUNTER — TELEPHONE (OUTPATIENT)
Dept: FAMILY MEDICINE CLINIC | Facility: CLINIC | Age: 82
End: 2022-03-02

## 2022-03-02 NOTE — TELEPHONE ENCOUNTER
PT is asking for blood work for her upcoming appointment on 03/14/2022  Please place and I will make patient aware

## 2022-03-09 DIAGNOSIS — J45.20 MILD INTERMITTENT ASTHMA WITHOUT COMPLICATION: ICD-10-CM

## 2022-03-09 RX ORDER — ALBUTEROL SULFATE 90 UG/1
AEROSOL, METERED RESPIRATORY (INHALATION)
Qty: 8.5 G | Refills: 1 | Status: SHIPPED | OUTPATIENT
Start: 2022-03-09

## 2022-03-14 NOTE — TELEPHONE ENCOUNTER
Pt returned the office's call she is aware lab orders are in the computer sytem as long as she goes to a [facility]  Pt advise she needs to fast 10-12 hours for blwk  Pt plans on getting labs done 1 week prior to scheduled appointment 04/15/22  Call complete

## 2022-04-05 ENCOUNTER — APPOINTMENT (OUTPATIENT)
Dept: LAB | Facility: CLINIC | Age: 82
End: 2022-04-05
Payer: MEDICARE

## 2022-04-05 DIAGNOSIS — R73.03 PREDIABETES: ICD-10-CM

## 2022-04-05 DIAGNOSIS — E78.01 FAMILIAL HYPERCHOLESTEROLEMIA: ICD-10-CM

## 2022-04-05 DIAGNOSIS — E03.9 ADULT HYPOTHYROIDISM: ICD-10-CM

## 2022-04-05 DIAGNOSIS — E55.9 VITAMIN D DEFICIENCY: ICD-10-CM

## 2022-04-05 LAB
25(OH)D3 SERPL-MCNC: 43 NG/ML (ref 30–100)
ALBUMIN SERPL BCP-MCNC: 3.8 G/DL (ref 3.5–5)
ALP SERPL-CCNC: 72 U/L (ref 46–116)
ALT SERPL W P-5'-P-CCNC: 25 U/L (ref 12–78)
ANION GAP SERPL CALCULATED.3IONS-SCNC: 6 MMOL/L (ref 4–13)
AST SERPL W P-5'-P-CCNC: 21 U/L (ref 5–45)
BILIRUB SERPL-MCNC: 0.51 MG/DL (ref 0.2–1)
BUN SERPL-MCNC: 10 MG/DL (ref 5–25)
CALCIUM SERPL-MCNC: 9.4 MG/DL (ref 8.3–10.1)
CHLORIDE SERPL-SCNC: 104 MMOL/L (ref 100–108)
CHOLEST SERPL-MCNC: 208 MG/DL
CO2 SERPL-SCNC: 28 MMOL/L (ref 21–32)
CREAT SERPL-MCNC: 0.97 MG/DL (ref 0.6–1.3)
EST. AVERAGE GLUCOSE BLD GHB EST-MCNC: 114 MG/DL
GFR SERPL CREATININE-BSD FRML MDRD: 54 ML/MIN/1.73SQ M
GLUCOSE P FAST SERPL-MCNC: 99 MG/DL (ref 65–99)
HBA1C MFR BLD: 5.6 %
HDLC SERPL-MCNC: 66 MG/DL
LDLC SERPL CALC-MCNC: 127 MG/DL (ref 0–100)
POTASSIUM SERPL-SCNC: 4.6 MMOL/L (ref 3.5–5.3)
PROT SERPL-MCNC: 7 G/DL (ref 6.4–8.2)
SODIUM SERPL-SCNC: 138 MMOL/L (ref 136–145)
T3FREE SERPL-MCNC: 2.35 PG/ML (ref 2.3–4.2)
T4 FREE SERPL-MCNC: 1.22 NG/DL (ref 0.76–1.46)
TRIGL SERPL-MCNC: 75 MG/DL
TSH SERPL DL<=0.05 MIU/L-ACNC: 1.57 UIU/ML (ref 0.45–4.5)

## 2022-04-05 PROCEDURE — 80061 LIPID PANEL: CPT

## 2022-04-05 PROCEDURE — 36415 COLL VENOUS BLD VENIPUNCTURE: CPT

## 2022-04-05 PROCEDURE — 82306 VITAMIN D 25 HYDROXY: CPT

## 2022-04-05 PROCEDURE — 84443 ASSAY THYROID STIM HORMONE: CPT

## 2022-04-05 PROCEDURE — 84481 FREE ASSAY (FT-3): CPT

## 2022-04-05 PROCEDURE — 83036 HEMOGLOBIN GLYCOSYLATED A1C: CPT

## 2022-04-05 PROCEDURE — 84439 ASSAY OF FREE THYROXINE: CPT

## 2022-04-05 PROCEDURE — 80053 COMPREHEN METABOLIC PANEL: CPT

## 2022-04-15 ENCOUNTER — OFFICE VISIT (OUTPATIENT)
Dept: FAMILY MEDICINE CLINIC | Facility: CLINIC | Age: 82
End: 2022-04-15
Payer: MEDICARE

## 2022-04-15 VITALS
HEIGHT: 59 IN | HEART RATE: 66 BPM | BODY MASS INDEX: 35.76 KG/M2 | OXYGEN SATURATION: 97 % | WEIGHT: 177.4 LBS | SYSTOLIC BLOOD PRESSURE: 126 MMHG | TEMPERATURE: 98.5 F | DIASTOLIC BLOOD PRESSURE: 78 MMHG | RESPIRATION RATE: 16 BRPM

## 2022-04-15 DIAGNOSIS — M54.16 LUMBAR RADICULOPATHY: ICD-10-CM

## 2022-04-15 DIAGNOSIS — R73.03 PREDIABETES: ICD-10-CM

## 2022-04-15 DIAGNOSIS — E78.01 FAMILIAL HYPERCHOLESTEROLEMIA: ICD-10-CM

## 2022-04-15 DIAGNOSIS — Z00.00 MEDICARE ANNUAL WELLNESS VISIT, SUBSEQUENT: Primary | ICD-10-CM

## 2022-04-15 DIAGNOSIS — E03.9 ADULT HYPOTHYROIDISM: ICD-10-CM

## 2022-04-15 PROCEDURE — 1123F ACP DISCUSS/DSCN MKR DOCD: CPT | Performed by: FAMILY MEDICINE

## 2022-04-15 PROCEDURE — G0439 PPPS, SUBSEQ VISIT: HCPCS | Performed by: FAMILY MEDICINE

## 2022-04-15 RX ORDER — SENNOSIDES 8.6 MG
650 CAPSULE ORAL EVERY 8 HOURS PRN
COMMUNITY

## 2022-04-15 NOTE — PATIENT INSTRUCTIONS
Medicare Preventive Visit Patient Instructions  Thank you for completing your Welcome to Medicare Visit or Medicare Annual Wellness Visit today  Your next wellness visit will be due in one year (4/16/2023)  The screening/preventive services that you may require over the next 5-10 years are detailed below  Some tests may not apply to you based off risk factors and/or age  Screening tests ordered at today's visit but not completed yet may show as past due  Also, please note that scanned in results may not display below  Preventive Screenings:  Service Recommendations Previous Testing/Comments   Colorectal Cancer Screening  * Colonoscopy    * Fecal Occult Blood Test (FOBT)/Fecal Immunochemical Test (FIT)  * Fecal DNA/Cologuard Test  * Flexible Sigmoidoscopy Age: 54-65 years old   Colonoscopy: every 10 years (may be performed more frequently if at higher risk)  OR  FOBT/FIT: every 1 year  OR  Cologuard: every 3 years  OR  Sigmoidoscopy: every 5 years  Screening may be recommended earlier than age 48 if at higher risk for colorectal cancer  Also, an individualized decision between you and your healthcare provider will decide whether screening between the ages of 74-80 would be appropriate  Colonoscopy: Not on file  FOBT/FIT: Not on file  Cologuard: Not on file  Sigmoidoscopy: Not on file          Breast Cancer Screening Age: 36 years old  Frequency: every 1-2 years  Not required if history of left and right mastectomy Mammogram: 07/13/2021    Screening Current   Cervical Cancer Screening Between the ages of 21-29, pap smear recommended once every 3 years  Between the ages of 33-67, can perform pap smear with HPV co-testing every 5 years     Recommendations may differ for women with a history of total hysterectomy, cervical cancer, or abnormal pap smears in past  Pap Smear: Not on file    Screening Not Indicated   Hepatitis C Screening Once for adults born between 1945 and 1965  More frequently in patients at high risk for Hepatitis C Hep C Antibody: Not on file        Diabetes Screening 1-2 times per year if you're at risk for diabetes or have pre-diabetes Fasting glucose: 99 mg/dL   A1C: 5 6 %    Screening Current   Cholesterol Screening Once every 5 years if you don't have a lipid disorder  May order more often based on risk factors  Lipid panel: 04/05/2022    Screening Not Indicated  History Lipid Disorder     Other Preventive Screenings Covered by Medicare:  1  Abdominal Aortic Aneurysm (AAA) Screening: covered once if your at risk  You're considered to be at risk if you have a family history of AAA  2  Lung Cancer Screening: covers low dose CT scan once per year if you meet all of the following conditions: (1) Age 50-69; (2) No signs or symptoms of lung cancer; (3) Current smoker or have quit smoking within the last 15 years; (4) You have a tobacco smoking history of at least 30 pack years (packs per day multiplied by number of years you smoked); (5) You get a written order from a healthcare provider  3  Glaucoma Screening: covered annually if you're considered high risk: (1) You have diabetes OR (2) Family history of glaucoma OR (3)  aged 48 and older OR (3)  American aged 72 and older  3  Osteoporosis Screening: covered every 2 years if you meet one of the following conditions: (1) You're estrogen deficient and at risk for osteoporosis based off medical history and other findings; (2) Have a vertebral abnormality; (3) On glucocorticoid therapy for more than 3 months; (4) Have primary hyperparathyroidism; (5) On osteoporosis medications and need to assess response to drug therapy  · Last bone density test (DXA Scan): 07/14/2021   5  HIV Screening: covered annually if you're between the age of 15-65  Also covered annually if you are younger than 13 and older than 72 with risk factors for HIV infection   For pregnant patients, it is covered up to 3 times per pregnancy  Immunizations:  Immunization Recommendations   Influenza Vaccine Annual influenza vaccination during flu season is recommended for all persons aged >= 6 months who do not have contraindications   Pneumococcal Vaccine (Prevnar and Pneumovax)  * Prevnar = PCV13  * Pneumovax = PPSV23   Adults 25-60 years old: 1-3 doses may be recommended based on certain risk factors  Adults 72 years old: Prevnar (PCV13) vaccine recommended followed by Pneumovax (PPSV23) vaccine  If already received PPSV23 since turning 65, then PCV13 recommended at least one year after PPSV23 dose  Hepatitis B Vaccine 3 dose series if at intermediate or high risk (ex: diabetes, end stage renal disease, liver disease)   Tetanus (Td) Vaccine - COST NOT COVERED BY MEDICARE PART B Following completion of primary series, a booster dose should be given every 10 years to maintain immunity against tetanus  Td may also be given as tetanus wound prophylaxis  Tdap Vaccine - COST NOT COVERED BY MEDICARE PART B Recommended at least once for all adults  For pregnant patients, recommended with each pregnancy  Shingles Vaccine (Shingrix) - COST NOT COVERED BY MEDICARE PART B  2 shot series recommended in those aged 48 and above     Health Maintenance Due:  There are no preventive care reminders to display for this patient  Immunizations Due:      Topic Date Due    DTaP,Tdap,and Td Vaccines (1 - Tdap) 01/14/2012     Advance Directives   What are advance directives? Advance directives are legal documents that state your wishes and plans for medical care  These plans are made ahead of time in case you lose your ability to make decisions for yourself  Advance directives can apply to any medical decision, such as the treatments you want, and if you want to donate organs  What are the types of advance directives? There are many types of advance directives, and each state has rules about how to use them   You may choose a combination of any of the following:  · Living will: This is a written record of the treatment you want  You can also choose which treatments you do not want, which to limit, and which to stop at a certain time  This includes surgery, medicine, IV fluid, and tube feedings  · Durable power of  for healthcare [facility]): This is a written record that states who you want to make healthcare choices for you when you are unable to make them for yourself  This person, called a proxy, is usually a family member or a friend  You may choose more than 1 proxy  · Do not resuscitate (DNR) order:  A DNR order is used in case your heart stops beating or you stop breathing  It is a request not to have certain forms of treatment, such as CPR  A DNR order may be included in other types of advance directives  · Medical directive: This covers the care that you want if you are in a coma, near death, or unable to make decisions for yourself  You can list the treatments you want for each condition  Treatment may include pain medicine, surgery, blood transfusions, dialysis, IV or tube feedings, and a ventilator (breathing machine)  · Values history: This document has questions about your views, beliefs, and how you feel and think about life  This information can help others choose the care that you would choose  Why are advance directives important? An advance directive helps you control your care  Although spoken wishes may be used, it is better to have your wishes written down  Spoken wishes can be misunderstood, or not followed  Treatments may be given even if you do not want them  An advance directive may make it easier for your family to make difficult choices about your care  Urinary Incontinence   Urinary incontinence (UI)  is when you lose control of your bladder  UI develops because your bladder cannot store or empty urine properly  The 3 most common types of UI are stress incontinence, urge incontinence, or both    Medicines:   · May be given to help strengthen your bladder control  Report any side effects of medication to your healthcare provider  Do pelvic muscle exercises often:  Your pelvic muscles help you stop urinating  Squeeze these muscles tight for 5 seconds, then relax for 5 seconds  Gradually work up to squeezing for 10 seconds  Do 3 sets of 15 repetitions a day, or as directed  This will help strengthen your pelvic muscles and improve bladder control  Train your bladder:  Go to the bathroom at set times, such as every 2 hours, even if you do not feel the urge to go  You can also try to hold your urine when you feel the urge to go  For example, hold your urine for 5 minutes when you feel the urge to go  As that becomes easier, hold your urine for 10 minutes  Self-care:   · Keep a UI record  Write down how often you leak urine and how much you leak  Make a note of what you were doing when you leaked urine  · Drink liquids as directed  You may need to limit the amount of liquid you drink to help control your urine leakage  Do not drink any liquid right before you go to bed  Limit or do not have drinks that contain caffeine or alcohol  · Prevent constipation  Eat a variety of high-fiber foods  Good examples are high-fiber cereals, beans, vegetables, and whole-grain breads  Walking is the best way to trigger your intestines to have a bowel movement  · Exercise regularly and maintain a healthy weight  Weight loss and exercise will decrease pressure on your bladder and help you control your leakage  · Use a catheter as directed  to help empty your bladder  A catheter is a tiny, plastic tube that is put into your bladder to drain your urine  · Go to behavior therapy as directed  Behavior therapy may be used to help you learn to control your urge to urinate      Weight Management   Why it is important to manage your weight:  Being overweight increases your risk of health conditions such as heart disease, high blood pressure, type 2 diabetes, and certain types of cancer  It can also increase your risk for osteoarthritis, sleep apnea, and other respiratory problems  Aim for a slow, steady weight loss  Even a small amount of weight loss can lower your risk of health problems  How to lose weight safely:  A safe and healthy way to lose weight is to eat fewer calories and get regular exercise  You can lose up about 1 pound a week by decreasing the number of calories you eat by 500 calories each day  Healthy meal plan for weight management:  A healthy meal plan includes a variety of foods, contains fewer calories, and helps you stay healthy  A healthy meal plan includes the following:  · Eat whole-grain foods more often  A healthy meal plan should contain fiber  Fiber is the part of grains, fruits, and vegetables that is not broken down by your body  Whole-grain foods are healthy and provide extra fiber in your diet  Some examples of whole-grain foods are whole-wheat breads and pastas, oatmeal, brown rice, and bulgur  · Eat a variety of vegetables every day  Include dark, leafy greens such as spinach, kale, charity greens, and mustard greens  Eat yellow and orange vegetables such as carrots, sweet potatoes, and winter squash  · Eat a variety of fruits every day  Choose fresh or canned fruit (canned in its own juice or light syrup) instead of juice  Fruit juice has very little or no fiber  · Eat low-fat dairy foods  Drink fat-free (skim) milk or 1% milk  Eat fat-free yogurt and low-fat cottage cheese  Try low-fat cheeses such as mozzarella and other reduced-fat cheeses  · Choose meat and other protein foods that are low in fat  Choose beans or other legumes such as split peas or lentils  Choose fish, skinless poultry (chicken or turkey), or lean cuts of red meat (beef or pork)  Before you cook meat or poultry, cut off any visible fat  · Use less fat and oil  Try baking foods instead of frying them   Add less fat, such as margarine, sour cream, regular salad dressing and mayonnaise to foods  Eat fewer high-fat foods  Some examples of high-fat foods include french fries, doughnuts, ice cream, and cakes  · Eat fewer sweets  Limit foods and drinks that are high in sugar  This includes candy, cookies, regular soda, and sweetened drinks  Exercise:  Exercise at least 30 minutes per day on most days of the week  Some examples of exercise include walking, biking, dancing, and swimming  You can also fit in more physical activity by taking the stairs instead of the elevator or parking farther away from stores  Ask your healthcare provider about the best exercise plan for you  © Copyright [employer] 2018 Information is for End User's use only and may not be sold, redistributed or otherwise used for commercial purposes   All illustrations and images included in CareNotes® are the copyrighted property of A [employer] A M [employer]  or [address] [place] [address]

## 2022-04-15 NOTE — PROGRESS NOTES
Assessment and Plan:   Ryan Mullins was seen today for medicare wellness visit and follow-up  Diagnoses and all orders for this visit:    Medicare annual wellness visit, subsequent    Prediabetes    Adult hypothyroidism    Lumbar radiculopathy    Familial hypercholesterolemia      Problem List Items Addressed This Visit        Endocrine    Adult hypothyroidism       Nervous and Auditory    Lumbar radiculopathy       Other    Hyperlipemia    Prediabetes      Other Visit Diagnoses     Medicare annual wellness visit, subsequent    -  Primary        BMI Counseling: Body mass index is 35 76 kg/m²  The BMI is above normal  Nutrition recommendations include decreasing portion sizes, encouraging healthy choices of fruits and vegetables, decreasing fast food intake, consuming healthier snacks, limiting drinks that contain sugar, moderation in carbohydrate intake, increasing intake of lean protein, reducing intake of saturated and trans fat and reducing intake of cholesterol  Exercise recommendations include exercising 3-5 times per week  Rationale for BMI follow-up plan is due to patient being overweight or obese  Depression Screening and Follow-up Plan: Patient was screened for depression during today's encounter  They screened negative with a PHQ-2 score of 0  Preventive health issues were discussed with patient, and age appropriate screening tests were ordered as noted in patient's After Visit Summary  Personalized health advice and appropriate referrals for health education or preventive services given if needed, as noted in patient's After Visit Summary  History of Present Illness:     Chief Complaint   Patient presents with    Medicare Wellness Visit    Follow-up     6 month followup      Patient presents for Welcome to Medicare visit       Patient Care Team:  Angeli Bunch DO as PCP - General  Andrena Barry, MD Stuart Copa, DO Shirleen Cushing, DO     Review of Systems:     Component      Latest Ref Rng & Units 4/5/2022   Sodium      136 - 145 mmol/L 138   Potassium      3 5 - 5 3 mmol/L 4 6   Chloride      100 - 108 mmol/L 104   CO2      21 - 32 mmol/L 28   Anion Gap      4 - 13 mmol/L 6   BUN      5 - 25 mg/dL 10   Creatinine      0 60 - 1 30 mg/dL 0 97   GLUCOSE FASTING      65 - 99 mg/dL 99   Calcium      8 3 - 10 1 mg/dL 9 4   AST      5 - 45 U/L 21   ALT      12 - 78 U/L 25   Alkaline Phosphatase      46 - 116 U/L 72   Total Protein      6 4 - 8 2 g/dL 7 0   Albumin      3 5 - 5 0 g/dL 3 8   TOTAL BILIRUBIN      0 20 - 1 00 mg/dL 0 51   eGFR      ml/min/1 73sq m 54   Cholesterol      See Comment mg/dL 208 (H)   Triglycerides      See Comment mg/dL 75   HDL      >=50 mg/dL 66   LDL Calculated      0 - 100 mg/dL 127 (H)   Hemoglobin A1C      Normal 3 8-5 6%; PreDiabetic 5 7-6 4%; Diabetic >=6 5%; Glycemic control for adults with diabetes <7 0% % 5 6   eAG, EST AVG Glucose      mg/dl 114   Vit D, 25-Hydroxy      30 0 - 100 0 ng/mL 43 0   T3, Free      2 30 - 4 20 pg/mL 2 35   Free T4      0 76 - 1 46 ng/dL 1 22   TSH 3RD GENERATON      0 450 - 4 500 uIU/mL 1 570     Review of Systems   Gastrointestinal: Positive for abdominal pain (epigastric---better)  has had imaging of abdomen 2018: IMPRESSION:    Increased hepatic echogenicity suggesting fatty infiltration  Patchy heterogeneous echogenicity posteromedial right hepatic lobe appears to correspond to an area of cavernous hemangioma seen on prior CT imaging      Small left hepatic lobe cyst      Otherwise, unremarkable abdominal ultrasound     Problem List:     Patient Active Problem List   Diagnosis    Adult hypothyroidism    Glaucoma    Heel spur, left    Hyperlipemia    Vitamin D deficiency    Primary osteoarthritis of both knees    Peripheral neuropathy    Meningioma (HCC)    Lumbar radiculopathy    Spinal stenosis of lumbar region    Right upper quadrant abdominal pain    Fatty liver    Cavernous hemangioma of liver    Asthma    Autoimmune disease ([facility] [address] )    Disorder of lipid metabolism    Disorder of uterus    Polyp of corpus uteri    Spondylolisthesis, acquired    Localized, primary osteoarthritis    Lumbosacral radiculitis    Pseudoclaudication syndrome    Rupture of right long head biceps tendon    Genetic testing    Family history of pancreatic cancer    Prediabetes    Axonal neuropathy    Postmenopausal bleeding    Stage 3b chronic kidney disease (HCC)      Past Medical and Surgical History:     Past Medical History:   Diagnosis Date    Arthritis     Colon polyps     Disease of thyroid gland     Hyperlipidemia     Migraine     Pure hypercholesterolemia     Thyroid disease      Past Surgical History:   Procedure Laterality Date    DILATION AND CURETTAGE, DIAGNOSTIC / THERAPEUTIC      MAMMO STEREOTACTIC BREAST BIOPSY RIGHT (ALL INC) Right     yrs ago-benign    TONSILLECTOMY      TUBAL LIGATION        Family History:     Family History   Problem Relation Age of Onset    Pancreatic cancer Mother 79        susceptibility     Kidney disease Father     Pancreatic cancer Maternal Aunt         over 48    Pancreatic cancer Paternal Aunt         over 48    No Known Problems Maternal Grandmother     No Known Problems Paternal Grandmother     No Known Problems Maternal Aunt     No Known Problems Maternal Aunt     No Known Problems Maternal Aunt     No Known Problems Paternal Aunt       Social History:     Social History     Socioeconomic History    Marital status:      Spouse name: None    Number of children: None    Years of education: None    Highest education level: None   Occupational History    None   Tobacco Use    Smoking status: Former Smoker     Types: Cigarettes     Quit date:      Years since quittin 3    Smokeless tobacco: Never Used   Vaping Use    Vaping Use: Never used   Substance and Sexual Activity    Alcohol use:  Yes     Alcohol/week: 1 0 standard drink     Types: 1 Glasses of wine per week     Comment: social     Drug use: No    Sexual activity: None   Other Topics Concern    None   Social History Narrative    Patient education -Asthma resolved     Caffeine use      Social Determinants of Health     Financial Resource Strain: Not on file   Food Insecurity: Not on file   Transportation Needs: Not on file   Physical Activity: Not on file   Stress: Not on file   Social Connections: Not on file   Intimate Partner Violence: Not on file   Housing Stability: Not on file      Medications and Allergies:     Current Outpatient Medications   Medication Sig Dispense Refill    acetaminophen (TYLENOL) 650 mg CR tablet Take 650 mg by mouth every 8 (eight) hours as needed for mild pain      albuterol (PROVENTIL HFA,VENTOLIN HFA) 90 mcg/act inhaler INHALE 2 PUFFS BY MOUTH EVERY 6 HOURS AS NEEDED FOR WHEEZING OR SHORTNESS OF BREATH 8 5 g 1    bimatoprost (LUMIGAN) 0 01 % ophthalmic drops Administer 1 drop to both eyes daily at bedtime        Cholecalciferol (VITAMIN D) 2000 units CAPS Take 1 tablet by mouth daily      cyanocobalamin (VITAMIN B-12) 1000 MCG tablet Take 1 tablet by mouth in the morning      gabapentin (NEURONTIN) 300 mg capsule Take 1 capsule (300 mg total) by mouth daily at bedtime 30 capsule 1    levothyroxine 50 mcg tablet TAKE 1 TABLET (50 MCG TOTAL) BY MOUTH DAILY 90 tablet 3    Multiple Vitamins-Minerals (EMERGEN-C IMMUNE PO) Vitamin C   daily      pravastatin (PRAVACHOL) 20 mg tablet TAKE 1 TABLET (20 MG TOTAL) BY MOUTH DAILY (Patient taking differently: Take 20 mg by mouth every other day  ) 90 tablet 1     No current facility-administered medications for this visit  Allergies   Allergen Reactions    Molds & Smuts Shortness Of Breath    Penicillins Hives, Rash and Edema     Category: Allergy; Other reaction(s): Other (see comments)  Category:  Allergy;       Immunizations:     Immunization History   Administered Date(s) Administered    COVID-19 MODERNA VACC 0 5 ML IM 01/25/2021, 02/25/2021, 10/29/2021    INFLUENZA 11/14/2012, 11/06/2013, 09/23/2020, 10/06/2021    Influenza Split High Dose Preservative Free IM 11/01/2012, 09/18/2015, 09/15/2016, 10/30/2016, 09/22/2017    Influenza, high dose seasonal 0 7 mL 10/09/2018    Pneumococcal Conjugate 13-Valent 01/19/2018    Pneumococcal Conjugate PCV 7 06/19/2015    Pneumococcal Polysaccharide PPV23 07/13/2008, 09/23/2020    Rabies, Intramuscular 05/27/2018, 05/30/2018, 06/03/2018, 06/10/2018    TD (adult) Preservative Free 01/13/2012    Zoster 01/13/2010    Zoster Vaccine Recombinant 03/06/2020, 07/22/2020    influenza, trivalent, adjuvanted 10/11/2019      Health Maintenance: There are no preventive care reminders to display for this patient  There are no preventive care reminders to display for this patient  Medicare Screening Tests and Risk Assessments:     Jamil Vasques is here for her Subsequent Wellness visit  Health Risk Assessment:   Patient rates overall health as very good  Patient feels that their physical health rating is same  Patient is very satisfied with their life  Eyesight was rated as same  Hearing was rated as same  Patient feels that their emotional and mental health rating is same  Patients states they are never, rarely angry  Patient states they are sometimes unusually tired/fatigued  Pain experienced in the last 7 days has been some  Patient's pain rating has been 5/10  Patient states that she has experienced no weight loss or gain in last 6 months  Depression Screening:   PHQ-2 Score: 0      Fall Risk Screening: In the past year, patient has experienced: no history of falling in past year      Urinary Incontinence Screening:   Patient has leaked urine accidently in the last six months  Home Safety:  Patient has trouble with stairs inside or outside of their home  Patient has working smoke alarms and has working carbon monoxide detector  Home safety hazards include: none  Nutrition:   Current diet is No Added Salt and Regular  Medications:   Patient is currently taking over-the-counter supplements  OTC medications include: see medication list  Patient is able to manage medications  Activities of Daily Living (ADLs)/Instrumental Activities of Daily Living (IADLs):   Walk and transfer into and out of bed and chair?: Yes  Dress and groom yourself?: Yes    Bathe or shower yourself?: Yes    Feed yourself? Yes  Do your laundry/housekeeping?: Yes  Manage your money, pay your bills and track your expenses?: Yes  Make your own meals?: Yes    Do your own shopping?: Yes    Previous Hospitalizations:   Any hospitalizations or ED visits within the last 12 months?: No      Advance Care Planning:   Living will: Yes    Durable POA for healthcare: Yes    Advanced directive: Yes      PREVENTIVE SCREENINGS      Cardiovascular Screening:    General: Screening Not Indicated and History Lipid Disorder      Diabetes Screening:     General: Screening Current      Breast Cancer Screening:     General: Screening Current      Cervical Cancer Screening:    General: Screening Not Indicated      Osteoporosis Screening:    General: Screening Not Indicated and History Osteoporosis      Lung Cancer Screening:     General: Screening Not Indicated    Screening, Brief Intervention, and Referral to Treatment (SBIRT)    Screening  Typical number of drinks in a day: 0  Typical number of drinks in a week: 0  Interpretation: Low risk drinking behavior      Single Item Drug Screening:  How often have you used an illegal drug (including marijuana) or a prescription medication for non-medical reasons in the past year? never    Single Item Drug Screen Score: 0  Interpretation: Negative screen for possible drug use disorder         Physical Exam:     /78   Pulse 66   Temp 98 5 °F (36 9 °C) (Temporal)   Resp 16   Ht 4' 11 06" (1 5 m)   Wt 80 5 kg (177 lb 6 4 oz)   SpO2 97%   BMI 35 76 kg/m²     Physical Exam  Vitals and nursing note reviewed  Constitutional:       General: She is not in acute distress  Appearance: Normal appearance  She is well-developed  HENT:      Head: Normocephalic and atraumatic  Right Ear: Tympanic membrane normal       Left Ear: Tympanic membrane normal       Nose: Nose normal       Mouth/Throat:      Mouth: Mucous membranes are moist    Eyes:      Pupils: Pupils are equal, round, and reactive to light  Neck:      Vascular: No carotid bruit  Cardiovascular:      Rate and Rhythm: Normal rate and regular rhythm  Pulses: Normal pulses  Heart sounds: No murmur heard  Pulmonary:      Effort: Pulmonary effort is normal  No respiratory distress  Breath sounds: Normal breath sounds  Abdominal:      Palpations: Abdomen is soft  Tenderness: There is no abdominal tenderness  Musculoskeletal:      Cervical back: Neck supple  Skin:     General: Skin is warm  Neurological:      Mental Status: She is alert and oriented to person, place, and time  Psychiatric:         Mood and Affect: Mood normal          Behavior: Behavior normal          Thought Content: Thought content normal          Judgment: Judgment normal           Melo Care, DO  BMI Counseling: Body mass index is 35 76 kg/m²  The BMI is above normal  Nutrition recommendations include reducing portion sizes, decreasing overall calorie intake, 3-5 servings of fruits/vegetables daily, reducing fast food intake, consuming healthier snacks, decreasing soda and/or juice intake, moderation in carbohydrate intake, increasing intake of lean protein, reducing intake of saturated fat and trans fat and reducing intake of cholesterol  Exercise recommendations include exercising 3-5 times per week

## 2022-04-18 DIAGNOSIS — E78.49 OTHER HYPERLIPIDEMIA: ICD-10-CM

## 2022-04-18 RX ORDER — PRAVASTATIN SODIUM 20 MG
20 TABLET ORAL EVERY OTHER DAY
Qty: 90 TABLET | Refills: 3 | Status: SHIPPED | OUTPATIENT
Start: 2022-04-18

## 2022-05-27 DIAGNOSIS — H40.1130 PRIMARY OPEN ANGLE GLAUCOMA OF BOTH EYES, UNSPECIFIED GLAUCOMA STAGE: Primary | ICD-10-CM

## 2022-05-27 RX ORDER — BIMATOPROST 0.01 %
DROPS OPHTHALMIC (EYE)
Qty: 7.5 ML | Refills: 2 | Status: SHIPPED | OUTPATIENT
Start: 2022-05-27

## 2022-06-23 ENCOUNTER — TELEPHONE (OUTPATIENT)
Dept: FAMILY MEDICINE CLINIC | Facility: CLINIC | Age: 82
End: 2022-06-23

## 2022-06-23 DIAGNOSIS — R10.10 PAIN OF UPPER ABDOMEN: Primary | ICD-10-CM

## 2022-06-23 NOTE — TELEPHONE ENCOUNTER
I called and spoke with the Pt and made her aware, she verbalized understanding she will call and schedule  She asked I also mail the referral to her

## 2022-06-23 NOTE — TELEPHONE ENCOUNTER
Yes I do see that we talked about it at her Medicare wellness  She had said that it was better at that time  We did will get previous scan  I think we discussed may be having to see Gastroenterology in having an upper scope/EGD    I do not believe she ever had 1 although I did not look everywhere on the chart

## 2022-06-23 NOTE — TELEPHONE ENCOUNTER
Patient states she agrees if you may please place referral with Dr Chacho Aly patient seen that doctor before

## 2022-06-23 NOTE — TELEPHONE ENCOUNTER
I can technically writer referral   Dr Hurley Monday is with [facility] and they are not on [facility] so they will see referral    She can call and schedule on her own if she does have the phone number

## 2022-06-23 NOTE — TELEPHONE ENCOUNTER
Patient called stating she spoke to you regarding her pain in her upper stomach area  She would like to know if she needs an appointment due to her still having this pain or if she needs to do something else  Please advise patient at [phone]

## 2022-07-08 ENCOUNTER — OFFICE VISIT (OUTPATIENT)
Dept: FAMILY MEDICINE CLINIC | Facility: CLINIC | Age: 82
End: 2022-07-08
Payer: MEDICARE

## 2022-07-08 VITALS
BODY MASS INDEX: 35.48 KG/M2 | OXYGEN SATURATION: 96 % | TEMPERATURE: 96 F | WEIGHT: 176 LBS | HEART RATE: 88 BPM | HEIGHT: 59 IN | DIASTOLIC BLOOD PRESSURE: 68 MMHG | SYSTOLIC BLOOD PRESSURE: 142 MMHG

## 2022-07-08 DIAGNOSIS — S40.022A CONTUSION OF BOTH UPPER EXTREMITIES, INITIAL ENCOUNTER: ICD-10-CM

## 2022-07-08 DIAGNOSIS — M17.0 PRIMARY OSTEOARTHRITIS OF BOTH KNEES: ICD-10-CM
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
## 2022-07-08 DIAGNOSIS — S80.12XA CONTUSION OF LEFT LOWER EXTREMITY, INITIAL ENCOUNTER: ICD-10-CM

## 2022-07-08 DIAGNOSIS — S05.11XA TRAUMATIC ECCHYMOSIS OF ORBITAL RIM, RIGHT, INITIAL ENCOUNTER: Primary | ICD-10-CM

## 2022-07-08 DIAGNOSIS — R26.89 BALANCE DISORDER: ICD-10-CM
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
## 2022-07-08 DIAGNOSIS — S40.021A CONTUSION OF BOTH UPPER EXTREMITIES, INITIAL ENCOUNTER: ICD-10-CM

## 2022-07-08 PROCEDURE — 99214 OFFICE O/P EST MOD 30 MIN: CPT | Performed by: FAMILY MEDICINE

## 2022-07-08 NOTE — PROGRESS NOTES
Assessment/Plan:     Diagnoses and all orders for this visit:    Traumatic ecchymosis of orbital rim, right, initial encounter    Contusion of both upper extremities, initial encounter    Contusion of left lower extremity, initial encounter    Fall at home, initial encounter    Balance disorder  -     Ambulatory Referral to Physical Therapy; Future    Primary osteoarthritis of both knees  -     Ambulatory Referral to Physical Therapy; Future    Other orders  -     co-enzyme Q-10 30 MG capsule; Take 30 mg by mouth daily          Subjective:   Chief Complaint   Patient presents with   Donald Abt Fall     Trip and Fall on 7/6 bruised eye and knee       Patient ID: Rosa Carey is a 79 y o  female  Patient is a pleasant 26-year-old female who is here for evaluation after a fall at home  She was in the midst of getting her cat  to the vet  Fall  The accident occurred 2 days ago  The fall occurred while standing  She fell from a height of 1 to 2 ft  The point of impact was the face, left shoulder and left knee (Left thumb)  Pain location: Mild periorbital  The pain is mild  The symptoms are aggravated by ambulation (Patient does have underlying DJD knees)  Pertinent negatives include no loss of consciousness or visual change  She has tried ice for the symptoms  The treatment provided moderate relief  Was trying to get the Cat carrier in the cart  She was bare foot as usual at home , toe caught on the wheel and "I went down"  Head hit plastic container, bruise on left knee and shin and right knee and left upper arm  Got ice on areas right away   Got tremulous  Called her eye doctor--- not available  Told to come in to be seen by her PCP  She has no visual disturbance  Wanted to update me on her back and her knees  Also sees [facility] Dr Berta Whelan and Dr Kael Moran not helpful  Would like referral to PT for her knees as well as balance        The following portions of the patient's history were reviewed and updated as appropriate: allergies, current medications, past family history, past medical history, past social history, past surgical history and problem list       Review of Systems   Constitutional: Negative for fatigue  Eyes: Negative for visual disturbance  Gastrointestinal: Negative  Genitourinary: Negative for dysuria  Musculoskeletal: Positive for arthralgias (Knees), back pain and gait problem  Fall as noted   Neurological: Negative for loss of consciousness  Hematological: Bruises/bleeds easily  Psychiatric/Behavioral: Negative for sleep disturbance  All other systems reviewed and are negative  Objective:      /68   Pulse 88   Temp (!) 96 °F (35 6 °C) (Temporal)   Ht 4' 11" (1 499 m)   Wt 79 8 kg (176 lb)   SpO2 96%   BMI 35 55 kg/m²          Physical Exam  Constitutional:       General: She is not in acute distress  Appearance: Normal appearance  She is well-developed  HENT:      Head: Normocephalic  Comments: Perla or double ecchymosis without significant swelling right side, mild tenderness over lower orbital ridge  Eyes:      Extraocular Movements: Extraocular movements intact  Conjunctiva/sclera: Conjunctivae normal       Pupils: Pupils are equal, round, and reactive to light  Cardiovascular:      Rate and Rhythm: Normal rate and regular rhythm  Heart sounds: No murmur heard  Pulmonary:      Effort: Pulmonary effort is normal       Breath sounds: Normal breath sounds  Abdominal:      General: Bowel sounds are normal       Palpations: Abdomen is soft  There is no mass  Tenderness: There is no abdominal tenderness  Musculoskeletal:         General: Normal range of motion  Cervical back: Normal range of motion and neck supple  No rigidity  Skin:     General: Skin is warm and dry  Findings: Bruising (Noted on left upper outer her arm as well as left knee and pretibial area    Also ecchymosis in the thenar eminence left thumb with out deformity year range of motion issue  Nontender ) present  Neurological:      Mental Status: She is alert and oriented to person, place, and time  Deep Tendon Reflexes: Reflexes are normal and symmetric  Psychiatric:         Mood and Affect: Mood normal          Behavior: Behavior normal          Thought Content:  Thought content normal          Judgment: Judgment normal

## 2022-07-08 NOTE — PATIENT INSTRUCTIONS
Physical Activity for Older Adults   AMBULATORY CARE:   What you need to know about physical activity and aging:  Physical activity can help you get or stay physically and mentally healthy as you age  You may be able to do your daily activities more easily  If you have arthritis, your joints may move more easily and with less pain  Your bones and muscles will get stronger  Strength and better balance help prevent osteoporosis and reduce your risk for falls  Regular activity can improve your mood and appetite, and help you sleep better  Your risk for diseases such as cancer, heart disease, diabetes, and stroke will be lower  Activity can help you control your blood pressure and blood sugar levels, and lower your cholesterol  Activity can also slow or prevent cognitive (thinking) problems as you age  Call your doctor or physical therapist if:   You have pain with any physical activity  You have questions or concerns about physical activity or your health  Physical activity safety:   Talk to your healthcare provider before you start doing physical activities  Your provider will check your general health  He or she may recommend you avoid certain activities based on your health  He or she will also make sure your physical activity plan works with any medicines you are taking  Some medicines can make you sleepy or cause balance problems  These can make certain physical activities less safe  Start slowly and work up to more activity  It is important not to become highly active too quickly  Your body will need time to adjust  For example, you can cause serious injury if you try to lift a weight that is too heavy  Your healthcare provider can help you create a plan  The plan may start with a few minutes of physical activity on certain days of the week  You can add activities slowly over time  This may include making sessions longer, or being active on more days of the week   You can also add more weight as you get stronger  Do a variety of activities throughout the week  Do conditioning, strength training, flexibility, and balance activities  General guidelines are included below for how much of each activity to get  Your healthcare provider or physical therapist may give you specific instructions to follow  Stretch before and after you do any conditioning or strengthening activities  You can also do stretching activities on days you do not do any other kind of activity  Move slowly and smoothly  Avoid fast or jerky motions to help prevent an injury  Do exercises and stretches on both sides  This helps the muscles on both sides remain strong and flexible  Stop if you feel sharp pain or an increase in pain  Stop the exercise and contact your healthcare provider if you have these symptoms  It is normal to feel some discomfort, such as a dull ache, during exercise  Regular exercise will help decrease your discomfort over time  Drink liquid before, during, and after activity  Liquid helps prevent dehydration during exercise  Dehydration can cause you to become dizzy or to fall  Liquids are especially important on hot days  Activities that help improve flexibility:  You can improve your flexibility by doing stretching activities  Only stretch far enough that you feel the stretch but do not feel pain  Hold the stretch for 30 to 60 seconds, or for as long as directed  Repeat the stretch 2 or 3 times before you move to the next body area  Breathe normally  Do not hold your breath  It is important to breathe in and out so you do not tense up during exercise  Tension could prevent your muscles from stretching  The following are examples of flexibility activities:  Crossover arm stretch:  Relax your shoulders  Hold your upper arm with the opposite hand  Pull your arm across your chest until you feel a stretch  Hold the stretch for 30 seconds  Return to the starting position           Back stretch:  Lie on your back with your hands behind your head  Bend your knees and turn the lower half of your body to one side  Hold this position for 10 seconds  Repeat 3 times on each side  Hip stretch:  Lie on the ground  Place both hands on the shin of 1 leg  Pull your knee toward your chest  Repeat on the other side  Standing quadriceps stretch:  Stand and place one hand against a wall or hold the back of a chair for balance  With your weight on one leg, bend your other leg and grab your ankle  Pull your heel toward your buttocks  Standing hamstring stretch:  Stand with your feet hips distance apart  Life 1 leg and rest it on a firm surface, such as a table or chair  Keep your toes pointing up  Slide your hands forward along the side of your leg until you feel a stretch  Keep your chest lifted and your back straight  Hold for 30 seconds  Activities that help improve balance:  Good balance can help you prevent falls  Do balance activities a few times each week  You might want to ask someone to help you while you do these activities  The person can help make sure you do not fall  Make sure your path or activity area is clear of objects before you begin  The following are examples of activities that help improve balance:  Balance on 1 foot:  Hold something sturdy, such as a wall, chair, or walker  Balance your weight on both feet first  Then slowly lift 1 foot off the ground  When you have your balance, you can try letting go of the sturdy object  Make sure it is available for you to hold again if needed  Try to stand on 1 foot for about 10 seconds  Then repeat on the other foot  Walking in a straight line:  Put your weight equally on both feet to start  Make sure you have your balance  Then walk forward slowly, putting 1 foot directly in front of the other  Walking backward: If no one is available to stand next to you, stand next to a wall   Lean against the wall, and keep your arm on the wall as you walk  Make sure you have your balance  Then walk backward  Go slowly  Make sure you have good balance before you take the next step back  Evangelista Chi:  Evangelista Chi combines slow, precise movements with deep breathing and meditation  Classes may be available  An instructor will show you how to do the moves  Activities that help improve conditioning:  Conditioning includes activities that increase your heart rate and breathing  Activity that uses body weight is called weightbearing exercise  Examples include walking, dancing, and raking leaves  Weightbearing activities help strengthen bones  Nonweightbearing activities include riding a bicycle and swimming  Aim for 150 to 300 minutes (2 5 to 5 hours) of moderate aerobic activity each week  The following are examples of activities that help improve conditioning:  Walking:  Walking is a good, low-impact exercise  If you are not able to go outdoors, you can still walk in your home  Make sure the walking path in your home is clear and has good lighting  You can also march in place  Chair exercises:  Chair exercises are a safe way to move if you have balance problems  Sit in a hard chair that has a back  Keep your feet on the ground when you work your arms  Hold the seat or arms of the chair when you work your legs  You can lift weights, use a resistance band, hold an object such as a soup can, or just move your arms and legs  Chair exercise group classes may also be available  An instructor shows you moves to do while you sit in the chair  Water aerobics:  Water creates resistance  This means it is harder to move in water than it is on dry ground  Water aerobics can help prevent falls while you exercise  You will raise your heart rate and breathing just by walking along the bottom of the pool through the water  You can also  place and work your upper body   Hold pool equipment such as a flotation device or pool noodle to add weight to your activity  Water aerobics group classes may also be available  An instructor shows you moves to do in the water  Activities that help build strength:  Strength training helps you keep the muscles you have and build new muscles  Strong muscles help protect your bones  You can also improve your balance by strengthening your leg, back, and core (abdomen) muscles  If you do not have wights or resistance bands, you can use household items, such as soup cans  You can stand or sit in a chair or wheelchair when you do strength training  Try to work all the major muscle groups, such as your legs, arms, abdomen, and chest  Do strength training at least 2 times each week  Spread the days out so you are not doing strength training 2 days in a row  You can cause injury if you do not rest muscles in between sessions  The following are examples of strength training exercises:  Weightlifting:  Start with a weight you can lift easily  You can work up to [employer]  Do not try to lift heavy weights right away  You might cause a muscle or tendon injury  Repeat each move until you cannot do it even 1 more time  That is 1 set  Do 2 or 3 sets on each area  Resistance training: The ends of a resistance band can be held in your hands  You can tie 1 end to a sturdy object  Repeat each move until you cannot do it even 1 more time  That is 1 set  Do 2 or 3 sets on each area  Other activities:  Work such as digging and shoveling can strengthen muscles  Exercises such as push-ups, sit-ups, or squats can also build muscles  Tips to help you stay on track:   Start slowly and work up  You do not have to do all the activity at one time  You can do a few minutes at a time  Remember that some physical activity is better than none  Stand up during the day, even if you cannot walk around  Your body uses more energy when you stand   When you do conditioning activities, aim for a speed that is challenging but not too difficult  You should be able to speak a few words at a time but not be able to sing  Plan activities you enjoy  Do a variety of activities so you stay challenged  You do not have to do regular exercises to be active  Walk outdoors, go shopping, or visit a museum  The more you enjoy your activities, the easier it will be to continue doing them  Ask for support from the people in your life  Go for a walk after dinner with your family  Meet friends at the park  Find someone who likes the same classes you like  Make plans to attend class together  You may be more likely to go if you know another person is counting on you to be there  Get involved in community events, such as cleaning a community park  Ask someone to help you stay on track  For example, you can tell the person about your daily or weekly activity  What you need to know about nutrition and activity:  Healthy foods will give you the energy you need to be active  Activity and good nutrition work together to help you reach or maintain a healthy weight  Healthy foods include fruits, vegetables, lean meats, fish, cooked beans, whole-grain breads, and low-fat dairy products  Your healthcare provider can help you create a healthy meal plan  He or she can tell you how many calories you need to stay active and still lose weight if needed  Follow up with your doctor or physical therapist as directed:  Write down your questions so you remember to ask them during your visits  © Copyright [employer] 2022 Information is for End User's use only and may not be sold, redistributed or otherwise used for commercial purposes  All illustrations and images included in CareNotes® are the copyrighted property of [employer]  or [facility] Susan Hoyt   The above information is an  only  It is not intended as medical advice for individual conditions or treatments   Talk to your doctor, nurse or pharmacist before following any medical regimen to see if it is safe and effective for you

## 2022-07-21 ENCOUNTER — APPOINTMENT (OUTPATIENT)
Dept: LAB | Facility: CLINIC | Age: 82
End: 2022-07-21
Payer: MEDICARE

## 2022-07-21 DIAGNOSIS — N18.31 CHRONIC KIDNEY DISEASE, STAGE 3A (HCC): ICD-10-CM

## 2022-07-21 LAB
ALBUMIN SERPL BCP-MCNC: 3.6 G/DL (ref 3.5–5)
ANION GAP SERPL CALCULATED.3IONS-SCNC: 5 MMOL/L (ref 4–13)
BUN SERPL-MCNC: 13 MG/DL (ref 5–25)
CALCIUM SERPL-MCNC: 9.4 MG/DL (ref 8.3–10.1)
CHLORIDE SERPL-SCNC: 107 MMOL/L (ref 96–108)
CO2 SERPL-SCNC: 27 MMOL/L (ref 21–32)
CREAT SERPL-MCNC: 0.88 MG/DL (ref 0.6–1.3)
GFR SERPL CREATININE-BSD FRML MDRD: 61 ML/MIN/1.73SQ M
GLUCOSE P FAST SERPL-MCNC: 87 MG/DL (ref 65–99)
HCT VFR BLD AUTO: 40.2 % (ref 34.8–46.1)
HGB BLD-MCNC: 12.7 G/DL (ref 11.5–15.4)
MAGNESIUM SERPL-MCNC: 2.5 MG/DL (ref 1.6–2.6)
PHOSPHATE SERPL-MCNC: 3.5 MG/DL (ref 2.3–4.1)
POTASSIUM SERPL-SCNC: 4.3 MMOL/L (ref 3.5–5.3)
SODIUM SERPL-SCNC: 139 MMOL/L (ref 135–147)

## 2022-07-21 PROCEDURE — 85018 HEMOGLOBIN: CPT

## 2022-07-21 PROCEDURE — 36415 COLL VENOUS BLD VENIPUNCTURE: CPT

## 2022-07-21 PROCEDURE — 85014 HEMATOCRIT: CPT

## 2022-07-21 PROCEDURE — 83735 ASSAY OF MAGNESIUM: CPT

## 2022-07-21 PROCEDURE — 80069 RENAL FUNCTION PANEL: CPT

## 2022-08-18 ENCOUNTER — APPOINTMENT (OUTPATIENT)
Dept: LAB | Facility: CLINIC | Age: 82
End: 2022-08-18
Payer: MEDICARE

## 2022-08-18 DIAGNOSIS — N18.31 STAGE 3A CHRONIC KIDNEY DISEASE (HCC): ICD-10-CM

## 2022-08-18 DIAGNOSIS — R60.0 LOCALIZED EDEMA: ICD-10-CM

## 2022-08-18 LAB
ANION GAP SERPL CALCULATED.3IONS-SCNC: 5 MMOL/L (ref 4–13)
BUN SERPL-MCNC: 8 MG/DL (ref 5–25)
CALCIUM SERPL-MCNC: 9.7 MG/DL (ref 8.3–10.1)
CHLORIDE SERPL-SCNC: 103 MMOL/L (ref 96–108)
CO2 SERPL-SCNC: 28 MMOL/L (ref 21–32)
CREAT SERPL-MCNC: 0.89 MG/DL (ref 0.6–1.3)
GFR SERPL CREATININE-BSD FRML MDRD: 60 ML/MIN/1.73SQ M
GLUCOSE P FAST SERPL-MCNC: 75 MG/DL (ref 65–99)
POTASSIUM SERPL-SCNC: 4 MMOL/L (ref 3.5–5.3)
SODIUM SERPL-SCNC: 136 MMOL/L (ref 135–147)

## 2022-08-18 PROCEDURE — 80048 BASIC METABOLIC PNL TOTAL CA: CPT

## 2022-08-18 PROCEDURE — 36415 COLL VENOUS BLD VENIPUNCTURE: CPT

## 2022-08-24 ENCOUNTER — OFFICE VISIT (OUTPATIENT)
Dept: FAMILY MEDICINE CLINIC | Facility: CLINIC | Age: 82
End: 2022-08-24
Payer: MEDICARE

## 2022-08-24 VITALS
RESPIRATION RATE: 16 BRPM | TEMPERATURE: 97.4 F | WEIGHT: 176.2 LBS | HEIGHT: 59 IN | SYSTOLIC BLOOD PRESSURE: 140 MMHG | DIASTOLIC BLOOD PRESSURE: 70 MMHG | HEART RATE: 90 BPM | BODY MASS INDEX: 35.52 KG/M2 | OXYGEN SATURATION: 97 %

## 2022-08-24 DIAGNOSIS — J45.20 MILD INTERMITTENT ASTHMA WITHOUT COMPLICATION: ICD-10-CM

## 2022-08-24 DIAGNOSIS — H00.014 HORDEOLUM EXTERNUM OF LEFT UPPER EYELID: Primary | ICD-10-CM

## 2022-08-24 PROCEDURE — 99213 OFFICE O/P EST LOW 20 MIN: CPT | Performed by: FAMILY MEDICINE

## 2022-08-24 RX ORDER — ALBUTEROL SULFATE 90 UG/1
2 AEROSOL, METERED RESPIRATORY (INHALATION) EVERY 6 HOURS PRN
Qty: 8.5 G | Refills: 1 | Status: SHIPPED | OUTPATIENT
Start: 2022-08-24

## 2022-08-24 RX ORDER — TOBRAMYCIN 3 MG/ML
1 SOLUTION/ DROPS OPHTHALMIC
Qty: 1.3 ML | Refills: 0 | Status: SHIPPED | OUTPATIENT
Start: 2022-08-24 | End: 2022-08-29

## 2022-08-24 RX ORDER — FUROSEMIDE 20 MG/1
20 TABLET ORAL DAILY
COMMUNITY
Start: 2022-08-04

## 2022-08-24 NOTE — PROGRESS NOTES
Assessment/Plan:  Chief Complaint   Patient presents with    Eye Problem     Left eye is itchy and red     Patient Instructions   Rec warm compress prn and also rec tobrex eye drops as directed for left upper eyelid stye  F-up with Ophthalmology as directed  Refilled asthma inhaler  No problem-specific Assessment & Plan notes found for this encounter  Diagnoses and all orders for this visit:    Hordeolum externum of left upper eyelid  -     tobramycin (TOBREX) 0 3 % SOLN; Administer 1 drop into the left eye every 4 (four) hours while awake for 5 days    Mild intermittent asthma without complication  -     albuterol (PROVENTIL HFA,VENTOLIN HFA) 90 mcg/act inhaler; Inhale 2 puffs every 6 (six) hours as needed for wheezing    Other orders  -     furosemide (LASIX) 20 mg tablet; Take 20 mg by mouth daily          Subjective:      Patient ID: David Mock is a 79 y o  female  Eye Problem (Left eye is itchy and red) Left eye upper eyelid stye  The following portions of the patient's history were reviewed and updated as appropriate: allergies, current medications, past family history, past medical history, past social history, past surgical history and problem list     Review of Systems   Constitutional: Negative  HENT: Negative  Eyes:        Left eye upper eyelid stye   Respiratory: Negative  Cardiovascular: Negative  Gastrointestinal: Negative  Endocrine: Negative  Genitourinary: Negative  Musculoskeletal: Negative  Skin: Negative  Allergic/Immunologic: Negative  Neurological: Negative  Hematological: Negative  Psychiatric/Behavioral: Negative  Objective:      /70   Pulse 90   Temp (!) 97 4 °F (36 3 °C) (Temporal)   Resp 16   Ht 4' 11" (1 499 m)   Wt 79 9 kg (176 lb 3 2 oz)   SpO2 97%   BMI 35 59 kg/m²          Physical Exam  Constitutional:       Appearance: She is well-developed  HENT:      Head: Normocephalic and atraumatic  Right Ear: External ear normal       Left Ear: External ear normal    Eyes:      Conjunctiva/sclera: Conjunctivae normal       Pupils: Pupils are equal, round, and reactive to light  Comments: Left upper eyelid stye   Cardiovascular:      Rate and Rhythm: Normal rate and regular rhythm  Heart sounds: Normal heart sounds  Pulmonary:      Effort: Pulmonary effort is normal       Breath sounds: Normal breath sounds  Musculoskeletal:         General: Normal range of motion  Cervical back: Normal range of motion and neck supple  Skin:     General: Skin is warm and dry  Neurological:      Mental Status: She is alert and oriented to person, place, and time  Deep Tendon Reflexes: Reflexes are normal and symmetric     Psychiatric:         Behavior: Behavior normal

## 2022-08-24 NOTE — PATIENT INSTRUCTIONS
Rec warm compress prn and also rec tobrex eye drops as directed for left upper eyelid stye  F-up with Ophthalmology as directed  Refilled asthma inhaler

## 2022-09-27 ENCOUNTER — APPOINTMENT (OUTPATIENT)
Dept: LAB | Facility: CLINIC | Age: 82
End: 2022-09-27
Payer: MEDICARE

## 2022-09-27 DIAGNOSIS — R10.11 RUQ ABDOMINAL PAIN: ICD-10-CM

## 2022-09-27 LAB
ALBUMIN SERPL BCP-MCNC: 3.7 G/DL (ref 3.5–5)
ALP SERPL-CCNC: 73 U/L (ref 46–116)
ALT SERPL W P-5'-P-CCNC: 36 U/L (ref 12–78)
ANION GAP SERPL CALCULATED.3IONS-SCNC: 5 MMOL/L (ref 4–13)
AST SERPL W P-5'-P-CCNC: 24 U/L (ref 5–45)
BILIRUB SERPL-MCNC: 0.5 MG/DL (ref 0.2–1)
BUN SERPL-MCNC: 12 MG/DL (ref 5–25)
CALCIUM SERPL-MCNC: 9.3 MG/DL (ref 8.3–10.1)
CHLORIDE SERPL-SCNC: 104 MMOL/L (ref 96–108)
CO2 SERPL-SCNC: 26 MMOL/L (ref 21–32)
CREAT SERPL-MCNC: 0.98 MG/DL (ref 0.6–1.3)
GFR SERPL CREATININE-BSD FRML MDRD: 53 ML/MIN/1.73SQ M
GLUCOSE P FAST SERPL-MCNC: 92 MG/DL (ref 65–99)
LIPASE SERPL-CCNC: 136 U/L (ref 73–393)
POTASSIUM SERPL-SCNC: 4.1 MMOL/L (ref 3.5–5.3)
PROT SERPL-MCNC: 7 G/DL (ref 6.4–8.4)
SODIUM SERPL-SCNC: 135 MMOL/L (ref 135–147)

## 2022-09-27 PROCEDURE — 36415 COLL VENOUS BLD VENIPUNCTURE: CPT

## 2022-09-27 PROCEDURE — 83690 ASSAY OF LIPASE: CPT

## 2022-09-27 PROCEDURE — 80053 COMPREHEN METABOLIC PANEL: CPT

## 2022-09-27 NOTE — TELEPHONE ENCOUNTER
What diagnosis should we use for Dexa scan since Screening for Osteoporosis is not deemed medically necessary ? Please advise   Thanks 115

## 2022-09-29 ENCOUNTER — HOSPITAL ENCOUNTER (OUTPATIENT)
Dept: ULTRASOUND IMAGING | Facility: MEDICAL CENTER | Age: 82
Discharge: HOME | End: 2022-09-29
Payer: MEDICARE

## 2022-09-29 DIAGNOSIS — R10.11 RIGHT UPPER QUADRANT PAIN: ICD-10-CM

## 2022-09-29 PROCEDURE — 76705 ECHO EXAM OF ABDOMEN: CPT

## 2022-11-01 ENCOUNTER — APPOINTMENT (OUTPATIENT)
Dept: LAB | Facility: CLINIC | Age: 82
End: 2022-11-01

## 2022-11-01 DIAGNOSIS — N18.31 STAGE 3A CHRONIC KIDNEY DISEASE (HCC): ICD-10-CM

## 2022-11-01 LAB
ALBUMIN SERPL BCP-MCNC: 3.5 G/DL (ref 3.5–5)
ANION GAP SERPL CALCULATED.3IONS-SCNC: 5 MMOL/L (ref 4–13)
BUN SERPL-MCNC: 15 MG/DL (ref 5–25)
CALCIUM SERPL-MCNC: 9.6 MG/DL (ref 8.3–10.1)
CHLORIDE SERPL-SCNC: 103 MMOL/L (ref 96–108)
CO2 SERPL-SCNC: 28 MMOL/L (ref 21–32)
CREAT SERPL-MCNC: 0.95 MG/DL (ref 0.6–1.3)
GFR SERPL CREATININE-BSD FRML MDRD: 55 ML/MIN/1.73SQ M
GLUCOSE P FAST SERPL-MCNC: 86 MG/DL (ref 65–99)
PHOSPHATE SERPL-MCNC: 3.2 MG/DL (ref 2.3–4.1)
POTASSIUM SERPL-SCNC: 3.9 MMOL/L (ref 3.5–5.3)
SODIUM SERPL-SCNC: 136 MMOL/L (ref 135–147)

## 2022-11-04 ENCOUNTER — RA CDI HCC (OUTPATIENT)
Dept: OTHER | Facility: HOSPITAL | Age: 82
End: 2022-11-04

## 2022-11-04 NOTE — PROGRESS NOTES
[facility] 75  coding opportunities          Chart Reviewed number of suggestions sent to Provider: 1  [phone]     Patients Insurance     Medicare Insurance: [employer]

## 2022-11-07 ENCOUNTER — HOSPITAL ENCOUNTER (OUTPATIENT)
Dept: MAMMOGRAPHY | Facility: MEDICAL CENTER | Age: 82
Discharge: HOME | End: 2022-11-07

## 2022-11-07 VITALS — HEIGHT: 59 IN | BODY MASS INDEX: 34.07 KG/M2 | WEIGHT: 169 LBS

## 2022-11-07 DIAGNOSIS — Z13.9 SCREENING DUE: ICD-10-CM

## 2022-11-11 ENCOUNTER — OFFICE VISIT (OUTPATIENT)
Dept: FAMILY MEDICINE CLINIC | Facility: CLINIC | Age: 82
End: 2022-11-11

## 2022-11-11 VITALS
OXYGEN SATURATION: 97 % | WEIGHT: 164.4 LBS | TEMPERATURE: 97.4 F | HEART RATE: 60 BPM | HEIGHT: 59 IN | BODY MASS INDEX: 33.14 KG/M2 | SYSTOLIC BLOOD PRESSURE: 122 MMHG | DIASTOLIC BLOOD PRESSURE: 62 MMHG

## 2022-11-11 DIAGNOSIS — N18.32 STAGE 3B CHRONIC KIDNEY DISEASE (HCC): ICD-10-CM

## 2022-11-11 DIAGNOSIS — M43.10 SPONDYLOLISTHESIS, ACQUIRED: ICD-10-CM

## 2022-11-11 DIAGNOSIS — E03.9 ADULT HYPOTHYROIDISM: Primary | ICD-10-CM

## 2022-11-11 NOTE — PROGRESS NOTES
Name: Lolita Topete      : 1940      MRN: [id number]  Encounter Provider: Bonifacio Clinton DO  Encounter Date: 2022   Encounter department: [facility] PRIMARY CARE    Assessment & Plan     1  Adult hypothyroidism    2  Stage 3b chronic kidney disease (HCC)    3  Spondylolisthesis, acquired    The current medical regimen is effective;  continue present plan and medications  Subjective     24-year-old female here for six-month recheck  Chief Complaint   Patient presents with   • Follow-up     Here for 6 month follow up visit, no new concerns  Was seen by her kidney doctor this week and her GI doctor  Review of Systems   Constitutional: Positive for unexpected weight change (minus 12# trying)  Component      Latest Ref Rng & Units 2022   Albumin      3 5 - 5 0 g/dL 3 5   Calcium      8 3 - 10 1 mg/dL 9 6   Phosphorus      2 3 - 4 1 mg/dL 3 2   BUN      5 - 25 mg/dL 15   Creatinine      0 60 - 1 30 mg/dL 0 95   Sodium      135 - 147 mmol/L 136   Potassium      3 5 - 5 3 mmol/L 3 9   Chloride      96 - 108 mmol/L 103   CO2      21 - 32 mmol/L 28   Anion Gap      4 - 13 mmol/L 5   eGFR      ml/min/1 73sq m 55   GLUCOSE FASTING      65 - 99 mg/dL 86   EXT Creatinine Urine      mg/dL 117 0   Protein Urine Random      mg/dL 13   Prot/Creat Ratio, Ur      0 00 - 0 10 0 11 (H)     Had HIDA scan yesterday--- hyperkinetic  Right sided thoracic T 8-9There is a right paracentral and subarticular disc protrusion at T8-T9 causing   mild spinal stenosis and mild narrowing of the proximal right neural foramen  This is unchanged   MRI from 2016 [facility]    Past Medical History:   Diagnosis Date   • Arthritis    • Colon polyps    • Disease of thyroid gland    • Hyperlipidemia    • Migraine    • Pure hypercholesterolemia    • Thyroid disease      Past Surgical History:   Procedure Laterality Date   • BREAST BIOPSY Right     benign many yrs ago   • DILATION AND CURETTAGE, DIAGNOSTIC / THERAPEUTIC     • MAMMO STEREOTACTIC BREAST BIOPSY RIGHT (ALL INC) Right     yrs ago-benign   • TONSILLECTOMY     • TUBAL LIGATION       Family History   Problem Relation Age of Onset   • Pancreatic cancer Mother 79        susceptibility    • Kidney disease Father    • Pancreatic cancer Maternal Aunt         over 48   • Pancreatic cancer Paternal Aunt         over 48   • No Known Problems Maternal Grandmother    • No Known Problems Paternal Grandmother    • No Known Problems Maternal Aunt    • No Known Problems Maternal Aunt    • No Known Problems Maternal Aunt    • No Known Problems Paternal Aunt      Social History     Socioeconomic History   • Marital status:      Spouse name: None   • Number of children: None   • Years of education: None   • Highest education level: None   Occupational History   • None   Tobacco Use   • Smoking status: Former Smoker     Types: Cigarettes     Quit date:      Years since quittin 8   • Smokeless tobacco: Never Used   Vaping Use   • Vaping Use: Never used   Substance and Sexual Activity   • Alcohol use:  Yes     Alcohol/week: 1 0 standard drink     Types: 1 Glasses of wine per week     Comment: social    • Drug use: No   • Sexual activity: None   Other Topics Concern   • None   Social History Narrative    Patient education -Asthma resolved     Caffeine use      Social Determinants of Health     Financial Resource Strain: Not on file   Food Insecurity: Not on file   Transportation Needs: Not on file   Physical Activity: Not on file   Stress: Not on file   Social Connections: Not on file   Intimate Partner Violence: Not on file   Housing Stability: Not on file     Current Outpatient Medications on File Prior to Visit   Medication Sig   • acetaminophen (TYLENOL) 650 mg CR tablet Take 650 mg by mouth every 8 (eight) hours as needed for mild pain   • albuterol (PROVENTIL HFA,VENTOLIN HFA) 90 mcg/act inhaler Inhale 2 puffs every 6 (six) hours as needed for wheezing   • Cholecalciferol (VITAMIN D) 2000 units CAPS Take 1 tablet by mouth daily   • co-enzyme Q-10 30 MG capsule Take 30 mg by mouth daily   • cyanocobalamin (VITAMIN B-12) 1000 MCG tablet Take 1 tablet by mouth in the morning   • furosemide (LASIX) 20 mg tablet Take 20 mg by mouth daily   • levothyroxine 50 mcg tablet TAKE 1 TABLET (50 MCG TOTAL) BY MOUTH DAILY   • Lumigan 0 01 % ophthalmic drops USE 1 DROP IN EACH EYE EVERY NIGHT AT BEDTIME   • Multiple Vitamins-Minerals (EMERGEN-C IMMUNE PO) Vitamin C   daily   • pravastatin (PRAVACHOL) 20 mg tablet Take 1 tablet (20 mg total) by mouth every other day     Allergies   Allergen Reactions   • Molds & Smuts Shortness Of Breath   • Penicillins Hives, Rash and Edema     Category: Allergy; Other reaction(s): Other (see comments)  Category: Allergy;      Immunization History   Administered Date(s) Administered   • COVID-19 MODERNA VACC 0 5 ML IM 01/25/2021, 02/25/2021, 10/29/2021, 04/20/2022   • INFLUENZA 11/14/2012, 11/06/2013, 09/23/2020, 10/06/2021, 10/21/2022   • Influenza Split High Dose Preservative Free IM 11/01/2012, 09/18/2015, 09/15/2016, 10/30/2016, 09/22/2017   • Influenza, high dose seasonal 0 7 mL 10/09/2018   • Pneumococcal Conjugate 13-Valent 01/19/2018   • Pneumococcal Conjugate PCV 7 06/19/2015   • Pneumococcal Polysaccharide PPV23 07/13/2008, 09/23/2020   • Rabies, Intramuscular 05/27/2018, 05/30/2018, 06/03/2018, 06/10/2018   • TD (adult) Preservative Free 01/13/2012   • Zoster 01/13/2010   • Zoster Vaccine Recombinant 03/06/2020, 07/22/2020   • influenza, trivalent, adjuvanted 10/11/2019       Objective     /62   Pulse 60   Temp (!) 97 4 °F (36 3 °C) (Temporal)   Ht 4' 11" (1 499 m)   Wt 74 6 kg (164 lb 6 4 oz)   SpO2 97%   BMI 33 20 kg/m²     Physical Exam  Constitutional:       General: She is not in acute distress  Appearance: Normal appearance  She is well-developed        Comments: 65-year-old female who appears younger than her stated age   HENT:      Right Ear: Tympanic membrane normal       Left Ear: Tympanic membrane normal    Eyes:      Pupils: Pupils are equal, round, and reactive to light  Cardiovascular:      Rate and Rhythm: Normal rate and regular rhythm  Heart sounds: No murmur heard  Pulmonary:      Effort: Pulmonary effort is normal       Breath sounds: Normal breath sounds  Abdominal:      General: Bowel sounds are normal       Palpations: Abdomen is soft  There is no mass  Tenderness: There is no abdominal tenderness  Skin:     General: Skin is warm and dry  Neurological:      Mental Status: She is alert and oriented to person, place, and time  Deep Tendon Reflexes: Reflexes are normal and symmetric  Psychiatric:         Mood and Affect: Mood normal          Behavior: Behavior normal          Thought Content:  Thought content normal          Judgment: Judgment normal        Arnold Lagunas DO

## 2022-12-01 DIAGNOSIS — E03.9 ADULT HYPOTHYROIDISM: ICD-10-CM

## 2022-12-01 RX ORDER — LEVOTHYROXINE SODIUM 0.05 MG/1
50 TABLET ORAL DAILY
Qty: 90 TABLET | Refills: 3 | Status: SHIPPED | OUTPATIENT
Start: 2022-12-01

## 2023-05-01 ENCOUNTER — APPOINTMENT (OUTPATIENT)
Dept: LAB | Facility: CLINIC | Age: 83
End: 2023-05-01

## 2023-05-01 ENCOUNTER — TELEPHONE (OUTPATIENT)
Dept: FAMILY MEDICINE CLINIC | Facility: CLINIC | Age: 83
End: 2023-05-01

## 2023-05-01 DIAGNOSIS — N18.31 STAGE 3A CHRONIC KIDNEY DISEASE (HCC): ICD-10-CM

## 2023-05-01 LAB
ALBUMIN SERPL BCP-MCNC: 3.6 G/DL (ref 3.5–5)
ANION GAP SERPL CALCULATED.3IONS-SCNC: -1 MMOL/L (ref 4–13)
BUN SERPL-MCNC: 13 MG/DL (ref 5–25)
CALCIUM SERPL-MCNC: 9.8 MG/DL (ref 8.3–10.1)
CHLORIDE SERPL-SCNC: 106 MMOL/L (ref 96–108)
CO2 SERPL-SCNC: 31 MMOL/L (ref 21–32)
CREAT SERPL-MCNC: 1.03 MG/DL (ref 0.6–1.3)
ERYTHROCYTE [DISTWIDTH] IN BLOOD BY AUTOMATED COUNT: 14.3 % (ref 11.6–15.1)
GFR SERPL CREATININE-BSD FRML MDRD: 50 ML/MIN/1.73SQ M
GLUCOSE P FAST SERPL-MCNC: 93 MG/DL (ref 65–99)
HCT VFR BLD AUTO: 42.2 % (ref 34.8–46.1)
HGB BLD-MCNC: 13.1 G/DL (ref 11.5–15.4)
MAGNESIUM SERPL-MCNC: 2.7 MG/DL (ref 1.6–2.6)
MCH RBC QN AUTO: 29.1 PG (ref 26.8–34.3)
MCHC RBC AUTO-ENTMCNC: 31 G/DL (ref 31.4–37.4)
MCV RBC AUTO: 94 FL (ref 82–98)
PHOSPHATE SERPL-MCNC: 3.6 MG/DL (ref 2.3–4.1)
PLATELET # BLD AUTO: 242 THOUSANDS/UL (ref 149–390)
PMV BLD AUTO: 9.8 FL (ref 8.9–12.7)
POTASSIUM SERPL-SCNC: 4.2 MMOL/L (ref 3.5–5.3)
RBC # BLD AUTO: 4.5 MILLION/UL (ref 3.81–5.12)
SODIUM SERPL-SCNC: 136 MMOL/L (ref 135–147)
WBC # BLD AUTO: 4.6 THOUSAND/UL (ref 4.31–10.16)

## 2023-05-03 DIAGNOSIS — E78.01 FAMILIAL HYPERCHOLESTEROLEMIA: Primary | ICD-10-CM

## 2023-05-03 DIAGNOSIS — E03.9 ADULT HYPOTHYROIDISM: ICD-10-CM

## 2023-05-03 DIAGNOSIS — R73.03 PREDIABETES: ICD-10-CM

## 2023-05-22 DIAGNOSIS — E78.49 OTHER HYPERLIPIDEMIA: ICD-10-CM

## 2023-05-22 RX ORDER — PRAVASTATIN SODIUM 20 MG
20 TABLET ORAL EVERY OTHER DAY
Qty: 45 TABLET | Refills: 2 | Status: SHIPPED | OUTPATIENT
Start: 2023-05-22

## 2023-06-20 ENCOUNTER — APPOINTMENT (OUTPATIENT)
Dept: RADIOLOGY | Age: 83
End: 2023-06-20
Payer: MEDICARE

## 2023-06-20 ENCOUNTER — OFFICE VISIT (OUTPATIENT)
Dept: FAMILY MEDICINE CLINIC | Facility: CLINIC | Age: 83
End: 2023-06-20
Payer: MEDICARE

## 2023-06-20 VITALS
WEIGHT: 167 LBS | OXYGEN SATURATION: 98 % | BODY MASS INDEX: 33.67 KG/M2 | HEART RATE: 66 BPM | HEIGHT: 59 IN | SYSTOLIC BLOOD PRESSURE: 146 MMHG | TEMPERATURE: 97.9 F | DIASTOLIC BLOOD PRESSURE: 72 MMHG

## 2023-06-20 DIAGNOSIS — D32.9 MENINGIOMA (HCC): ICD-10-CM
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
## 2023-06-20 DIAGNOSIS — N18.32 STAGE 3B CHRONIC KIDNEY DISEASE (HCC): ICD-10-CM

## 2023-06-20 DIAGNOSIS — M35.9 AUTOIMMUNE DISEASE (HCC): ICD-10-CM

## 2023-06-20 DIAGNOSIS — M53.3 TAIL BONE PAIN: Primary | ICD-10-CM

## 2023-06-20 DIAGNOSIS — M53.3 TAIL BONE PAIN: ICD-10-CM

## 2023-06-20 PROCEDURE — 99213 OFFICE O/P EST LOW 20 MIN: CPT | Performed by: NURSE PRACTITIONER

## 2023-06-20 PROCEDURE — 72220 X-RAY EXAM SACRUM TAILBONE: CPT

## 2023-06-20 NOTE — PATIENT INSTRUCTIONS
Complete x ray  Use ice  Please call the office if you are experiencing any worsening of symptoms or no symptom improvement

## 2023-06-20 NOTE — PROGRESS NOTES
Name: Herber Valdez      : 1940      MRN: [id number]  Encounter Provider: CATARINA Lemon  Encounter Date: 2023   Encounter department: [address]     1  Tail bone pain  -     XR sacrum and coccyx; Future; Expected date: 2023    2  Fall, initial encounter  -     XR sacrum and coccyx; Future; Expected date: 2023    3  Autoimmune disease ([address] )    4  Meningioma (HCC)    5  Stage 3b chronic kidney disease (HCC)    Complete x ray  Use ice  We will follow up with results of x ray  Patient continues to follow with nephrology and orthopedics for management of other conditions  Patient was followed with neurosurgery before in the past regarding meningioma and had follow-up imaging done that showed no change  They did not recommend surgical intervention at that time per note  Subjective        Patient presents to the office today for evaluation of tailbone pain  Yesterday while she was lifting cat litter she fell and landed on her tailbone and ended up on her back  Patient has history of chronic back pain and follows with pain management for injections which she did have last week  She states her back did not hurt and feels fine thankfully  It is just her tailbone area that is sore  Sitting does not bother her too much its more so with moving or pressure on the area  The fall happened yesterday on the   She was not able to look at the area to see if there were any wounds or bruising  No hip pain or pelvic pain  She is able to bear weight and walk well  Using single-point cane for ambulation  Review of Systems   Musculoskeletal: Positive for arthralgias         Current Outpatient Medications on File Prior to Visit   Medication Sig   • acetaminophen (TYLENOL) 650 mg CR tablet Take 650 mg by mouth every 8 (eight) hours as needed for mild pain   • albuterol (PROVENTIL HFA,VENTOLIN HFA) 90 mcg/act inhaler Inhale 2 "puffs every 6 (six) hours as needed for wheezing   • Cholecalciferol (VITAMIN D) 2000 units CAPS Take 1 tablet by mouth daily   • co-enzyme Q-10 30 MG capsule Take 30 mg by mouth daily   • cyanocobalamin (VITAMIN B-12) 1000 MCG tablet Take 1 tablet by mouth in the morning   • furosemide (LASIX) 20 mg tablet Take 20 mg by mouth daily   • levothyroxine 50 mcg tablet TAKE 1 TABLET (50 MCG TOTAL) BY MOUTH DAILY   • Lumigan 0 01 % ophthalmic drops USE 1 DROP IN EACH EYE EVERY NIGHT AT BEDTIME   • Multiple Vitamins-Minerals (EMERGEN-C IMMUNE PO) Vitamin C   daily   • pravastatin (PRAVACHOL) 20 mg tablet TAKE 1 TABLET (20 MG TOTAL) BY MOUTH EVERY OTHER DAY       Objective     /72 (BP Location: Left arm, Patient Position: Sitting, Cuff Size: Large)   Pulse 66   Temp 97 9 °F (36 6 °C) (Temporal)   Ht 4' 11\" (1 499 m)   Wt 75 8 kg (167 lb)   SpO2 98%   BMI 33 73 kg/m²     Physical Exam  Vitals and nursing note reviewed  Constitutional:       General: She is not in acute distress  Appearance: She is well-developed  She is not diaphoretic  HENT:      Head: Normocephalic and atraumatic  Right Ear: External ear normal       Left Ear: External ear normal    Eyes:      General: Lids are normal          Right eye: No discharge  Left eye: No discharge  Conjunctiva/sclera: Conjunctivae normal    Pulmonary:      Effort: Pulmonary effort is normal  No respiratory distress  Musculoskeletal:         General: No deformity  Cervical back: Neck supple  Lumbar back: Bony tenderness (around tail bone) present  Back:    Skin:     General: Skin is warm and dry  Neurological:      Mental Status: She is alert and oriented to person, place, and time  Psychiatric:         Speech: Speech normal          Behavior: Behavior normal          Thought Content:  Thought content normal          Judgment: Judgment normal        CATARINA Gipson    "

## 2023-06-28 ENCOUNTER — APPOINTMENT (OUTPATIENT)
Dept: LAB | Age: 83
End: 2023-06-28
Payer: MEDICARE

## 2023-06-28 DIAGNOSIS — R73.03 PREDIABETES: ICD-10-CM

## 2023-06-28 DIAGNOSIS — E03.9 ADULT HYPOTHYROIDISM: ICD-10-CM

## 2023-06-28 DIAGNOSIS — E78.01 FAMILIAL HYPERCHOLESTEROLEMIA: ICD-10-CM

## 2023-06-28 LAB
CHOLEST SERPL-MCNC: 208 MG/DL
EST. AVERAGE GLUCOSE BLD GHB EST-MCNC: 111 MG/DL
HBA1C MFR BLD: 5.5 %
HDLC SERPL-MCNC: 79 MG/DL
LDLC SERPL CALC-MCNC: 113 MG/DL (ref 0–100)
TRIGL SERPL-MCNC: 79 MG/DL
TSH SERPL DL<=0.05 MIU/L-ACNC: 1.94 UIU/ML (ref 0.45–4.5)

## 2023-06-28 PROCEDURE — 84443 ASSAY THYROID STIM HORMONE: CPT

## 2023-06-28 PROCEDURE — 83036 HEMOGLOBIN GLYCOSYLATED A1C: CPT

## 2023-06-28 PROCEDURE — 80061 LIPID PANEL: CPT

## 2023-06-28 PROCEDURE — 36415 COLL VENOUS BLD VENIPUNCTURE: CPT

## 2023-06-29 ENCOUNTER — RA CDI HCC (OUTPATIENT)
Dept: OTHER | Facility: HOSPITAL | Age: 83
End: 2023-06-29

## 2023-06-29 NOTE — PROGRESS NOTES
[address]  coding opportunities       Chart reviewed, no opportunity found: CHART REVIEWED, NO OPPORTUNITY FOUND        Patients Insurance     Medicare Insurance: Medicare

## 2023-07-07 ENCOUNTER — OFFICE VISIT (OUTPATIENT)
Dept: FAMILY MEDICINE CLINIC | Facility: CLINIC | Age: 83
End: 2023-07-07
Payer: MEDICARE

## 2023-07-07 VITALS
HEIGHT: 59 IN | SYSTOLIC BLOOD PRESSURE: 130 MMHG | HEART RATE: 75 BPM | DIASTOLIC BLOOD PRESSURE: 78 MMHG | WEIGHT: 170.2 LBS | TEMPERATURE: 97.3 F | BODY MASS INDEX: 34.31 KG/M2 | OXYGEN SATURATION: 96 %

## 2023-07-07 DIAGNOSIS — E78.01 FAMILIAL HYPERCHOLESTEROLEMIA: ICD-10-CM

## 2023-07-07 DIAGNOSIS — E03.9 ADULT HYPOTHYROIDISM: ICD-10-CM

## 2023-07-07 DIAGNOSIS — Z00.00 MEDICARE ANNUAL WELLNESS VISIT, SUBSEQUENT: Primary | ICD-10-CM

## 2023-07-07 DIAGNOSIS — M54.16 LUMBAR RADICULOPATHY: ICD-10-CM

## 2023-07-07 DIAGNOSIS — M48.062 SPINAL STENOSIS OF LUMBAR REGION WITH NEUROGENIC CLAUDICATION: ICD-10-CM

## 2023-07-07 DIAGNOSIS — M43.10 SPONDYLOLISTHESIS, ACQUIRED: ICD-10-CM

## 2023-07-07 PROBLEM — E07.9 THYROID CONDITION: Status: ACTIVE | Noted: 2023-07-07

## 2023-07-07 PROCEDURE — G0439 PPPS, SUBSEQ VISIT: HCPCS | Performed by: FAMILY MEDICINE

## 2023-07-07 NOTE — PROGRESS NOTES
Assessment and Plan:   Tara Kim was seen today for medicare wellness visit. Diagnoses and all orders for this visit:    Medicare annual wellness visit, subsequent    Adult hypothyroidism    Familial hypercholesterolemia    Lumbar radiculopathy  -     Ambulatory Referral to Pain Management; Future    Spondylolisthesis, acquired  -     Ambulatory Referral to Pain Management; Future    Spinal stenosis of lumbar region with neurogenic claudication  -     Ambulatory Referral to Pain Management; Future    The current medical regimen is effective;  continue present plan and medications. Problem List Items Addressed This Visit        Endocrine    Adult hypothyroidism       Nervous and Auditory    Lumbar radiculopathy    Relevant Orders    Ambulatory Referral to Pain Management       Musculoskeletal and Integument    Spondylolisthesis, acquired    Relevant Orders    Ambulatory Referral to Pain Management       Other    Hyperlipemia    Spinal stenosis of lumbar region    Relevant Orders    Ambulatory Referral to Pain Management   Other Visit Diagnoses     Medicare annual wellness visit, subsequent    -  Primary        BMI Counseling: Body mass index is 34.38 kg/m². The BMI is above normal. Nutrition recommendations include decreasing portion sizes, encouraging healthy choices of fruits and vegetables, decreasing fast food intake, consuming healthier snacks, limiting drinks that contain sugar, moderation in carbohydrate intake, increasing intake of lean protein, reducing intake of saturated and trans fat and reducing intake of cholesterol. Exercise recommendations include exercising 3-5 times per week. Patient referred to PCP. Rationale for BMI follow-up plan is due to patient being overweight or obese. Depression Screening and Follow-up Plan: Patient was screened for depression during today's encounter. They screened negative with a PHQ-2 score of 0.     Falls Plan of Care: balance, strength, and gait training instructions were provided. Home safety education provided. Patient was given literature on vestibular rehab exercises to try at home. Preventive health issues were discussed with patient, and age appropriate screening tests were ordered as noted in patient's After Visit Summary. Personalized health advice and appropriate referrals for health education or preventive services given if needed, as noted in patient's After Visit Summary. History of Present Illness:     Patient presents for a Medicare Wellness Visit    42-year-old female here for Medicare wellness. She is doing relatively well medically. She has some ongoing arthritis/orthopedic issues. Recent lab work reviewed from June 28. Wants second opinion from pain management-has been following with [facility] Dr. Dobbins but generally only sees the PA or nurse practitioner. Recent fall on coccyx-seen in the office by Roney Boas, x-rays negative. Slowly improving. She states that her balance feels off from her back. She at times is unsure of her self. There is no vertigo or lightheadedness. Notes are in media from [address] [place] [address]. Most recent MRI of the lumbar spine is in care everywhere dated 9/2/2022. At that time patient had stable multifactorial severe spinal stenosis at L4-L5 and moderate spinal stenosis at L3-L4 and mild to moderate spinal stenosis at L2-L3. Also had neural foraminal narrowing L4-L5 with impingement on the bilateral exiting L5 for nerve root which was similar to prior study. Patient Care Team:  Joan Jimenez DO as PCP - General  MD Kalin Cervantes DO Ival Heritage, DO     Review of Systems:     Review of Systems   Constitutional: Negative for unexpected weight change (Cannot lose weight). Musculoskeletal: Positive for arthralgias and myalgias. Neurological:        Balance issues on/off.  Recent fall     Component      Latest Ref Rng & Units 6/28/2023   Cholesterol      See Comment mg/dL 208 (H)   Triglycerides See Comment mg/dL 79   HDL      >=50 mg/dL 79   LDL Calculated      0 - 100 mg/dL 113 (H)   Hemoglobin A1C      Normal 3.8-5.6%; PreDiabetic 5.7-6.4%; Diabetic >=6.5%; Glycemic control for adults with diabetes <7.0% % 5.5   eAG, EST AVG Glucose      mg/dl 111   TSH 3RD GENERATON      0.450 - 4.500 uIU/mL 1.941   Patient also follows with nephrology has stable CKD. Patient to remain on her same dose of levothyroxine as well as statin.     Component      Latest Ref Rng & Units 11/1/2022 5/1/2023   Albumin      3.5 - 5.0 g/dL 3.5 3.6   Calcium      8.3 - 10.1 mg/dL 9.6 9.8   Phosphorus      2.3 - 4.1 mg/dL 3.2 3.6   BUN      5 - 25 mg/dL 15 13   Creatinine      0.60 - 1.30 mg/dL 0.95 1.03   Sodium      135 - 147 mmol/L 136 136   Potassium      3.5 - 5.3 mmol/L 3.9 4.2   Chloride      96 - 108 mmol/L 103 106   CO2      21 - 32 mmol/L 28 31   Anion Gap      4 - 13 mmol/L 5 -1 (L)   eGFR      ml/min/1.73sq m 55 50   GLUCOSE FASTING      65 - 99 mg/dL 86 93      Problem List:     Patient Active Problem List   Diagnosis   • Adult hypothyroidism   • Glaucoma   • Heel spur, left   • Hyperlipemia   • Vitamin D deficiency   • Primary osteoarthritis of both knees   • Peripheral neuropathy   • Meningioma (HCC)   • Lumbar radiculopathy   • Spinal stenosis of lumbar region   • Right upper quadrant abdominal pain   • Fatty liver   • Cavernous hemangioma of liver   • Asthma   • Autoimmune disease (HCC)   • Disorder of lipid metabolism   • Disorder of uterus   • Polyp of corpus uteri   • Spondylolisthesis, acquired   • Localized, primary osteoarthritis   • Lumbosacral radiculitis   • Pseudoclaudication syndrome   • Rupture of right long head biceps tendon   • Genetic testing   • Family history of pancreatic cancer   • Prediabetes   • Axonal neuropathy   • Postmenopausal bleeding   • Stage 3b chronic kidney disease (HCC)   • Thyroid condition      Past Medical and Surgical History:     Past Medical History:   Diagnosis Date   • Arthritis    • Colon polyps    • Disease of thyroid gland    • Hyperlipidemia    • Migraine    • Pure hypercholesterolemia    • Thyroid disease      Past Surgical History:   Procedure Laterality Date   • BREAST BIOPSY Right     benign many yrs ago   • DILATION AND CURETTAGE, DIAGNOSTIC / THERAPEUTIC     • MAMMO STEREOTACTIC BREAST BIOPSY RIGHT (ALL INC) Right     yrs ago-benign   • TONSILLECTOMY     • TUBAL LIGATION        Family History:     Family History   Problem Relation Age of Onset   • Pancreatic cancer Mother 79        susceptibility    • Kidney disease Father    • Pancreatic cancer Maternal Aunt         over 48   • Pancreatic cancer Paternal Aunt         over 48   • No Known Problems Maternal Grandmother    • No Known Problems Paternal Grandmother    • No Known Problems Maternal Aunt    • No Known Problems Maternal Aunt    • No Known Problems Maternal Aunt    • No Known Problems Paternal Aunt       Social History:     Social History     Socioeconomic History   • Marital status:      Spouse name: None   • Number of children: None   • Years of education: None   • Highest education level: None   Occupational History   • None   Tobacco Use   • Smoking status: Former     Types: Cigarettes     Quit date:      Years since quittin.5   • Smokeless tobacco: Never   Vaping Use   • Vaping Use: Never used   Substance and Sexual Activity   • Alcohol use:  Yes     Alcohol/week: 1.0 standard drink of alcohol     Types: 1 Glasses of wine per week     Comment: social    • Drug use: No   • Sexual activity: None   Other Topics Concern   • None   Social History Narrative    Patient education -Asthma resolved     Caffeine use      Social Determinants of Health     Financial Resource Strain: Low Risk  (2023)    Overall Financial Resource Strain (CARDIA)    • Difficulty of Paying Living Expenses: Not hard at all   Food Insecurity: Not on file   Transportation Needs: No Transportation Needs (2023)    Contreras Bishop - Transportation    • Lack of Transportation (Medical): No    • Lack of Transportation (Non-Medical): No   Physical Activity: Not on file   Stress: Not on file   Social Connections: Not on file   Intimate Partner Violence: Not on file   Housing Stability: Not on file      Medications and Allergies:     Current Outpatient Medications   Medication Sig Dispense Refill   • acetaminophen (TYLENOL) 650 mg CR tablet Take 650 mg by mouth every 8 (eight) hours as needed for mild pain     • albuterol (PROVENTIL HFA,VENTOLIN HFA) 90 mcg/act inhaler Inhale 2 puffs every 6 (six) hours as needed for wheezing 8.5 g 1   • Cholecalciferol (VITAMIN D) 2000 units CAPS Take 1 tablet by mouth daily     • co-enzyme Q-10 30 MG capsule Take 30 mg by mouth daily     • cyanocobalamin (VITAMIN B-12) 1000 MCG tablet Take 1 tablet by mouth in the morning     • furosemide (LASIX) 20 mg tablet Take 20 mg by mouth daily     • levothyroxine 50 mcg tablet TAKE 1 TABLET (50 MCG TOTAL) BY MOUTH DAILY 90 tablet 3   • Lumigan 0.01 % ophthalmic drops USE 1 DROP IN EACH EYE EVERY NIGHT AT BEDTIME 7.5 mL 2   • Multiple Vitamins-Minerals (EMERGEN-C IMMUNE PO) Vitamin C   daily     • pravastatin (PRAVACHOL) 20 mg tablet TAKE 1 TABLET (20 MG TOTAL) BY MOUTH EVERY OTHER DAY 45 tablet 2     No current facility-administered medications for this visit. Allergies   Allergen Reactions   • Molds & Smuts Shortness Of Breath   • Penicillins Hives, Rash and Edema     Category: Allergy; Other reaction(s): Other (see comments)  Category:  Allergy;       Immunizations:     Immunization History   Administered Date(s) Administered   • COVID-19 MODERNA VACC 0.5 ML IM 01/25/2021, 02/25/2021, 10/29/2021, 04/20/2022   • INFLUENZA 11/14/2012, 11/06/2013, 09/23/2020, 10/06/2021, 10/21/2022   • Influenza Split High Dose Preservative Free IM 11/01/2012, 09/18/2015, 09/15/2016, 10/30/2016, 09/22/2017   • Influenza, high dose seasonal 0.7 mL 10/09/2018   • Pneumococcal Conjugate 13-Valent 01/19/2018   • Pneumococcal Conjugate PCV 7 06/19/2015   • Pneumococcal Polysaccharide PPV23 07/13/2008, 09/23/2020   • Rabies, Intramuscular 05/27/2018, 05/30/2018, 06/03/2018, 06/10/2018   • TD (adult) Preservative Free 01/13/2012   • Zoster 01/13/2010   • Zoster Vaccine Recombinant 03/06/2020, 07/22/2020   • influenza, trivalent, adjuvanted 10/11/2019      Health Maintenance: There are no preventive care reminders to display for this patient. Topic Date Due   • COVID-19 Vaccine (5 - Moderna series) 06/15/2022   • Influenza Vaccine (1) 09/01/2023      Medicare Screening Tests and Risk Assessments:         Health Risk Assessment:   Patient rates overall health as good. Patient feels that their physical health rating is slightly worse. Patient is very satisfied with their life. Eyesight was rated as slightly worse. Hearing was rated as same. Patient feels that their emotional and mental health rating is same. Patients states they are never, rarely angry. Patient states they are often unusually tired/fatigued. Pain experienced in the last 7 days has been a lot. Patient's pain rating has been 7/10. Patient states that she has experienced weight loss or gain in last 6 months. have sweet tooth trying to control    Depression Screening:   PHQ-2 Score: 0      Fall Risk Screening: In the past year, patient has experienced: history of falling in past year      Urinary Incontinence Screening:   Patient has leaked urine accidently in the last six months. has been an issue for years    Home Safety:  Patient has trouble with stairs inside or outside of their home. Patient has working smoke alarms and has working carbon monoxide detector. Home safety hazards include: none. Nutrition:   Current diet is Regular. Medications:   Patient is not currently taking any over-the-counter supplements. Patient is able to manage medications.      Activities of Daily Living (ADLs)/Instrumental Activities of Daily Living (IADLs):   Walk and transfer into and out of bed and chair?: Yes  Dress and groom yourself?: Yes    Bathe or shower yourself?: Yes    Feed yourself? Yes  Do your laundry/housekeeping?: Yes  Manage your money, pay your bills and track your expenses?: Yes  Make your own meals?: Yes    Do your own shopping?: Yes    Durable Medical Equipment Suppliers  none    Previous Hospitalizations:   Any hospitalizations or ED visits within the last 12 months?: No      Advance Care Planning:   Living will: Yes    Durable POA for healthcare: Yes    Advanced directive: Yes    End of Life Decisions reviewed with patient: Yes    Provider agrees with end of life decisions: Yes      Cognitive Screening:   Provider or family/friend/caregiver concerned regarding cognition?: No    PREVENTIVE SCREENINGS      Cardiovascular Screening:    General: History Lipid Disorder and Screening Current      Diabetes Screening:     General: Screening Current      Colorectal Cancer Screening:     General: Screening Not Indicated      Breast Cancer Screening:     General: Screening Current      Cervical Cancer Screening:    General: Screening Not Indicated      Osteoporosis Screening:    General: Screening Current      Lung Cancer Screening:     General: Screening Not Indicated      Hepatitis C Screening:    General: Screening Not Indicated    Hep C Screening Accepted: No     Screening, Brief Intervention, and Referral to Treatment (SBIRT)    Screening  Typical number of drinks in a day: 0  Typical number of drinks in a week: 0  Interpretation: Low risk drinking behavior. AUDIT-C Screenin) How often did you have a drink containing alcohol in the past year? monthly or less  2) How many drinks did you have on a typical day when you were drinking in the past year?  1 to 2  3) How often did you have 6 or more drinks on one occasion in the past year? never    AUDIT-C Score: 1  Interpretation: Score 0-2 (female): Negative screen for alcohol misuse    Single Item Drug Screening:  How often have you used an illegal drug (including marijuana) or a prescription medication for non-medical reasons in the past year? never    Single Item Drug Screen Score: 0  Interpretation: Negative screen for possible drug use disorder    No results found. Physical Exam:     /78   Pulse 75   Temp (!) 97.3 °F (36.3 °C) (Temporal)   Ht 4' 11" (1.499 m)   Wt 77.2 kg (170 lb 3.2 oz)   SpO2 96%   BMI 34.38 kg/m²     Physical Exam  Vitals reviewed. Constitutional:       Appearance: Normal appearance. Comments: Pleasant 19-year-old female who appears younger than her stated age   HENT:      Right Ear: Tympanic membrane normal.      Left Ear: Tympanic membrane normal.      Nose: Nose normal.      Mouth/Throat:      Mouth: Mucous membranes are moist.   Eyes:      Pupils: Pupils are equal, round, and reactive to light. Neck:      Vascular: No carotid bruit. Cardiovascular:      Rate and Rhythm: Normal rate and regular rhythm. Pulses: Normal pulses. Pulmonary:      Effort: Pulmonary effort is normal.      Breath sounds: Normal breath sounds. Abdominal:      General: Bowel sounds are normal.      Palpations: Abdomen is soft. There is no mass. Comments: No abdominal bruit   Musculoskeletal:      Comments: Gait is fairly steady. No ataxia   Neurological:      Mental Status: She is alert and oriented to person, place, and time. Psychiatric:         Mood and Affect: Mood normal.         Behavior: Behavior normal.         Thought Content:  Thought content normal.         Judgment: Judgment normal.          Keesha Hale,

## 2023-07-07 NOTE — PATIENT INSTRUCTIONS
Medicare Preventive Visit Patient Instructions  Thank you for completing your Welcome to Medicare Visit or Medicare Annual Wellness Visit today. Your next wellness visit will be due in one year (7/7/2024). The screening/preventive services that you may require over the next 5-10 years are detailed below. Some tests may not apply to you based off risk factors and/or age. Screening tests ordered at today's visit but not completed yet may show as past due. Also, please note that scanned in results may not display below. Preventive Screenings:  Service Recommendations Previous Testing/Comments   Colorectal Cancer Screening  * Colonoscopy    * Fecal Occult Blood Test (FOBT)/Fecal Immunochemical Test (FIT)  * Fecal DNA/Cologuard Test  * Flexible Sigmoidoscopy Age: 43-73 years old   Colonoscopy: every 10 years (may be performed more frequently if at higher risk)  OR  FOBT/FIT: every 1 year  OR  Cologuard: every 3 years  OR  Sigmoidoscopy: every 5 years  Screening may be recommended earlier than age 39 if at higher risk for colorectal cancer. Also, an individualized decision between you and your healthcare provider will decide whether screening between the ages of 77-80 would be appropriate. Colonoscopy: Not on file  FOBT/FIT: Not on file  Cologuard: Not on file  Sigmoidoscopy: Not on file          Breast Cancer Screening Age: 36 years old  Frequency: every 1-2 years  Not required if history of left and right mastectomy Mammogram: 11/07/2022    Screening Current   Cervical Cancer Screening Between the ages of 21-29, pap smear recommended once every 3 years. Between the ages of 32-69, can perform pap smear with HPV co-testing every 5 years.    Recommendations may differ for women with a history of total hysterectomy, cervical cancer, or abnormal pap smears in past. Pap Smear: Not on file    Screening Not Indicated   Hepatitis C Screening Once for adults born between 1945 and 1965  More frequently in patients at high risk for Hepatitis C Hep C Antibody: Not on file        Diabetes Screening 1-2 times per year if you're at risk for diabetes or have pre-diabetes Fasting glucose: 93 mg/dL (5/1/2023)  A1C: 5.5 % (6/28/2023)  Screening Current   Cholesterol Screening Once every 5 years if you don't have a lipid disorder. May order more often based on risk factors. Lipid panel: 06/28/2023    Screening Not Indicated  History Lipid Disorder     Other Preventive Screenings Covered by Medicare:  1. Abdominal Aortic Aneurysm (AAA) Screening: covered once if your at risk. You're considered to be at risk if you have a family history of AAA. 2. Lung Cancer Screening: covers low dose CT scan once per year if you meet all of the following conditions: (1) Age 48-67; (2) No signs or symptoms of lung cancer; (3) Current smoker or have quit smoking within the last 15 years; (4) You have a tobacco smoking history of at least 20 pack years (packs per day multiplied by number of years you smoked); (5) You get a written order from a healthcare provider. 3. Glaucoma Screening: covered annually if you're considered high risk: (1) You have diabetes OR (2) Family history of glaucoma OR (3)  aged 48 and older OR (3)  American aged 72 and older  3. Osteoporosis Screening: covered every 2 years if you meet one of the following conditions: (1) You're estrogen deficient and at risk for osteoporosis based off medical history and other findings; (2) Have a vertebral abnormality; (3) On glucocorticoid therapy for more than 3 months; (4) Have primary hyperparathyroidism; (5) On osteoporosis medications and need to assess response to drug therapy. · Last bone density test (DXA Scan): 07/14/2021.  5. HIV Screening: covered annually if you're between the age of 15-65. Also covered annually if you are younger than 13 and older than 72 with risk factors for HIV infection.  For pregnant patients, it is covered up to 3 times per pregnancy. Immunizations:  Immunization Recommendations   Influenza Vaccine Annual influenza vaccination during flu season is recommended for all persons aged >= 6 months who do not have contraindications   Pneumococcal Vaccine   * Pneumococcal conjugate vaccine = PCV13 (Prevnar 13), PCV15 (Vaxneuvance), PCV20 (Prevnar 20)  * Pneumococcal polysaccharide vaccine = PPSV23 (Pneumovax) Adults 20-63 years old: 1-3 doses may be recommended based on certain risk factors  Adults 72 years old: 1-2 doses may be recommended based off what pneumonia vaccine you previously received   Hepatitis B Vaccine 3 dose series if at intermediate or high risk (ex: diabetes, end stage renal disease, liver disease)   Tetanus (Td) Vaccine - COST NOT COVERED BY MEDICARE PART B Following completion of primary series, a booster dose should be given every 10 years to maintain immunity against tetanus. Td may also be given as tetanus wound prophylaxis. Tdap Vaccine - COST NOT COVERED BY MEDICARE PART B Recommended at least once for all adults. For pregnant patients, recommended with each pregnancy. Shingles Vaccine (Shingrix) - COST NOT COVERED BY MEDICARE PART B  2 shot series recommended in those aged 48 and above     Health Maintenance Due:  There are no preventive care reminders to display for this patient. Immunizations Due:      Topic Date Due   • COVID-19 Vaccine (5 - Moderna series) 06/15/2022   • Influenza Vaccine (1) 09/01/2023     Advance Directives   What are advance directives? Advance directives are legal documents that state your wishes and plans for medical care. These plans are made ahead of time in case you lose your ability to make decisions for yourself. Advance directives can apply to any medical decision, such as the treatments you want, and if you want to donate organs. What are the types of advance directives? There are many types of advance directives, and each state has rules about how to use them.  You may choose a combination of any of the following:  · Living will: This is a written record of the treatment you want. You can also choose which treatments you do not want, which to limit, and which to stop at a certain time. This includes surgery, medicine, IV fluid, and tube feedings. · Durable power of  for healthcare [facility]): This is a written record that states who you want to make healthcare choices for you when you are unable to make them for yourself. This person, called a proxy, is usually a family member or a friend. You may choose more than 1 proxy. · Do not resuscitate (DNR) order:  A DNR order is used in case your heart stops beating or you stop breathing. It is a request not to have certain forms of treatment, such as CPR. A DNR order may be included in other types of advance directives. · Medical directive: This covers the care that you want if you are in a coma, near death, or unable to make decisions for yourself. You can list the treatments you want for each condition. Treatment may include pain medicine, surgery, blood transfusions, dialysis, IV or tube feedings, and a ventilator (breathing machine). · Values history: This document has questions about your views, beliefs, and how you feel and think about life. This information can help others choose the care that you would choose. Why are advance directives important? An advance directive helps you control your care. Although spoken wishes may be used, it is better to have your wishes written down. Spoken wishes can be misunderstood, or not followed. Treatments may be given even if you do not want them. An advance directive may make it easier for your family to make difficult choices about your care. Fall Prevention    Fall prevention  includes ways to make your home and other areas safer. It also includes ways you can move more carefully to prevent a fall.  Health conditions that cause changes in your blood pressure, vision, or muscle strength and coordination may increase your risk for falls. Medicines may also increase your risk for falls if they make you dizzy, weak, or sleepy. Fall prevention tips:   · Stand or sit up slowly. · Use assistive devices as directed. · Wear shoes that fit well and have soles that . · Wear a personal alarm. · Stay active. · Manage your medical conditions. Home Safety Tips:  · Add items to prevent falls in the bathroom. · Keep paths clear. · Install bright lights in your home. · Keep items you use often on shelves within reach. · Paint or place reflective tape on the edges of your stairs. Urinary Incontinence   Urinary incontinence (UI)  is when you lose control of your bladder. UI develops because your bladder cannot store or empty urine properly. The 3 most common types of UI are stress incontinence, urge incontinence, or both. Medicines:   · May be given to help strengthen your bladder control. Report any side effects of medication to your healthcare provider. Do pelvic muscle exercises often:  Your pelvic muscles help you stop urinating. Squeeze these muscles tight for 5 seconds, then relax for 5 seconds. Gradually work up to squeezing for 10 seconds. Do 3 sets of 15 repetitions a day, or as directed. This will help strengthen your pelvic muscles and improve bladder control. Train your bladder:  Go to the bathroom at set times, such as every 2 hours, even if you do not feel the urge to go. You can also try to hold your urine when you feel the urge to go. For example, hold your urine for 5 minutes when you feel the urge to go. As that becomes easier, hold your urine for 10 minutes. Self-care:   · Keep a UI record. Write down how often you leak urine and how much you leak. Make a note of what you were doing when you leaked urine. · Drink liquids as directed. You may need to limit the amount of liquid you drink to help control your urine leakage.  Do not drink any liquid right before you go to bed. Limit or do not have drinks that contain caffeine or alcohol. · Prevent constipation. Eat a variety of high-fiber foods. Good examples are high-fiber cereals, beans, vegetables, and whole-grain breads. Walking is the best way to trigger your intestines to have a bowel movement. · Exercise regularly and maintain a healthy weight. Weight loss and exercise will decrease pressure on your bladder and help you control your leakage. · Use a catheter as directed  to help empty your bladder. A catheter is a tiny, plastic tube that is put into your bladder to drain your urine. · Go to behavior therapy as directed. Behavior therapy may be used to help you learn to control your urge to urinate. Weight Management   Why it is important to manage your weight:  Being overweight increases your risk of health conditions such as heart disease, high blood pressure, type 2 diabetes, and certain types of cancer. It can also increase your risk for osteoarthritis, sleep apnea, and other respiratory problems. Aim for a slow, steady weight loss. Even a small amount of weight loss can lower your risk of health problems. How to lose weight safely:  A safe and healthy way to lose weight is to eat fewer calories and get regular exercise. You can lose up about 1 pound a week by decreasing the number of calories you eat by 500 calories each day. Healthy meal plan for weight management:  A healthy meal plan includes a variety of foods, contains fewer calories, and helps you stay healthy. A healthy meal plan includes the following:  · Eat whole-grain foods more often. A healthy meal plan should contain fiber. Fiber is the part of grains, fruits, and vegetables that is not broken down by your body. Whole-grain foods are healthy and provide extra fiber in your diet. Some examples of whole-grain foods are whole-wheat breads and pastas, oatmeal, brown rice, and bulgur.   · Eat a variety of vegetables every day. Include dark, leafy greens such as spinach, kale, charity greens, and mustard greens. Eat yellow and orange vegetables such as carrots, sweet potatoes, and winter squash. · Eat a variety of fruits every day. Choose fresh or canned fruit (canned in its own juice or light syrup) instead of juice. Fruit juice has very little or no fiber. · Eat low-fat dairy foods. Drink fat-free (skim) milk or 1% milk. Eat fat-free yogurt and low-fat cottage cheese. Try low-fat cheeses such as mozzarella and other reduced-fat cheeses. · Choose meat and other protein foods that are low in fat. Choose beans or other legumes such as split peas or lentils. Choose fish, skinless poultry (chicken or turkey), or lean cuts of red meat (beef or pork). Before you cook meat or poultry, cut off any visible fat. · Use less fat and oil. Try baking foods instead of frying them. Add less fat, such as margarine, sour cream, regular salad dressing and mayonnaise to foods. Eat fewer high-fat foods. Some examples of high-fat foods include french fries, doughnuts, ice cream, and cakes. · Eat fewer sweets. Limit foods and drinks that are high in sugar. This includes candy, cookies, regular soda, and sweetened drinks. Exercise:  Exercise at least 30 minutes per day on most days of the week. Some examples of exercise include walking, biking, dancing, and swimming. You can also fit in more physical activity by taking the stairs instead of the elevator or parking farther away from stores. Ask your healthcare provider about the best exercise plan for you. © Copyright [employer] 2018 Information is for End User's use only and may not be sold, redistributed or otherwise used for commercial purposes.  All illustrations and images included in CareNotes® are the copyrighted property of [employer] or [address] [place] [address]

## 2023-07-14 ENCOUNTER — CONSULT (OUTPATIENT)
Dept: PAIN MEDICINE | Facility: CLINIC | Age: 83
End: 2023-07-14
Payer: MEDICARE

## 2023-07-14 VITALS
BODY MASS INDEX: 34.27 KG/M2 | HEIGHT: 59 IN | WEIGHT: 170 LBS | DIASTOLIC BLOOD PRESSURE: 80 MMHG | SYSTOLIC BLOOD PRESSURE: 135 MMHG

## 2023-07-14 DIAGNOSIS — M54.16 LUMBAR RADICULOPATHY: Primary | ICD-10-CM

## 2023-07-14 DIAGNOSIS — M43.10 SPONDYLOLISTHESIS, ACQUIRED: ICD-10-CM

## 2023-07-14 DIAGNOSIS — M48.062 SPINAL STENOSIS OF LUMBAR REGION WITH NEUROGENIC CLAUDICATION: ICD-10-CM

## 2023-07-14 PROCEDURE — 99204 OFFICE O/P NEW MOD 45 MIN: CPT | Performed by: ANESTHESIOLOGY

## 2023-07-14 NOTE — PATIENT INSTRUCTIONS
Discuss gabapentin 100mg with your PCP. Can contact us for repeat epidural steroid injection. Can consider spinal cord stimulator if injections fail.

## 2023-07-14 NOTE — PROGRESS NOTES
Assessment  1. Lumbar radiculopathy    2. Spinal stenosis of lumbar region with neurogenic claudication    3. Spondylolisthesis, acquired        Plan  Patient presenting with chronic back pain for 15+ years. During today's evaluation patient is demonstrating pain that is consistent with lumbar spinal stenosis, lumbar radiculopathy, lumbar spondylosis, spondylolisthesis. Pain is consistent with lumbar radicular pain accompanied by pain at times>7/10 on the pain scale with inability to participate in IADLs for >6 weeks. Patient has fully participated with physical therapy as well as home exercises and stretches. Has been taking extra strength Tylenol with modest benefit. Denies any bowel or bladder symptoms, saddle anesthesia. Patient does endorse gait instability. In regards to the patient's lumbar pathology, we discussed the various treatment options including physical therapy, chiropractic treatment, medication management, activity modifications, interventional spine procedures. Given that patient has not had any benefit with conservative treatments, I think patient would benefit from targeted interventional treatment modalities. I reviewed and interpreted relevant imaging studies - specifically lumbar MRI and discussed the results and clinical significance with the patient. Patient does have severe spinal stenosis at L4-5 which is stable from her prior MRIs. Patient also has moderate stenosis at L2-3, L3-4 as well as multilevel degenerative lumbar spondylosis. Patient has been receiving epidural steroid injections at [facility] for many years and reports that she does have benefit with these. Most recently she had this done in January and June of this year. She did suffer a fall after her most recent epidural which worsened her pain recently. She presents today to discuss any additional options for her lumbar spine issues.     Discussed that I would continue with offering her epidural steroid injections for her lumbar radicular symptoms. She does have severe spinal stenosis and is not a candidate for spinal surgery. If epidural injections were to fail offering benefit, I would offer her a spinal cord stimulator trial.    We also discussed that gabapentin may be indicated for her neuropathic symptoms. She previously tried 300 mg which caused dizziness. Discussed that there is a 100 mg dosage that she can try. Patient wants to discuss this further with her primary care physician. Patient will contact our office on an as-needed basis if she wants to proceed with repeat L5-S1 interlaminar epidural steroid injection. She does also have a follow-up with her active pain management office sometime in September as well. I reviewed external notes from the relevant aspects of the patient's medical record, specifically external pain management, primary care physician notes in regards to current and prior treatments tried (as mentioned in history of present illness). I reviewed pertinent laboratory studies, specifically renal function, hemoglobin A1c, CBC, coagulation studies, prior to recommending medication therapies/interventional treatment options. [place] Prescription Drug Monitoring Program report was reviewed and was appropriate     My impressions and treatment recommendations were discussed in detail with the patient who verbalized understanding and had no further questions. Discharge instructions were provided. I personally saw and examined the patient and I agree with the above discussed plan of care. No orders of the defined types were placed in this encounter. No orders of the defined types were placed in this encounter. History of Present Illness    John Nelson is a 79 y.o. female presenting [facility] for exam and evaluation of chronic  pain for 15+ years. Pain started without any precipitating injury or trauma. Pain was recently exacerbated after a fall occurring on 6/19/2023. Over the past month, the intensity of pain has been Moderate. Pain is currently 6/10. Pain does interfere with age appropriate activities of daily living. Pain is intermittent, with no typical pattern throughout the day. Pain is described as cramping associated with spasms and numbness. Patient endorses weakness in the lower extremities. Assistance device used: [address] and Walker. Pain is increased with standing, bending, walking, exercise. Pain is decreased with rest, lying down, sitting. Prior pertinent treatments tried: Physical therapy, injection therapies, exercise, heat/ice. Pertinent medications tried/currently taking: Lidocaine patch, acetaminophen, gabapentin      I have personally reviewed and/or updated the patient's past medical history, past surgical history, family history, social history, current medications, allergies, and vital signs today. Review of Systems   Constitutional: Negative for chills and fever. HENT: Negative for ear pain and sore throat. Eyes: Negative for pain and visual disturbance. Respiratory: Negative for cough and shortness of breath. Cardiovascular: Negative for chest pain and palpitations. Gastrointestinal: Negative for abdominal pain and vomiting. Genitourinary: Negative for dysuria and hematuria. Musculoskeletal: Positive for arthralgias, back pain, gait problem and myalgias. Skin: Negative for color change and rash. Neurological: Positive for dizziness and numbness. Negative for seizures and syncope. All other systems reviewed and are negative.       Patient Active Problem List   Diagnosis   • Adult hypothyroidism   • Glaucoma   • Heel spur, left   • Hyperlipemia   • Vitamin D deficiency   • Primary osteoarthritis of both knees   • Peripheral neuropathy   • Meningioma [facility])   • Lumbar radiculopathy   • Spinal stenosis of lumbar region   • Right upper quadrant abdominal pain   • Fatty liver   • Cavernous hemangioma of liver   • Asthma   • Autoimmune disease ([address])   • Disorder of lipid metabolism   • Disorder of uterus   • Polyp of corpus uteri   • Spondylolisthesis, acquired   • Localized, primary osteoarthritis   • Lumbosacral radiculitis   • Pseudoclaudication syndrome   • Rupture of right long head biceps tendon   • Genetic testing   • Family history of pancreatic cancer   • Prediabetes   • Axonal neuropathy   • Postmenopausal bleeding   • Stage 3b chronic kidney disease ([address])   • Thyroid condition       Past Medical History:   Diagnosis Date   • Arthritis    • Colon polyps    • Disease of thyroid gland    • Hyperlipidemia    • Migraine    • Pure hypercholesterolemia    • Thyroid disease        Past Surgical History:   Procedure Laterality Date   • BREAST BIOPSY Right     benign many yrs ago   • DILATION AND CURETTAGE, DIAGNOSTIC / THERAPEUTIC     • MAMMO STEREOTACTIC BREAST BIOPSY RIGHT (ALL INC) Right     yrs ago-benign   • TONSILLECTOMY     • TUBAL LIGATION         Family History   Problem Relation Age of Onset   • Pancreatic cancer Mother 79        susceptibility    • Kidney disease Father    • Pancreatic cancer Maternal Aunt         over 48   • Pancreatic cancer Paternal Aunt         over 48   • No Known Problems Maternal Grandmother    • No Known Problems Paternal Grandmother    • No Known Problems Maternal Aunt    • No Known Problems Maternal Aunt    • No Known Problems Maternal Aunt    • No Known Problems Paternal Aunt        Social History     Occupational History   • Not on file   Tobacco Use   • Smoking status: Former     Types: Cigarettes     Quit date:      Years since quittin.5   • Smokeless tobacco: Never   Vaping Use   • Vaping Use: Never used   Substance and Sexual Activity   • Alcohol use:  Yes     Alcohol/week: 1.0 standard drink of alcohol     Types: 1 Glasses of wine per week     Comment: social    • Drug use: No   • Sexual activity: Not on file       Current Outpatient Medications on File Prior to Visit   Medication Sig   • acetaminophen (TYLENOL) 650 mg CR tablet Take 650 mg by mouth every 8 (eight) hours as needed for mild pain   • albuterol (PROVENTIL HFA,VENTOLIN HFA) 90 mcg/act inhaler Inhale 2 puffs every 6 (six) hours as needed for wheezing   • Cholecalciferol (VITAMIN D) 2000 units CAPS Take 1 tablet by mouth daily   • co-enzyme Q-10 30 MG capsule Take 30 mg by mouth daily   • cyanocobalamin (VITAMIN B-12) 1000 MCG tablet Take 1 tablet by mouth in the morning   • furosemide (LASIX) 20 mg tablet Take 20 mg by mouth daily   • levothyroxine 50 mcg tablet TAKE 1 TABLET (50 MCG TOTAL) BY MOUTH DAILY   • Lumigan 0.01 % ophthalmic drops USE 1 DROP IN EACH EYE EVERY NIGHT AT BEDTIME   • Multiple Vitamins-Minerals (EMERGEN-C IMMUNE PO) Vitamin C   daily   • pravastatin (PRAVACHOL) 20 mg tablet TAKE 1 TABLET (20 MG TOTAL) BY MOUTH EVERY OTHER DAY     No current facility-administered medications on file prior to visit. Allergies   Allergen Reactions   • Molds & Smuts Shortness Of Breath   • Penicillins Hives, Rash and Edema     Category: Allergy; Other reaction(s): Other (see comments)  Category: Allergy; Physical Exam    /80   Ht 4' 11" (1.499 m)   Wt 77.1 kg (170 lb)   BMI 34.34 kg/m²     Constitutional: normal, well developed, well nourished, alert, in no distress and non-toxic and no overt pain behavior. Eyes: anicteric  HEENT: grossly intact  Neck: supple, symmetric, trachea midline and no masses   Pulmonary:even and unlabored  Cardiovascular:Bilateral lower extremity edema. Skin:Normal without rashes or lesions and well hydrated  Psychiatric:Mood and affect appropriate  Neurologic: Motor function is grossly intact with no focal neurologic deficits. Musculoskeletal: Gait is antalgic, slow or with use of cane. Imaging  MRI lumbar spine 9/1/2022:   History: Low back pain, lumbar radiculopathy     Procedure: MRI of the lumbar spine was obtained with the following sequences: Sagittal T1, sagittal T2, sagittal STIR and axial T2 weighted images. Comparison: MRI dated 4/11/2019     Findings: For the purposes of this dictation, the lumbar vertebrae are labeled from a caudal to cranial direction, the first vertebra with lumbar morphology is labeled as L5. There is minimal dextroscoliotic curvature of lumbar spine. The lumbar lordosis is preserved. Vertebral body heights are maintained. Hemangioma is noted in the L4 vertebral body. There are degenerative endplate changes at C79-R38 and minimally at L4-L5 on the left. There is partial osseous fusion across the L5-S1 disc space anteriorly, with mild Modic type II degenerative endplate changes at this level. The conus terminates at the L1-L2 level and appears normal in signal.     There are degenerative changes in the imaged lower thoracic spine, with mild spinal canal narrowing at T11-T12 and T12-L1, though no definite impingement on the distal spinal cord. There is neural foraminal narrowing on the left at T11-T12. L1-L2: No spinal canal or neural foraminal narrowing. L2-L3 : Mild disc bulge, in conjunction with mild facet arthrosis and thickening of the ligamentum flavum. There is mild to moderate narrowing of the spinal canal. No neural foraminal narrowing. L3-L4: Disc bulge, facet arthrosis and thickening of the ligamentum flavum, with moderate spinal stenosis and narrowing of the subarticular recesses. There is mild right and mild-to-moderate left neural foraminal narrowing. Findings are unchanged. L4-L5: Approximately 8 mm anterolisthesis of L4 over L5. There is disc bulge, facet arthrosis and thickening of ligamentum flavum, with stable severe spinal stenosis causing crowding of the cauda equina nerve roots. There is moderate neural foraminal narrowing with impingement on the bilateral exiting L4 nerve roots.      L5-S1: Facet arthrosis and marginal osteophytes, without substantial spinal canal or neural foraminal narrowing. IMPRESSION:   1. Stable multifactorial severe spinal stenosis at L4-L5. There is moderate spinal stenosis at L3-L4 and mild to moderate spinal stenosis at L2-L3. 2. Neural foraminal narrowing at L4-L5, with impingement on the bilateral   exiting L4 nerve roots, similar to the prior study.

## 2023-09-08 DIAGNOSIS — E03.9 ADULT HYPOTHYROIDISM: ICD-10-CM

## 2023-09-08 RX ORDER — LEVOTHYROXINE SODIUM 0.05 MG/1
50 TABLET ORAL DAILY
Qty: 90 TABLET | Refills: 3 | Status: SHIPPED | OUTPATIENT
Start: 2023-09-08

## 2023-10-17 ENCOUNTER — TELEPHONE (OUTPATIENT)
Dept: FAMILY MEDICINE CLINIC | Facility: CLINIC | Age: 83
End: 2023-10-17

## 2023-10-17 DIAGNOSIS — M43.10 SPONDYLOLISTHESIS, ACQUIRED: ICD-10-CM

## 2023-10-17 DIAGNOSIS — M17.0 PRIMARY OSTEOARTHRITIS OF BOTH KNEES: Primary | ICD-10-CM

## 2023-10-17 DIAGNOSIS — M19.91 LOCALIZED, PRIMARY OSTEOARTHRITIS: ICD-10-CM

## 2023-10-17 DIAGNOSIS — M48.56XA COLLAPSED VERTEBRA, NOT ELSEWHERE CLASSIFIED, LUMBAR REGION, INITIAL ENCOUNTER FOR FRACTURE (HCC): ICD-10-CM

## 2023-10-17 DIAGNOSIS — M54.16 LUMBAR RADICULOPATHY: ICD-10-CM

## 2023-10-17 DIAGNOSIS — Z12.31 ENCOUNTER FOR SCREENING MAMMOGRAM FOR MALIGNANT NEOPLASM OF BREAST: ICD-10-CM

## 2023-10-17 DIAGNOSIS — M54.17 LUMBOSACRAL RADICULITIS: ICD-10-CM

## 2023-10-17 NOTE — TELEPHONE ENCOUNTER
Would you recommend he RSV shot for the patient? She is wondering due to her age and having asthma. Also she got notified that she is due for her mammo and a dexa scan. She would like referral placed for both of them.

## 2023-11-03 DIAGNOSIS — E78.49 OTHER HYPERLIPIDEMIA: ICD-10-CM

## 2023-11-05 RX ORDER — PRAVASTATIN SODIUM 20 MG
20 TABLET ORAL EVERY OTHER DAY
Qty: 45 TABLET | Refills: 2 | Status: SHIPPED | OUTPATIENT
Start: 2023-11-05

## 2023-11-21 RX ORDER — RESPIRATORY SYNCYTIAL VIRUS VACCINE 120MCG/0.5
KIT INTRAMUSCULAR
Qty: 1 EACH | Refills: 0 | OUTPATIENT
Start: 2023-11-21

## 2023-12-05 ENCOUNTER — TELEPHONE (OUTPATIENT)
Dept: FAMILY MEDICINE CLINIC | Facility: CLINIC | Age: 83
End: 2023-12-05

## 2023-12-05 DIAGNOSIS — E03.9 ADULT HYPOTHYROIDISM: Primary | ICD-10-CM

## 2023-12-05 DIAGNOSIS — E78.01 FAMILIAL HYPERCHOLESTEROLEMIA: ICD-10-CM

## 2023-12-05 DIAGNOSIS — E55.9 VITAMIN D DEFICIENCY: ICD-10-CM

## 2023-12-05 NOTE — TELEPHONE ENCOUNTER
Patient called and is asking to see if she needs any blood work before her appointment in April. Also, she is asking if she would be able to get the RSV vaccination. Please advise patient at [phone].

## 2023-12-05 NOTE — TELEPHONE ENCOUNTER
She is also asking about if she would be able to get the RSV vaccination, I did tell her to call her insurance company as well to make sure that it is covered in a physician's office or if she needs to go to a pharmacy.

## 2023-12-07 ENCOUNTER — CLINICAL SUPPORT (OUTPATIENT)
Dept: FAMILY MEDICINE CLINIC | Facility: CLINIC | Age: 83
End: 2023-12-07
Payer: MEDICARE

## 2023-12-07 DIAGNOSIS — Z23 ENCOUNTER FOR IMMUNIZATION: Primary | ICD-10-CM

## 2023-12-07 PROCEDURE — 90678 RSV VACC PREF BIVALENT IM: CPT

## 2023-12-07 PROCEDURE — 90471 IMMUNIZATION ADMIN: CPT

## 2024-01-18 ENCOUNTER — TELEPHONE (OUTPATIENT)
Age: 84
End: 2024-01-18

## 2024-01-18 NOTE — TELEPHONE ENCOUNTER
Patient called, stating she was billed a large sum for her RSV vaccination, given 12/7 in office.  She did speak to Flavia in Billing initially, who advised her to call Medicare.  Medicare then told her this should've been billed to her Part D, [employer].  She tried to call billing again, and got a message that they are in a staff meeting.  I will contact billing on her behalf, but wanted to note this in chart for future reference.    [employer] Part D  Member ID [id number]  [id number]  [phone]

## 2024-02-28 ENCOUNTER — TELEPHONE (OUTPATIENT)
Age: 84
End: 2024-02-28

## 2024-02-28 NOTE — TELEPHONE ENCOUNTER
Pt called to see if all her lab orders were in so she can go prior to upcoming appt. I advised pt further

## 2024-03-01 ENCOUNTER — RA CDI HCC (OUTPATIENT)
Dept: OTHER | Facility: HOSPITAL | Age: 84
End: 2024-03-01

## 2024-03-04 ENCOUNTER — APPOINTMENT (OUTPATIENT)
Dept: LAB | Facility: CLINIC | Age: 84
End: 2024-03-04
Payer: MEDICARE

## 2024-03-04 DIAGNOSIS — E78.01 FAMILIAL HYPERCHOLESTEROLEMIA: ICD-10-CM

## 2024-03-04 DIAGNOSIS — E55.9 VITAMIN D DEFICIENCY: ICD-10-CM

## 2024-03-04 DIAGNOSIS — E03.9 ADULT HYPOTHYROIDISM: ICD-10-CM

## 2024-03-04 LAB
25(OH)D3 SERPL-MCNC: 61.9 NG/ML (ref 30–100)
TSH SERPL DL<=0.05 MIU/L-ACNC: 1.47 UIU/ML (ref 0.45–4.5)

## 2024-03-04 PROCEDURE — 36415 COLL VENOUS BLD VENIPUNCTURE: CPT

## 2024-03-04 PROCEDURE — 82306 VITAMIN D 25 HYDROXY: CPT

## 2024-03-04 PROCEDURE — 80061 LIPID PANEL: CPT

## 2024-03-04 PROCEDURE — 80053 COMPREHEN METABOLIC PANEL: CPT

## 2024-03-04 PROCEDURE — 84443 ASSAY THYROID STIM HORMONE: CPT

## 2024-03-05 LAB
ALBUMIN SERPL BCP-MCNC: 4.2 G/DL (ref 3.5–5)
ALP SERPL-CCNC: 69 U/L (ref 34–104)
ALT SERPL W P-5'-P-CCNC: 20 U/L (ref 7–52)
ANION GAP SERPL CALCULATED.3IONS-SCNC: 10 MMOL/L
AST SERPL W P-5'-P-CCNC: 25 U/L (ref 13–39)
BILIRUB SERPL-MCNC: 0.5 MG/DL (ref 0.2–1)
BUN SERPL-MCNC: 10 MG/DL (ref 5–25)
CALCIUM SERPL-MCNC: 9.5 MG/DL (ref 8.4–10.2)
CHLORIDE SERPL-SCNC: 98 MMOL/L (ref 96–108)
CHOLEST SERPL-MCNC: 178 MG/DL
CO2 SERPL-SCNC: 27 MMOL/L (ref 21–32)
CREAT SERPL-MCNC: 0.8 MG/DL (ref 0.6–1.3)
GFR SERPL CREATININE-BSD FRML MDRD: 67 ML/MIN/1.73SQ M
GLUCOSE P FAST SERPL-MCNC: 78 MG/DL (ref 65–99)
HDLC SERPL-MCNC: 54 MG/DL
LDLC SERPL CALC-MCNC: 107 MG/DL (ref 0–100)
POTASSIUM SERPL-SCNC: 4.5 MMOL/L (ref 3.5–5.3)
PROT SERPL-MCNC: 6.8 G/DL (ref 6.4–8.4)
SODIUM SERPL-SCNC: 135 MMOL/L (ref 135–147)
TRIGL SERPL-MCNC: 83 MG/DL

## 2024-03-08 ENCOUNTER — OFFICE VISIT (OUTPATIENT)
Dept: FAMILY MEDICINE CLINIC | Facility: CLINIC | Age: 84
End: 2024-03-08
Payer: MEDICARE

## 2024-03-08 VITALS
DIASTOLIC BLOOD PRESSURE: 78 MMHG | TEMPERATURE: 97 F | HEIGHT: 59 IN | HEART RATE: 69 BPM | WEIGHT: 166.8 LBS | SYSTOLIC BLOOD PRESSURE: 138 MMHG | RESPIRATION RATE: 16 BRPM | BODY MASS INDEX: 33.63 KG/M2 | OXYGEN SATURATION: 95 %

## 2024-03-08 DIAGNOSIS — M48.062 SPINAL STENOSIS OF LUMBAR REGION WITH NEUROGENIC CLAUDICATION: ICD-10-CM

## 2024-03-08 DIAGNOSIS — E03.9 ACQUIRED HYPOTHYROIDISM: Primary | ICD-10-CM

## 2024-03-08 DIAGNOSIS — E78.01 FAMILIAL HYPERCHOLESTEROLEMIA: ICD-10-CM

## 2024-03-08 DIAGNOSIS — M54.16 LUMBAR RADICULOPATHY: ICD-10-CM

## 2024-03-08 DIAGNOSIS — J45.20 MILD INTERMITTENT ASTHMA WITHOUT COMPLICATION: ICD-10-CM

## 2024-03-08 PROBLEM — M77.32 HEEL SPUR, LEFT: Status: RESOLVED | Noted: 2017-01-18 | Resolved: 2024-03-08

## 2024-03-08 PROBLEM — S46.111A RUPTURE OF RIGHT LONG HEAD BICEPS TENDON: Status: RESOLVED | Noted: 2018-09-26 | Resolved: 2024-03-08

## 2024-03-08 PROBLEM — R10.11 RIGHT UPPER QUADRANT ABDOMINAL PAIN: Status: RESOLVED | Noted: 2018-02-06 | Resolved: 2024-03-08

## 2024-03-08 PROBLEM — N95.0 POSTMENOPAUSAL BLEEDING: Status: RESOLVED | Noted: 2019-12-16 | Resolved: 2024-03-08

## 2024-03-08 PROCEDURE — 99214 OFFICE O/P EST MOD 30 MIN: CPT | Performed by: FAMILY MEDICINE

## 2024-03-08 PROCEDURE — G2211 COMPLEX E/M VISIT ADD ON: HCPCS | Performed by: FAMILY MEDICINE

## 2024-03-08 RX ORDER — ALBUTEROL SULFATE 90 UG/1
2 AEROSOL, METERED RESPIRATORY (INHALATION) EVERY 6 HOURS PRN
Qty: 8.5 G | Refills: 1 | Status: SHIPPED | OUTPATIENT
Start: 2024-03-08

## 2024-03-08 NOTE — PROGRESS NOTES
Name: Yennifer Vanegas      : 1940      MRN: [id number]  Encounter Provider: Yennifer Cuevas DO  Encounter Date: 3/8/2024   Encounter department: [facility]    Assessment & Plan     1. Acquired hypothyroidism  Comments:  Continue same doses of levothyroxine    2. Familial hypercholesterolemia  Comments:  Continue his statin and attempts at dietary compliance    3. Mild intermittent asthma without complication  -     albuterol (PROVENTIL HFA,VENTOLIN HFA) 90 mcg/act inhaler; Inhale 2 puffs every 6 (six) hours as needed for wheezing    4. Spinal stenosis of lumbar region with neurogenic claudication  Comments:  Following up with pain management    5. Lumbar radiculopathy  Comments:  following with pain management           Subjective      Chief Complaint   Patient presents with    Follow-up      83-year-old female here for follow-up and lab review.  Chemistries unremarkable lipids at goal.  Vitamin D and thyroid are normal.  Major issues are gait arthritis balance.     patient follows with nephrology for CKD.  Sees Dr. Evangelista for chronic low back at [facility] for pain management.  Lipids are at goal.  Vitamin D at goal.  Thyroid at goal.  DEXA scan is scheduled patient still continues with mammography.  Review of Systems   Constitutional:  Positive for fatigue.   Musculoskeletal:  Positive for arthralgias (knees bilateral Right > left) and gait problem.   Neurological:         Balance   [employer]-helpful  Does follow-up with [facility].  Component      Latest Ref Rng 3/4/2024   Sodium      135 - 147 mmol/L 135    Potassium      3.5 - 5.3 mmol/L 4.5    Chloride      96 - 108 mmol/L 98    Carbon Dioxide      21 - 32 mmol/L 27    ANION GAP      mmol/L 10    BUN      5 - 25 mg/dL 10    Creatinine      0.60 - 1.30 mg/dL 0.80    GLUCOSE, FASTING      65 - 99 mg/dL 78    Calcium      8.4 - 10.2 mg/dL 9.5    AST      13 - 39 U/L 25    ALT      7 - 52 U/L 20    ALK PHOS      34 - 104 U/L 69    Total  "Protein      6.4 - 8.4 g/dL 6.8    Albumin      3.5 - 5.0 g/dL 4.2    Total Bilirubin      0.20 - 1.00 mg/dL 0.50    GFR, Calculated      ml/min/1.73sq m 67    Cholesterol      See Comment mg/dL 178    Triglycerides      See Comment mg/dL 83    HDL      >=50 mg/dL 54    LDL Calculated      0 - 100 mg/dL 107 (H)    VITAMIN D, 25-HYDROXY      30.0 - 100.0 ng/mL 61.9    TSH 3RD GENERATON      0.450 - 4.500 uIU/mL 1.468           Current Outpatient Medications on File Prior to Visit   Medication Sig    acetaminophen (TYLENOL) 650 mg CR tablet Take 650 mg by mouth every 8 (eight) hours as needed for mild pain    Cholecalciferol (VITAMIN D) 2000 units CAPS Take 1 tablet by mouth daily    co-enzyme Q-10 30 MG capsule Take 30 mg by mouth daily    cyanocobalamin (VITAMIN B-12) 1000 MCG tablet Take 1 tablet by mouth in the morning    furosemide (LASIX) 20 mg tablet Take 20 mg by mouth daily    levothyroxine 50 mcg tablet TAKE 1 TABLET (50 MCG TOTAL) BY MOUTH DAILY    Lumigan 0.01 % ophthalmic drops USE 1 DROP IN EACH EYE EVERY NIGHT AT BEDTIME    Multiple Vitamins-Minerals (EMERGEN-C IMMUNE PO) Vitamin C   daily    pravastatin (PRAVACHOL) 20 mg tablet TAKE 1 TABLET (20 MG TOTAL) BY MOUTH EVERY OTHER DAY       Objective     /78   Pulse 69   Temp (!) 97 °F (36.1 °C) (Temporal)   Resp 16   Ht 4' 11\" (1.499 m)   Wt 75.7 kg (166 lb 12.8 oz)   SpO2 95%   BMI 33.69 kg/m²     Physical Exam  Constitutional:       General: She is not in acute distress.     Appearance: Normal appearance. She is well-developed.      Comments: 83-year-old female appears younger than her stated age with BMI of 33%   HENT:      Head: Normocephalic.      Right Ear: Tympanic membrane normal.      Left Ear: Tympanic membrane normal.      Nose: Nose normal.      Mouth/Throat:      Mouth: Mucous membranes are moist.   Eyes:      Conjunctiva/sclera: Conjunctivae normal.      Pupils: Pupils are equal, round, and reactive to light.   Neck:      " Vascular: No carotid bruit.   Cardiovascular:      Rate and Rhythm: Normal rate and regular rhythm.      Pulses: Normal pulses.      Heart sounds: No murmur heard.  Pulmonary:      Effort: Pulmonary effort is normal.      Breath sounds: Normal breath sounds.   Abdominal:      General: Bowel sounds are normal.      Palpations: Abdomen is soft. There is no mass.      Tenderness: There is no abdominal tenderness.   Musculoskeletal:      Comments: Gait is steady slightly antalgic slightly kyphotic   Skin:     Findings: No rash.   Neurological:      Mental Status: She is alert and oriented to person, place, and time.      Deep Tendon Reflexes: Reflexes are normal and symmetric.   Psychiatric:         Mood and Affect: Mood normal.         Behavior: Behavior normal.         Thought Content: Thought content normal.         Judgment: Judgment normal.       Yennifer Cuevas,

## 2024-03-11 ENCOUNTER — TELEPHONE (OUTPATIENT)
Dept: FAMILY MEDICINE CLINIC | Facility: CLINIC | Age: 84
End: 2024-03-11

## 2024-03-11 NOTE — TELEPHONE ENCOUNTER
Patient dropped off a bill from her nurse visit on 12/7/23 for her RSV shot. After reviewing the bill, patient has medicare so the RSV is not cover her in the office and has to be done at a local pharamcy. I left patient a detail message explaining that and the we will take care of the bill and not to pay it.

## 2024-03-12 ENCOUNTER — HOSPITAL ENCOUNTER (OUTPATIENT)
Dept: BONE DENSITY | Facility: MEDICAL CENTER | Age: 84
Discharge: HOME | End: 2024-03-12
Payer: MEDICARE

## 2024-03-12 ENCOUNTER — HOSPITAL ENCOUNTER (OUTPATIENT)
Dept: MAMMOGRAPHY | Facility: MEDICAL CENTER | Age: 84
Discharge: HOME | End: 2024-03-12
Payer: MEDICARE

## 2024-03-12 VITALS — HEIGHT: 59 IN | WEIGHT: 166.89 LBS | BODY MASS INDEX: 33.64 KG/M2

## 2024-03-12 DIAGNOSIS — Z12.31 ENCOUNTER FOR SCREENING MAMMOGRAM FOR MALIGNANT NEOPLASM OF BREAST: ICD-10-CM

## 2024-03-12 DIAGNOSIS — M43.10 SPONDYLOLISTHESIS, ACQUIRED: ICD-10-CM

## 2024-03-12 DIAGNOSIS — M48.56XA COLLAPSED VERTEBRA, NOT ELSEWHERE CLASSIFIED, LUMBAR REGION, INITIAL ENCOUNTER FOR FRACTURE (HCC): ICD-10-CM

## 2024-03-12 DIAGNOSIS — M19.91 LOCALIZED, PRIMARY OSTEOARTHRITIS: ICD-10-CM

## 2024-03-12 DIAGNOSIS — M54.16 LUMBAR RADICULOPATHY: ICD-10-CM

## 2024-03-12 DIAGNOSIS — M17.0 PRIMARY OSTEOARTHRITIS OF BOTH KNEES: ICD-10-CM

## 2024-03-12 DIAGNOSIS — M54.17 LUMBOSACRAL RADICULITIS: ICD-10-CM

## 2024-03-12 PROCEDURE — 77063 BREAST TOMOSYNTHESIS BI: CPT

## 2024-03-12 PROCEDURE — 77080 DXA BONE DENSITY AXIAL: CPT

## 2024-03-12 PROCEDURE — 77067 SCR MAMMO BI INCL CAD: CPT

## 2024-04-09 DIAGNOSIS — H40.1130 PRIMARY OPEN ANGLE GLAUCOMA OF BOTH EYES, UNSPECIFIED GLAUCOMA STAGE: ICD-10-CM

## 2024-04-09 RX ORDER — BIMATOPROST 0.1 MG/ML
SOLUTION/ DROPS OPHTHALMIC
Qty: 5 ML | Refills: 4 | Status: SHIPPED | OUTPATIENT
Start: 2024-04-09

## 2024-04-24 ENCOUNTER — APPOINTMENT (OUTPATIENT)
Dept: LAB | Facility: CLINIC | Age: 84
End: 2024-04-24
Payer: MEDICARE

## 2024-04-24 DIAGNOSIS — N18.31 STAGE 3A CHRONIC KIDNEY DISEASE (HCC): ICD-10-CM

## 2024-04-24 DIAGNOSIS — E55.9 VITAMIN D DEFICIENCY: ICD-10-CM

## 2024-04-24 LAB
25(OH)D3 SERPL-MCNC: 66.5 NG/ML (ref 30–100)
ALBUMIN SERPL BCP-MCNC: 3.9 G/DL (ref 3.5–5)
ANION GAP SERPL CALCULATED.3IONS-SCNC: 7 MMOL/L (ref 4–13)
BACTERIA UR QL AUTO: NORMAL /HPF
BILIRUB UR QL STRIP: NEGATIVE
BUN SERPL-MCNC: 11 MG/DL (ref 5–25)
CALCIUM SERPL-MCNC: 9.2 MG/DL (ref 8.4–10.2)
CHLORIDE SERPL-SCNC: 100 MMOL/L (ref 96–108)
CLARITY UR: CLEAR
CO2 SERPL-SCNC: 29 MMOL/L (ref 21–32)
COLOR UR: COLORLESS
CREAT SERPL-MCNC: 0.82 MG/DL (ref 0.6–1.3)
CREAT UR-MCNC: 29 MG/DL
ERYTHROCYTE [DISTWIDTH] IN BLOOD BY AUTOMATED COUNT: 14.9 % (ref 11.6–15.1)
GFR SERPL CREATININE-BSD FRML MDRD: 65 ML/MIN/1.73SQ M
GLUCOSE P FAST SERPL-MCNC: 72 MG/DL (ref 65–99)
GLUCOSE UR STRIP-MCNC: NEGATIVE MG/DL
HCT VFR BLD AUTO: 38.5 % (ref 34.8–46.1)
HGB BLD-MCNC: 12.3 G/DL (ref 11.5–15.4)
HGB UR QL STRIP.AUTO: NEGATIVE
KETONES UR STRIP-MCNC: NEGATIVE MG/DL
LEUKOCYTE ESTERASE UR QL STRIP: NEGATIVE
MCH RBC QN AUTO: 29.4 PG (ref 26.8–34.3)
MCHC RBC AUTO-ENTMCNC: 31.9 G/DL (ref 31.4–37.4)
MCV RBC AUTO: 92 FL (ref 82–98)
NITRITE UR QL STRIP: NEGATIVE
NON-SQ EPI CELLS URNS QL MICRO: NORMAL /HPF
PH UR STRIP.AUTO: 7 [PH]
PHOSPHATE SERPL-MCNC: 3.4 MG/DL (ref 2.3–4.1)
PLATELET # BLD AUTO: 243 THOUSANDS/UL (ref 149–390)
PMV BLD AUTO: 9.3 FL (ref 8.9–12.7)
POTASSIUM SERPL-SCNC: 4.5 MMOL/L (ref 3.5–5.3)
PROT UR STRIP-MCNC: NEGATIVE MG/DL
PROT UR-MCNC: <4 MG/DL
PTH-INTACT SERPL-MCNC: 39.9 PG/ML (ref 12–88)
RBC # BLD AUTO: 4.19 MILLION/UL (ref 3.81–5.12)
RBC #/AREA URNS AUTO: NORMAL /HPF
SODIUM SERPL-SCNC: 136 MMOL/L (ref 135–147)
SP GR UR STRIP.AUTO: 1.01 (ref 1–1.03)
UROBILINOGEN UR STRIP-ACNC: <2 MG/DL
WBC # BLD AUTO: 4.44 THOUSAND/UL (ref 4.31–10.16)
WBC #/AREA URNS AUTO: NORMAL /HPF

## 2024-04-24 PROCEDURE — 85027 COMPLETE CBC AUTOMATED: CPT

## 2024-04-24 PROCEDURE — 84156 ASSAY OF PROTEIN URINE: CPT

## 2024-04-24 PROCEDURE — 81001 URINALYSIS AUTO W/SCOPE: CPT

## 2024-04-24 PROCEDURE — 82306 VITAMIN D 25 HYDROXY: CPT

## 2024-04-24 PROCEDURE — 82570 ASSAY OF URINE CREATININE: CPT

## 2024-04-24 PROCEDURE — 36415 COLL VENOUS BLD VENIPUNCTURE: CPT

## 2024-04-24 PROCEDURE — 80069 RENAL FUNCTION PANEL: CPT

## 2024-04-24 PROCEDURE — 83970 ASSAY OF PARATHORMONE: CPT

## 2024-05-20 ENCOUNTER — TELEPHONE (OUTPATIENT)
Age: 84
End: 2024-05-20

## 2024-07-12 ENCOUNTER — RA CDI HCC (OUTPATIENT)
Dept: OTHER | Facility: HOSPITAL | Age: 84
End: 2024-07-12

## 2024-07-19 ENCOUNTER — OFFICE VISIT (OUTPATIENT)
Dept: FAMILY MEDICINE CLINIC | Facility: CLINIC | Age: 84
End: 2024-07-19
Payer: MEDICARE

## 2024-07-19 ENCOUNTER — TELEPHONE (OUTPATIENT)
Dept: FAMILY MEDICINE CLINIC | Facility: CLINIC | Age: 84
End: 2024-07-19

## 2024-07-19 VITALS
TEMPERATURE: 97.6 F | OXYGEN SATURATION: 98 % | BODY MASS INDEX: 33.91 KG/M2 | DIASTOLIC BLOOD PRESSURE: 78 MMHG | RESPIRATION RATE: 16 BRPM | SYSTOLIC BLOOD PRESSURE: 128 MMHG | HEART RATE: 78 BPM | HEIGHT: 59 IN | WEIGHT: 168.2 LBS

## 2024-07-19 DIAGNOSIS — N18.32 STAGE 3B CHRONIC KIDNEY DISEASE (HCC): ICD-10-CM

## 2024-07-19 DIAGNOSIS — E03.9 ACQUIRED HYPOTHYROIDISM: ICD-10-CM

## 2024-07-19 DIAGNOSIS — Z00.00 MEDICARE ANNUAL WELLNESS VISIT, SUBSEQUENT: Primary | ICD-10-CM

## 2024-07-19 DIAGNOSIS — E78.01 FAMILIAL HYPERCHOLESTEROLEMIA: ICD-10-CM

## 2024-07-19 DIAGNOSIS — M54.16 LUMBAR RADICULOPATHY: ICD-10-CM

## 2024-07-19 PROCEDURE — G0438 PPPS, INITIAL VISIT: HCPCS | Performed by: FAMILY MEDICINE

## 2024-07-19 NOTE — ASSESSMENT & PLAN NOTE
Patient's MRI reviewed most recent.  She does have moderate to severe spinal stenosis.  I personally feel that neurosurgical back or orthopedic back consultation may be necessary at this time pending pain management follow-up.

## 2024-07-19 NOTE — PROGRESS NOTES
"Ambulatory Visit  Name: Yennifer Vanegas      : 1940      MRN: [id number]  Encounter Provider: Yennifer Cuevas DO  Encounter Date: 2024   Encounter department: [facility] PRIMARY CARE    Assessment & Plan   1. Medicare annual wellness visit, subsequent  2. Lumbar radiculopathy-with spinal stenosis  Assessment & Plan:  Patient's MRI reviewed most recent.  She does have moderate to severe spinal stenosis.  I personally feel that neurosurgical back or orthopedic back consultation may be necessary at this time pending pain management follow-up.  3. Acquired hypothyroidism  -     TSH, 3rd generation; Future; Expected date: 2025  -     Comprehensive metabolic panel; Future; Expected date: 2025  -     Lipid panel; Future; Expected date: 2025  4. Familial hypercholesterolemia  -     Lipid panel; Future; Expected date: 2025  5. Stage 3b chronic kidney disease (HCC)  -     Comprehensive metabolic panel; Future; Expected date: 2025      Depression Screening and Follow-up Plan: Patient was screened for depression during today's encounter. They screened negative with a PHQ-2 score of 0.    Urinary Incontinence Plan of Care: counseling topics discussed: practice Kegel (pelvic floor strengthening) exercises, limiting fluid intake 3-4 hours before bed and wearing BESSIE stockings for edema control.       Preventive health issues were discussed with patient, and age appropriate screening tests were ordered as noted in patient's After Visit Summary. Personalized health advice and appropriate referrals for health education or preventive services given if needed, as noted in patient's After Visit Summary.    History of Present Illness     Patient is an 83-year-old female here for Medicare wellness    Patient recently saw pain management.  Scheduled for recheck in September.  Epidurals have been slightly helpful.  Pain management recommended for patient to see a \"neurologist who " "specializes in spine \"I am not exactly sure what that means.  I stated I think maybe she meant a neurosurgeon?  Patient had seen neurosurgery in the past.  Patient's labs are updated from April.  Chief Complaint   Patient presents with   • Medicare Wellness Visit      Patient Care Team:  Yennifer Cuevas DO as PCP - General  MD Gavi Mercer DO Stephanie Eckert,     Review of Systems   HENT:  Positive for postnasal drip.    Respiratory:  Negative for shortness of breath and wheezing.    Genitourinary:         Trickle   Musculoskeletal:  Positive for arthralgias and myalgias.   Neurological:         Balance   Psychiatric/Behavioral:  Negative for sleep disturbance.         Annual Wellness Visit Questionnaire   Yennifer is here for her Subsequent Wellness visit.     Health Risk Assessment:   Patient rates overall health as good. Patient feels that their physical health rating is slightly worse. Patient is satisfied with their life. Eyesight was rated as slightly worse. Hearing was rated as same. Patient feels that their emotional and mental health rating is same. Patients states they are never, rarely angry. Patient states they are sometimes unusually tired/fatigued. Pain experienced in the last 7 days has been a lot. Patient's pain rating has been 6/10. Patient states that she has experienced no weight loss or gain in last 6 months.     Depression Screening:   PHQ-2 Score: 0      Fall Risk Screening:   In the past year, patient has experienced: history of falling in past year    Number of falls: 1  Injured during fall?: No    Feels unsteady when standing or walking?: Yes    Worried about falling?: Yes      Urinary Incontinence Screening:   Patient has leaked urine accidently in the last six months.     Home Safety:  Patient has trouble with stairs inside or outside of their home. Patient has working smoke alarms and has working carbon monoxide detector. Home safety hazards include: none. "     Nutrition:   Current diet is Regular.     Medications:   Patient is currently taking over-the-counter supplements. OTC medications include: see medication list. Patient is able to manage medications.     Activities of Daily Living (ADLs)/Instrumental Activities of Daily Living (IADLs):   Walk and transfer into and out of bed and chair?: Yes  Dress and groom yourself?: Yes    Bathe or shower yourself?: Yes    Feed yourself? Yes  Do your laundry/housekeeping?: Yes  Manage your money, pay your bills and track your expenses?: Yes  Make your own meals?: Yes    Do your own shopping?: Yes    Previous Hospitalizations:   Any hospitalizations or ED visits within the last 12 months?: No      Advance Care Planning:   Living will: Yes    Durable POA for healthcare: Yes    Advanced directive: Yes    End of Life Decisions reviewed with patient: Yes    Provider agrees with end of life decisions: Yes      Cognitive Screening:   Provider or family/friend/caregiver concerned regarding cognition?: No    PREVENTIVE SCREENINGS      Cardiovascular Screening:    General: History Lipid Disorder      Diabetes Screening:     General: Screening Current      Colorectal Cancer Screening:     General: Screening Not Indicated      Breast Cancer Screening:     General: Screening Current      Cervical Cancer Screening:    General: Screening Not Indicated      Osteoporosis Screening:    General: Screening Current      Abdominal Aortic Aneurysm (AAA) Screening:        General: Screening Not Indicated      Lung Cancer Screening:     General: Screening Not Indicated      Hepatitis C Screening:      Hep C Screening Accepted: No     Screening, Brief Intervention, and Referral to Treatment (SBIRT)    Screening  Typical number of drinks in a day: 0  Typical number of drinks in a week: 0  Interpretation: Low risk drinking behavior.    AUDIT-C Screenin) How often did you have a drink containing alcohol in the past year? monthly or less  2) How many  "drinks did you have on a typical day when you were drinking in the past year? 0  3) How often did you have 6 or more drinks on one occasion in the past year? never    AUDIT-C Score: 1  Interpretation: Score 0-2 (female): Negative screen for alcohol misuse    Single Item Drug Screening:  How often have you used an illegal drug (including marijuana) or a prescription medication for non-medical reasons in the past year? never    Single Item Drug Screen Score: 0  Interpretation: Negative screen for possible drug use disorder    Social Determinants of Health     Financial Resource Strain: Low Risk  (7/2/2023)    Overall Financial Resource Strain (CARDIA)    • Difficulty of Paying Living Expenses: Not hard at all   Food Insecurity: No Food Insecurity (7/19/2024)    Hunger Vital Sign    • Worried About Running Out of Food in the Last Year: Never true    • Ran Out of Food in the Last Year: Never true   Transportation Needs: No Transportation Needs (7/19/2024)    PRAPARE - Transportation    • Lack of Transportation (Medical): No    • Lack of Transportation (Non-Medical): No   Housing Stability: Low Risk  (7/19/2024)    Housing Stability Vital Sign    • Unable to Pay for Housing in the Last Year: No    • Number of Times Moved in the Last Year: 0    • Homeless in the Last Year: No   Utilities: Not At Risk (7/19/2024)    [facility] Utilities    • Threatened with loss of utilities: No     No results found.    Objective   /78   Pulse 78   Temp 97.6 °F (36.4 °C) (Temporal)   Resp 16   Ht 4' 11\" (1.499 m)   Wt 76.3 kg (168 lb 3.2 oz)   SpO2 98%   BMI 33.97 kg/m²     Physical Exam  Vitals (83 year old female appears  younger than stated age) and nursing note reviewed.   Constitutional:       General: She is not in acute distress.     Appearance: Normal appearance. She is well-developed.   HENT:      Head: Normocephalic and atraumatic.      Right Ear: Tympanic membrane normal.      Left Ear: Tympanic membrane normal.      Nose: " Nose normal.      Mouth/Throat:      Mouth: Mucous membranes are moist.   Eyes:      Conjunctiva/sclera: Conjunctivae normal.   Cardiovascular:      Rate and Rhythm: Normal rate and regular rhythm.      Heart sounds: No murmur heard.  Pulmonary:      Effort: Pulmonary effort is normal. No respiratory distress.      Breath sounds: Normal breath sounds.   Abdominal:      Palpations: Abdomen is soft.      Tenderness: There is no abdominal tenderness.   Musculoskeletal:         General: No swelling.      Comments: antalgic   Skin:     Findings: No rash.   Neurological:      Mental Status: She is alert and oriented to person, place, and time.   Psychiatric:         Mood and Affect: Mood normal.         Behavior: Behavior normal.         Thought Content: Thought content normal.         Judgment: Judgment normal.

## 2024-07-19 NOTE — PATIENT INSTRUCTIONS

## 2024-09-27 DIAGNOSIS — E03.9 ADULT HYPOTHYROIDISM: ICD-10-CM

## 2024-09-27 DIAGNOSIS — E78.49 OTHER HYPERLIPIDEMIA: ICD-10-CM

## 2024-09-28 RX ORDER — LEVOTHYROXINE SODIUM 50 UG/1
50 TABLET ORAL DAILY
Qty: 90 TABLET | Refills: 3 | Status: SHIPPED | OUTPATIENT
Start: 2024-09-28

## 2024-09-28 RX ORDER — PRAVASTATIN SODIUM 20 MG
20 TABLET ORAL EVERY OTHER DAY
Qty: 45 TABLET | Refills: 2 | Status: SHIPPED | OUTPATIENT
Start: 2024-09-28

## 2024-11-30 ENCOUNTER — OFFICE VISIT (OUTPATIENT)
Dept: URGENT CARE | Age: 84
End: 2024-11-30
Payer: MEDICARE

## 2024-11-30 ENCOUNTER — APPOINTMENT (OUTPATIENT)
Dept: RADIOLOGY | Age: 84
End: 2024-11-30
Payer: MEDICARE

## 2024-11-30 VITALS
DIASTOLIC BLOOD PRESSURE: 86 MMHG | RESPIRATION RATE: 16 BRPM | HEART RATE: 64 BPM | SYSTOLIC BLOOD PRESSURE: 142 MMHG | TEMPERATURE: 98.1 F | OXYGEN SATURATION: 96 %

## 2024-11-30 DIAGNOSIS — J22 LOWER RESPIRATORY TRACT INFECTIOUS DISEASE: ICD-10-CM

## 2024-11-30 DIAGNOSIS — R05.1 ACUTE COUGH: Primary | ICD-10-CM

## 2024-11-30 PROCEDURE — G0463 HOSPITAL OUTPT CLINIC VISIT: HCPCS | Performed by: EMERGENCY MEDICINE

## 2024-11-30 PROCEDURE — 99213 OFFICE O/P EST LOW 20 MIN: CPT | Performed by: EMERGENCY MEDICINE

## 2024-11-30 PROCEDURE — 71046 X-RAY EXAM CHEST 2 VIEWS: CPT

## 2024-11-30 RX ORDER — ALBUTEROL SULFATE 90 UG/1
2 INHALANT RESPIRATORY (INHALATION) EVERY 4 HOURS PRN
Qty: 8.5 G | Refills: 5 | Status: SHIPPED | OUTPATIENT
Start: 2024-11-30 | End: 2024-12-30

## 2024-11-30 RX ORDER — BENZONATATE 200 MG/1
200 CAPSULE ORAL 3 TIMES DAILY PRN
Qty: 20 CAPSULE | Refills: 0 | Status: SHIPPED | OUTPATIENT
Start: 2024-11-30

## 2024-11-30 RX ORDER — DOXYCYCLINE 100 MG/1
100 TABLET ORAL 2 TIMES DAILY
Qty: 14 TABLET | Refills: 0 | Status: SHIPPED | OUTPATIENT
Start: 2024-11-30 | End: 2024-12-07

## 2024-11-30 NOTE — PROGRESS NOTES
[facility]        NAME: Yennifer Vanegas is a 84 y.o. female  : 1940    MRN: [id number]  DATE: 2024  TIME: 1:31 PM    Assessment and Plan   Acute cough [R05.1]  1. Acute cough  XR chest pa and lateral    benzonatate (TESSALON) 200 MG capsule    albuterol (ProAir HFA) 90 mcg/act inhaler      2. Lower respiratory tract infectious disease  doxycycline (ADOXA) 100 MG tablet    benzonatate (TESSALON) 200 MG capsule    albuterol (ProAir HFA) 90 mcg/act inhaler        On personal review of chest x-ray, no definitive consolidation or infiltrate noted, however trachea does appear slightly deviated to the right, perhaps secondary to mucous plugging or mass.  Patient otherwise hemodynamically stable in clinic, tolerating her secretions without evidence of superior vena cava syndrome or respiratory distress.  On further review of systems, patient does endorse some associated night sweats over the past 1 week as well as difficulty swallowing which began recently per patient.  I did advise patient to follow-up closely with her PCP regarding reevaluation in the office to ensure clinical improvement as well as possible further imaging to include repeat CT chest.  Will treat in the interim for bronchitis with doxycycline and Tessalon Perles, as well as albuterol MDI.  Advised patient that she develops any worsening symptoms including but not limited to difficulty breathing, inability to swallow or tolerate oral secretions or otherwise worsening symptoms to seek care in emergency room.  All questions were answered at bedside, patient was amenable to plan and voiced understanding.    Patient Instructions       Follow up with PCP in 3-5 days.  Proceed to  ER if symptoms worsen.    If tests have been performed at [facility], our office will contact you with results if changes need to be made to the care plan discussed with you at the visit.  You can review your full results on [facility].    Chief  Complaint   No chief complaint on file.        History of Present Illness       84-year-old female with history of asthma, CKD 3B, hypothyroidism, meningioma, hyperlipidemia presents with a chief complaint of cough productive of greenish sputum with associated nasal congestion, generalized bodyaches and fatigue and malaise which began approximately 5 to 7 days ago.  She denies chest pain, shortness of breath, abdominal pain, nausea vomiting or diarrhea.    Cough  This is a new problem. The current episode started in the past 7 days. The problem has been gradually worsening. The problem occurs constantly. The cough is Productive of sputum. Associated symptoms include chills, ear congestion, ear pain, headaches, myalgias, nasal congestion, postnasal drip, rhinorrhea, a sore throat and sweats. Pertinent negatives include no chest pain, fever, heartburn, hemoptysis, rash, shortness of breath, weight loss or wheezing. The symptoms are aggravated by lying down.       Review of Systems   Review of Systems   Constitutional:  Positive for chills. Negative for fever and weight loss.   HENT:  Positive for ear pain, postnasal drip, rhinorrhea and sore throat.    Respiratory:  Positive for cough. Negative for hemoptysis, shortness of breath and wheezing.    Cardiovascular:  Negative for chest pain.   Gastrointestinal:  Negative for heartburn.   Musculoskeletal:  Positive for myalgias.   Skin:  Negative for rash.   Neurological:  Positive for headaches.         Current Medications       Current Outpatient Medications:     acetaminophen (TYLENOL) 650 mg CR tablet, Take 650 mg by mouth every 8 (eight) hours as needed for mild pain, Disp: , Rfl:     albuterol (ProAir HFA) 90 mcg/act inhaler, Inhale 2 puffs every 4 (four) hours as needed for wheezing or shortness of breath (cough), Disp: 8.5 g, Rfl: 5    albuterol (PROVENTIL HFA,VENTOLIN HFA) 90 mcg/act inhaler, Inhale 2 puffs every 6 (six) hours as needed for wheezing, Disp: 8.5 g,  Rfl: 1    benzonatate (TESSALON) 200 MG capsule, Take 1 capsule (200 mg total) by mouth 3 (three) times a day as needed for cough, Disp: 20 capsule, Rfl: 0    Cholecalciferol (VITAMIN D) 2000 units CAPS, Take 1 tablet by mouth daily, Disp: , Rfl:     co-enzyme Q-10 30 MG capsule, Take 30 mg by mouth daily, Disp: , Rfl:     cyanocobalamin (VITAMIN B-12) 1000 MCG tablet, Take 1 tablet by mouth in the morning, Disp: , Rfl:     doxycycline (ADOXA) 100 MG tablet, Take 1 tablet (100 mg total) by mouth 2 (two) times a day for 7 days, Disp: 14 tablet, Rfl: 0    furosemide (LASIX) 20 mg tablet, Take 20 mg by mouth daily, Disp: , Rfl:     levothyroxine 50 mcg tablet, TAKE 1 TABLET (50 MCG TOTAL) BY MOUTH DAILY, Disp: 90 tablet, Rfl: 3    Lumigan 0.01 % ophthalmic drops, INSTILL ONE DROP IN BOTH EYES EVERY NIGHT AT BEDTIME, Disp: 5 mL, Rfl: 4    pravastatin (PRAVACHOL) 20 mg tablet, TAKE 1 TABLET (20 MG TOTAL) BY MOUTH EVERY OTHER DAY, Disp: 45 tablet, Rfl: 2    Multiple Vitamins-Minerals (EMERGEN-C IMMUNE PO), Vitamin C  daily (Patient not taking: Reported on 11/30/2024), Disp: , Rfl:     Current Allergies     Allergies as of 11/30/2024 - Reviewed 11/30/2024   Allergen Reaction Noted    Molds & smuts Shortness Of Breath 07/09/2019    Penicillins Hives, Rash, and Edema 04/08/2014            The following portions of the patient's history were reviewed and updated as appropriate: allergies, current medications, past family history, past medical history, past social history, past surgical history and problem list.     Past Medical History:   Diagnosis Date    Arthritis     Colon polyps     Disease of thyroid gland     Hyperlipidemia     Migraine     Pure hypercholesterolemia     Thyroid disease        Past Surgical History:   Procedure Laterality Date    BREAST BIOPSY Right     benign many yrs ago    DILATION AND CURETTAGE, DIAGNOSTIC / THERAPEUTIC      MAMMO STEREOTACTIC BREAST BIOPSY RIGHT (ALL INC) Right     yrs ago-benign     TONSILLECTOMY      TUBAL LIGATION         Family History   Problem Relation Age of Onset    Pancreatic cancer Mother 70        susceptibility     Kidney disease Father     Pancreatic cancer Maternal Aunt         over 50    Pancreatic cancer Paternal Aunt         over 50    No Known Problems Maternal Grandmother     No Known Problems Paternal Grandmother     No Known Problems Maternal Aunt     No Known Problems Maternal Aunt     No Known Problems Maternal Aunt     No Known Problems Paternal Aunt          Medications have been verified.        Objective   /86   Pulse 64   Temp 98.1 °F (36.7 °C)   Resp 16   SpO2 96%   No LMP recorded. Patient is postmenopausal.       Physical Exam     Physical Exam                 Examination of the left-middle field reveals rhonchi. Examination of the right-lower field reveals rhonchi. Examination of the left-lower field reveals rhonchi. Rhonchi present. No wheezing or rales.   Chest:      Chest wall: No tenderness.   Abdominal:      General: There is no distension.      Palpations: There is no mass.      Tenderness: There is no abdominal tenderness. There is no right CVA tenderness, left CVA tenderness, guarding or rebound.      Hernia: No hernia is present.   Musculoskeletal:         General: No swelling, tenderness, deformity or signs of injury.      Cervical back: Normal range of motion and neck supple. No rigidity or tenderness.      Right lower leg: No edema.      Left lower leg: No edema.   Lymphadenopathy:      Cervical: No cervical adenopathy.   Skin:     Coloration: Skin is not jaundiced or pale.      Findings: No bruising, erythema, lesion or rash.   Neurological:      General: No focal deficit present.      Mental Status: She is alert and oriented to person, place, and time.      Cranial Nerves: No cranial nerve deficit.      Sensory: No sensory deficit.      Motor: No weakness.      Coordination: Coordination normal.      Gait: Gait normal.   Psychiatric:         Mood and Affect: Mood normal.         Behavior: Behavior normal.

## 2024-12-04 ENCOUNTER — TELEPHONE (OUTPATIENT)
Age: 84
End: 2024-12-04

## 2024-12-04 NOTE — TELEPHONE ENCOUNTER
Spoke with patient she says she is feeling a little better, no fever, her sinuses are still clogged and she has a little chest congestion. Patient has 5 days left of the antibiotic ,she is taking robitussin and cough drops. She was told by the  doctor that she needs to follow up with some kind of testing from what they saw on her chest xray. Please see results and notes in chart.

## 2024-12-04 NOTE — TELEPHONE ENCOUNTER
Patient went to [facility]  on Saturday. Dx. URI. Doctor told patient  to ask for U/S as he saw something in her chest. Still not feeling better. Please call.

## 2024-12-05 NOTE — TELEPHONE ENCOUNTER
I spoke with the radiology reading room and they said I should send this message to Dr Chavez to re look at the xray and see if they need to make a comment on the shape and position of the trachea. See messages below

## 2024-12-11 ENCOUNTER — OFFICE VISIT (OUTPATIENT)
Dept: FAMILY MEDICINE CLINIC | Facility: CLINIC | Age: 84
End: 2024-12-11
Payer: MEDICARE

## 2024-12-11 VITALS
WEIGHT: 167 LBS | BODY MASS INDEX: 33.67 KG/M2 | TEMPERATURE: 97.2 F | HEART RATE: 87 BPM | OXYGEN SATURATION: 98 % | HEIGHT: 59 IN | SYSTOLIC BLOOD PRESSURE: 132 MMHG | DIASTOLIC BLOOD PRESSURE: 78 MMHG

## 2024-12-11 DIAGNOSIS — R09.82 POST-NASAL DRIP: ICD-10-CM

## 2024-12-11 DIAGNOSIS — R05.1 ACUTE COUGH: ICD-10-CM

## 2024-12-11 DIAGNOSIS — R07.89 CHEST TIGHTNESS: Primary | ICD-10-CM

## 2024-12-11 PROCEDURE — 99214 OFFICE O/P EST MOD 30 MIN: CPT | Performed by: NURSE PRACTITIONER

## 2024-12-11 PROCEDURE — G2211 COMPLEX E/M VISIT ADD ON: HCPCS | Performed by: NURSE PRACTITIONER

## 2024-12-11 RX ORDER — FLUTICASONE PROPIONATE 50 MCG
1 SPRAY, SUSPENSION (ML) NASAL DAILY
Qty: 16 G | Refills: 0 | Status: SHIPPED | OUTPATIENT
Start: 2024-12-11

## 2024-12-11 RX ORDER — PREDNISONE 20 MG/1
40 TABLET ORAL DAILY
Qty: 6 TABLET | Refills: 0 | Status: SHIPPED | OUTPATIENT
Start: 2024-12-11 | End: 2024-12-14

## 2024-12-11 NOTE — PROGRESS NOTES
Name: Yennifer Vanegas      : 1940      MRN: [id number]  Encounter Provider: CATARINA Martin  Encounter Date: 2024   Encounter department: [facility] PRIMARY CARE  :  Assessment & Plan  Chest tightness  Start inhaler- albuterol as needed.   Start Flonase, this is an intranasal corticosteroid. This is 1-2 sprays each nostril once daily. Please be aware that this can cause nasal mucosa irritation. Stop using if you experience any nose bleeds. This can be used for allergy symptoms as well as ear discomfort/pressure.   Stay well hydrated.   Prednisone sent that can be used if no improvement in next 24-48 hours.   If no improvement then recommend repeat lung imaging.   Please call the office if you are experiencing any worsening of symptoms or no symptom improvement.    Ambulating pulse ox normal- 98% RA at rest 97% RA ambulation-able to talk and walk- no sob/ cp   ER for any worsening symptoms    Orders:  •  predniSONE 20 mg tablet; Take 2 tablets (40 mg total) by mouth daily for 3 days    Post-nasal drip  Same as above   Orders:  •  fluticasone (FLONASE) 50 mcg/act nasal spray; 1 spray into each nostril daily    Acute cough  Same as above   Orders:  •  predniSONE 20 mg tablet; Take 2 tablets (40 mg total) by mouth daily for 3 days           History of Present Illness     Patient presents with:  Cold Like Symptoms: Started the Wednesday before thanksgiving was seen at  completed abx and was using Robitussin.   At urgent care  x ray done, doxycycline ordered and tessalon/ albuterol.   Still having chest tightness and coughing   Chest tightness/harder to breath.   Chest uncomfortable to lay on stomach. No fevers/chills. No chest pain with walking. No sob with talking/eating.   COVID testing negative done. Hasn't used the inhaler.       Review of Systems   Constitutional:  Positive for fatigue. Negative for chills and fever.   HENT:  Positive for congestion, postnasal drip and  "rhinorrhea.    Eyes:  Negative for discharge.   Respiratory:  Positive for cough and shortness of breath.    Cardiovascular:  Negative for chest pain.   Gastrointestinal:  Negative for constipation and diarrhea.   Genitourinary:  Negative for difficulty urinating.   Musculoskeletal:  Negative for joint swelling.   Skin:  Negative for rash.   Neurological:  Negative for headaches.   Hematological:  Negative for adenopathy.   Psychiatric/Behavioral:  The patient is not nervous/anxious.        Objective   /78   Pulse 87   Temp (!) 97.2 °F (36.2 °C) (Temporal)   Ht 4' 11\" (1.499 m)   Wt 75.8 kg (167 lb)   SpO2 98%   BMI 33.73 kg/m²      Physical Exam  Vitals and nursing note reviewed.   Constitutional:       General: She is not in acute distress.     Appearance: Normal appearance. She is well-developed. She is not diaphoretic.   HENT:      Head: Normocephalic and atraumatic.      Right Ear: External ear normal.      Left Ear: External ear normal.      Nose: Congestion and rhinorrhea present.   Eyes:      General: Lids are normal.         Right eye: No discharge.         Left eye: No discharge.      Conjunctiva/sclera: Conjunctivae normal.   Cardiovascular:      Rate and Rhythm: Normal rate and regular rhythm.      Heart sounds: No murmur heard.  Pulmonary:      Effort: Pulmonary effort is normal. No respiratory distress.      Breath sounds: Normal breath sounds. No wheezing.   Musculoskeletal:         General: No deformity.   Skin:     General: Skin is warm and dry.   Neurological:      General: No focal deficit present.      Mental Status: She is alert and oriented to person, place, and time.   Psychiatric:         Speech: Speech normal.         Behavior: Behavior normal.         Thought Content: Thought content normal.         Judgment: Judgment normal.         "

## 2025-02-05 DIAGNOSIS — R09.82 POST-NASAL DRIP: ICD-10-CM

## 2025-02-06 RX ORDER — FLUTICASONE PROPIONATE 50 MCG
SPRAY, SUSPENSION (ML) NASAL
Qty: 16 G | Refills: 2 | Status: SHIPPED | OUTPATIENT
Start: 2025-02-06

## 2025-02-24 ENCOUNTER — LAB (OUTPATIENT)
Dept: LAB | Facility: CLINIC | Age: 85
End: 2025-02-24
Payer: MEDICARE

## 2025-02-24 DIAGNOSIS — E78.01 FAMILIAL HYPERCHOLESTEROLEMIA: ICD-10-CM

## 2025-02-24 DIAGNOSIS — E03.9 ACQUIRED HYPOTHYROIDISM: ICD-10-CM

## 2025-02-24 DIAGNOSIS — N18.32 STAGE 3B CHRONIC KIDNEY DISEASE (HCC): ICD-10-CM

## 2025-02-24 LAB
ALBUMIN SERPL BCG-MCNC: 4.3 G/DL (ref 3.5–5)
ALP SERPL-CCNC: 66 U/L (ref 34–104)
ALT SERPL W P-5'-P-CCNC: 24 U/L (ref 7–52)
ANION GAP SERPL CALCULATED.3IONS-SCNC: 8 MMOL/L (ref 4–13)
AST SERPL W P-5'-P-CCNC: 19 U/L (ref 13–39)
BILIRUB SERPL-MCNC: 0.53 MG/DL (ref 0.2–1)
BUN SERPL-MCNC: 13 MG/DL (ref 5–25)
CALCIUM SERPL-MCNC: 9.8 MG/DL (ref 8.4–10.2)
CHLORIDE SERPL-SCNC: 98 MMOL/L (ref 96–108)
CHOLEST SERPL-MCNC: 229 MG/DL (ref ?–200)
CO2 SERPL-SCNC: 30 MMOL/L (ref 21–32)
CREAT SERPL-MCNC: 0.74 MG/DL (ref 0.6–1.3)
GFR SERPL CREATININE-BSD FRML MDRD: 74 ML/MIN/1.73SQ M
GLUCOSE P FAST SERPL-MCNC: 82 MG/DL (ref 65–99)
HDLC SERPL-MCNC: 74 MG/DL
LDLC SERPL CALC-MCNC: 137 MG/DL (ref 0–100)
NONHDLC SERPL-MCNC: 155 MG/DL
POTASSIUM SERPL-SCNC: 4.2 MMOL/L (ref 3.5–5.3)
PROT SERPL-MCNC: 7.3 G/DL (ref 6.4–8.4)
SODIUM SERPL-SCNC: 136 MMOL/L (ref 135–147)
TRIGL SERPL-MCNC: 91 MG/DL (ref ?–150)
TSH SERPL DL<=0.05 MIU/L-ACNC: 2.06 UIU/ML (ref 0.45–4.5)

## 2025-02-24 PROCEDURE — 84443 ASSAY THYROID STIM HORMONE: CPT

## 2025-02-24 PROCEDURE — 36415 COLL VENOUS BLD VENIPUNCTURE: CPT

## 2025-02-24 PROCEDURE — 80061 LIPID PANEL: CPT

## 2025-02-24 PROCEDURE — 80053 COMPREHEN METABOLIC PANEL: CPT

## 2025-03-04 ENCOUNTER — OFFICE VISIT (OUTPATIENT)
Dept: FAMILY MEDICINE CLINIC | Facility: CLINIC | Age: 85
End: 2025-03-04
Payer: MEDICARE

## 2025-03-04 VITALS
WEIGHT: 168 LBS | SYSTOLIC BLOOD PRESSURE: 140 MMHG | TEMPERATURE: 96 F | HEART RATE: 95 BPM | HEIGHT: 59 IN | RESPIRATION RATE: 16 BRPM | DIASTOLIC BLOOD PRESSURE: 80 MMHG | BODY MASS INDEX: 33.87 KG/M2 | OXYGEN SATURATION: 98 %

## 2025-03-04 DIAGNOSIS — R19.4 BOWEL HABIT CHANGES: ICD-10-CM

## 2025-03-04 DIAGNOSIS — E03.9 ACQUIRED HYPOTHYROIDISM: ICD-10-CM

## 2025-03-04 DIAGNOSIS — E78.01 FAMILIAL HYPERCHOLESTEROLEMIA: ICD-10-CM

## 2025-03-04 DIAGNOSIS — D32.9 MENINGIOMA (HCC): ICD-10-CM

## 2025-03-04 DIAGNOSIS — E55.9 VITAMIN D DEFICIENCY: ICD-10-CM

## 2025-03-04 DIAGNOSIS — G60.9 IDIOPATHIC PERIPHERAL NEUROPATHY: Primary | ICD-10-CM

## 2025-03-04 DIAGNOSIS — M48.062 SPINAL STENOSIS OF LUMBAR REGION WITH NEUROGENIC CLAUDICATION: ICD-10-CM

## 2025-03-04 DIAGNOSIS — H93.13 TINNITUS OF BOTH EARS: ICD-10-CM

## 2025-03-04 DIAGNOSIS — N18.32 STAGE 3B CHRONIC KIDNEY DISEASE (HCC): ICD-10-CM

## 2025-03-04 PROCEDURE — 99214 OFFICE O/P EST MOD 30 MIN: CPT | Performed by: NURSE PRACTITIONER

## 2025-03-04 PROCEDURE — G2211 COMPLEX E/M VISIT ADD ON: HCPCS | Performed by: NURSE PRACTITIONER

## 2025-03-04 NOTE — ASSESSMENT & PLAN NOTE
Lab Results   Component Value Date    EGFR 74 02/24/2025    EGFR 65 04/24/2024    EGFR 67 03/04/2024    CREATININE 0.74 02/24/2025    CREATININE 0.82 04/24/2024    CREATININE 0.80 03/04/2024   Follows with nephrology for management. Stable

## 2025-03-04 NOTE — PATIENT INSTRUCTIONS
Return in 6 months for wellness exam and follow up.     Labs due in 6 months.     Follow up with neurology and ENT.     Please call the office if you are experiencing any worsening of symptoms or no symptom improvement.

## 2025-03-04 NOTE — PROGRESS NOTES
Name: Yennifer Vanegas      : 1940      MRN: [id number]  Encounter Provider: CATARINA Martin  Encounter Date: 3/4/2025   Encounter department: [facility]  :  Assessment & Plan  Idiopathic peripheral neuropathy  Failed gabapentin in the past. Worsening. Would like to see neuro-referral placed. Check B12 as well  Orders:  •  Ambulatory Referral to Neurology; Future  •  Vitamin B12; Future    Tinnitus of both ears  Increasing-follow up with ENT.   Orders:  •  Ambulatory Referral to Otolaryngology; Future    Familial hypercholesterolemia  Labs due in 6 months.   Lab Results   Component Value Date    LDLCALC 137 (H) 2025   On statin.   Wants to complete coronary CT- discussed how to interpret findings since on statin- patient understand and wants to get it done  Orders:  •  Lipid panel; Future  •  CBC and differential; Future  •  Comprehensive metabolic panel; Future  •  CT coronary calcium score; Future    Vitamin D deficiency  Labs ordered for next time  Orders:  •  Vitamin D 25 hydroxy; Future    Acquired hypothyroidism  Labs ordered. Stable   Orders:  •  TSH, 3rd generation; Future  •  T4, free; Future    Meningioma (HCC)  Neuro was monitoring. Diagnosis in . Repeat imaging after that stable.        Stage 3b chronic kidney disease (HCC)  Lab Results   Component Value Date    EGFR 74 2025    EGFR 65 2024    EGFR 67 2024    CREATININE 0.74 2025    CREATININE 0.82 2024    CREATININE 0.80 2024   Follows with nephrology for management. Stable        Bowel habit changes  Not consistent- recommend keeping list of patterns and following up with GI.        Spinal stenosis of lumbar region with neurogenic claudication  Follow up with pain management              History of Present Illness    patient presents to the office today for routine 6-month follow-up.  Patient currently on pravastatin and levothyroxine managed by primary care.   "Patient's labs last done February 24.  Cholesterol did go up from 178 to now 229.  LDL increased from 107 to now 137 metabolic panel and thyroid were stable. Cholesterol med changed to every other day. It did help the cramping she was having.     Neuropathy has been getting worse. Had spinal shot 2/18 and it didn't help. Previously it did help a lot. It's causing balance issues due to the neuropathy. Follows [facility] management at [facility]. They did recommend following with neurology.     Inconsistent stool- sometimes constipation/ sometimes diarrhea. Not black/tarry. No blood. States it's better lately. Has been going for years.     Review of Systems   Constitutional:  Negative for chills and fever.   Eyes:  Negative for discharge.   Respiratory:  Negative for shortness of breath.    Cardiovascular:  Negative for chest pain.   Gastrointestinal:  Negative for constipation and diarrhea.   Genitourinary:  Negative for difficulty urinating.   Musculoskeletal:  Positive for back pain. Negative for joint swelling.   Skin:  Negative for rash.   Neurological:  Negative for headaches.   Hematological:  Negative for adenopathy.   Psychiatric/Behavioral:  The patient is not nervous/anxious.        Objective   /80   Pulse 95   Temp (!) 96 °F (35.6 °C) (Temporal)   Resp 16   Ht 4' 11\" (1.499 m)   Wt 76.2 kg (168 lb)   SpO2 98%   BMI 33.93 kg/m²      Physical Exam  Vitals and nursing note reviewed.   Constitutional:       General: She is not in acute distress.     Appearance: Normal appearance. She is well-developed. She is obese. She is not diaphoretic.   HENT:      Head: Normocephalic and atraumatic.      Right Ear: External ear normal.      Left Ear: External ear normal.   Eyes:      General: Lids are normal.         Right eye: No discharge.         Left eye: No discharge.      Conjunctiva/sclera: Conjunctivae normal.   Pulmonary:      Effort: Pulmonary effort is normal. No respiratory distress.   Musculoskeletal:   "       General: No deformity.   Skin:     General: Skin is warm and dry.   Neurological:      General: No focal deficit present.      Mental Status: She is alert and oriented to person, place, and time.   Psychiatric:         Speech: Speech normal.         Behavior: Behavior normal.         Thought Content: Thought content normal.         Judgment: Judgment normal.

## 2025-03-04 NOTE — ASSESSMENT & PLAN NOTE
Labs due in 6 months.   Lab Results   Component Value Date    LDLCALC 137 (H) 02/24/2025   On statin.   Wants to complete coronary CT- discussed how to interpret findings since on statin- patient understand and wants to get it done  Orders:  •  Lipid panel; Future  •  CBC and differential; Future  •  Comprehensive metabolic panel; Future  •  CT coronary calcium score; Future

## 2025-03-04 NOTE — ASSESSMENT & PLAN NOTE
Failed gabapentin in the past. Worsening. Would like to see neuro-referral placed. Check B12 as well  Orders:  •  Ambulatory Referral to Neurology; Future  •  Vitamin B12; Future

## 2025-03-26 ENCOUNTER — HOSPITAL ENCOUNTER (OUTPATIENT)
Dept: CT IMAGING | Facility: HOSPITAL | Age: 85
Discharge: HOME | End: 2025-03-26
Payer: COMMERCIAL

## 2025-03-26 DIAGNOSIS — E78.01 FAMILIAL HYPERCHOLESTEROLEMIA: ICD-10-CM

## 2025-03-26 PROCEDURE — 75571 CT HRT W/O DYE W/CA TEST: CPT

## 2025-04-02 ENCOUNTER — RESULTS FOLLOW-UP (OUTPATIENT)
Dept: FAMILY MEDICINE CLINIC | Facility: CLINIC | Age: 85
End: 2025-04-02

## 2025-04-02 ENCOUNTER — CONSULT (OUTPATIENT)
Dept: NEUROLOGY | Facility: CLINIC | Age: 85
End: 2025-04-02
Payer: MEDICARE

## 2025-04-02 VITALS
SYSTOLIC BLOOD PRESSURE: 120 MMHG | TEMPERATURE: 97.3 F | OXYGEN SATURATION: 99 % | WEIGHT: 165.2 LBS | HEART RATE: 71 BPM | BODY MASS INDEX: 33.37 KG/M2 | DIASTOLIC BLOOD PRESSURE: 76 MMHG

## 2025-04-02 DIAGNOSIS — R26.89 BALANCE PROBLEM: Primary | ICD-10-CM

## 2025-04-02 DIAGNOSIS — Z13.1 ENCOUNTER FOR SCREENING FOR DIABETES MELLITUS: ICD-10-CM

## 2025-04-02 DIAGNOSIS — G60.9 IDIOPATHIC PERIPHERAL NEUROPATHY: ICD-10-CM

## 2025-04-02 PROCEDURE — 99204 OFFICE O/P NEW MOD 45 MIN: CPT | Performed by: PSYCHIATRY & NEUROLOGY

## 2025-04-02 PROCEDURE — G2211 COMPLEX E/M VISIT ADD ON: HCPCS | Performed by: PSYCHIATRY & NEUROLOGY

## 2025-04-02 NOTE — PROGRESS NOTES
Name: Yennifer Vanegas      : 1940      MRN: [id number]  Encounter Provider: Willow Montoya MD  Encounter Date: 2025   Encounter department: [facility] [place]  :  Assessment & Plan  Idiopathic peripheral neuropathy  Ms. Solomon is having difficulty with her balance.  She has a history of peripheral neuropathy with unclear etiology.  She had low amplitude sural sensory nerve action potentials by EMG several years ago.  Interestingly, on exam, large fiber sensation is actually intact other than Romberg abnormal.  Vibration sense and position sense are both intact.  She does have some small fiber sensory loss.    Difficulty with balance and numbness in the feet likely related to underlying neuropathy as well as lumbar spinal stenosis.    Previous workup has not revealed an etiology for the neuropathy.  She did have low normal B12 level in 2019 which has since improved.  Primary care has reordered B12 level.  In order to evaluate for other reversible causes of neuropathy, we will add vitamin B1, B6, SPEP to her labs.  Orders:  •  Ambulatory Referral to Neurology  •  Ambulatory Referral to Physical Therapy; Future  •  Vitamin B1, whole blood; Future  •  Vitamin B6; Future  •  Protein electrophoresis, serum; Future  •  Hemoglobin A1C; Future    Balance problem  As above, the balance issue is likely a combination of lack of sensation in the feet from the peripheral neuropathy as well as spinal stenosis.    I have recommended PT.  Patient plans to have it done close to her home in [place].  She says it is a [facility] facility for PT.    Orders:  •  Ambulatory Referral to Physical Therapy; Future    Encounter for screening for diabetes mellitus  Most recent hemoglobin A1c is from .  Given worsening balance and numbness in the feet, we will recheck HbA1c.    Orders:  •  Hemoglobin A1C; Future      She will follow-up in 6 months or sooner if difficulties.  I will update her by  portal regarding the lab results.          History of Present Illness   Ms. Solomon is an 84-year-old lady who presents for new neuromuscular consultation for difficulty with her balance. As per primary care note, the patient has had symptoms of neuropathy for a long time, failed gabapentin in the past and her symptoms are worsening.    She is having trouble with her balance. Symptoms started at least 2 yrs ago. Pain mgt suggested seeing neurology, but it was delayed by the pandemic. Symptoms have been worsening over time. She is becoming more dependent on her walker when she goes out. She uses her cane at home. She does not feel secure unless she touches something.   No vertigo. The lack of sensation in her feet has worsened over time.  She had an EMG done by Dr. 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  She has superimposed lumbar spinal stenosis.    She had PT for her knees in 2024. She has never had PT for balance.     She has some pain in the toes and slightly more proximally. The pain is infrequent.  She had tried gabapentin in the past but it made her very dizzy and she had to stop it.    She has fallen 3 times in the last 5 years. With her first fall, she got up to go the bathroom in the middle of the night and her knee gave out. The second time, she tripped over her cat carrier and had a black eye. 3rd episode, she went downstairs to  a package. She tried to push a package to the elevator. The package got stuck and she fell on her tailbone.     She follows with pain management, and undergoes spinal injections. With her last injection, she had pain and then developed radicular pain. She was given a muscle relaxer but it made her extremely dizzy.         PMH: Peripheral neuropathy, lumbar radiculopathy, lumbar spinal stenosis, glaucoma, HLD, vitamin D deficiency, prediabetes, CKD,  hypothyroidism  PSH: Right breast biopsy, D&C, tonsillectomy, tubal ligation  Family: Pancreatic cancer, kidney disease  Social: She used to work at [employer], then to [email or web address]. Retired from [employer] in [place] in her late 50s. She now lives in [facility] ([facility]) and has a an elevator. She does not have grab bars in her home. She drives. She stopped smoking in 1992. Drinks occasionally, but did drink more in the 1950-60s. She also used marijuana during that time.       Review of Systems I have personally reviewed the MA's review of systems and made changes as necessary.    Constitutional:  Negative for appetite change, fatigue and fever.   HENT: Negative.  Negative for hearing loss, tinnitus, trouble swallowing and voice change.    Eyes: Negative.  Negative for photophobia, pain and visual disturbance.   Respiratory: Negative.  Negative for shortness of breath.    Cardiovascular: Negative.  Negative for palpitations.   Gastrointestinal: Negative.  Negative for nausea and vomiting.   Endocrine: Negative.  Negative for cold intolerance.   Genitourinary: Negative.  Negative for dysuria, frequency and urgency.   Musculoskeletal:  Positive for gait problem (balance issues). Negative for back pain, myalgias, neck pain and neck stiffness.   Skin: Negative.  Negative for rash.   Allergic/Immunologic: Negative.    Neurological:  Positive for numbness (b/l feet/ L arm/hand). Negative for dizziness, tremors, seizures, syncope, facial asymmetry, speech difficulty, weakness, light-headedness and headaches.   Hematological: Negative.  Does not bruise/bleed easily.   Psychiatric/Behavioral: Negative.  Negative for confusion, hallucinations and sleep disturbance.          Objective   /76 (BP Location: Right arm, Patient Position: Sitting, Cuff Size: Adult)   Pulse 71   Temp (!) 97.3 °F (36.3 °C) (Temporal)   Wt 74.9 kg (165 lb 3.2 oz)   SpO2 99%   BMI 33.37 kg/m²     Physical Exam  Constitutional:       Appearance:  Normal appearance.   HENT:      Mouth/Throat:      Mouth: Mucous membranes are moist.      Pharynx: Oropharynx is clear.   Eyes:      Conjunctiva/sclera: Conjunctivae normal.   Cardiovascular:      Rate and Rhythm: Normal rate and regular rhythm.      Heart sounds: Normal heart sounds.   Pulmonary:      Effort: Pulmonary effort is normal.      Breath sounds: Normal breath sounds.   Psychiatric:         Mood and Affect: Mood normal.         Behavior: Behavior normal.       Neurological Exam  Mental status: Awake, alert, oriented to person, place and time.  Naming, knowledge, repetition, concentration and recall intact.    Cranial nerves: Extraocular movements intact.  Pupils equally round and reactive to light.  Visual fields full to confrontation.  Facial sensation intact.  Face symmetric.  Speech clear. Hearing intact.  Tongue, uvula, palate midline and intact.  Sternocleidomastoid intact.    Motor:   Deltoid: Right 5/5, left 5/5  Biceps: Right 5/5, left 5/5  Triceps: Right 5/5, left 5/5  : Right 5/5, left 5/5  Intrinsic hand muscles: Right 4/5, left 4/5  Abductor pollicis brevis: Right 4/5, left 4/5    Iliopsoas: Right 5/5, left 5/5  Quadriceps: Right 5/5, left 5/5  Tibialis anterior: Right 5/5, left 5/5  Medial gastrocnemius: Right 5/5, left 5/5  Extensor hallucis longus: Right 4/5, left 4/5  Abductor hallucis: Right 4/5, left 4/5    + Hammertoes and high arches    Cerebellar: No dysmetria.  Heel-to-shin intact.  She walks with a walker.  Slight steppage gait on the left side.    Reflexes:   DTRs absent.  Plantar responses equivocal.    Sensory: Light touch intact x 4.  Temperature reduced in a stocking and glove distribution in the bilateral upper and right lower extremities.  Pinprick normal.  Vibration 19 seconds at the great toes.  Position sense intact.  Slight decrease in temperature in the right medial leg.  Romberg abnormal.    Data:  2/24/2025:  BUN 13  Creatinine 0.74  AST 19  ALT 24  TSH  2.062    1/27/2021:  Vitamin B12 1343    6/28/2023:  HbA1c 5.5    12/16/2019:  Vitamin B12 271    MRI lumbar spine 9/1/2022:  1. Stable multifactorial severe spinal stenosis at L4-L5. There is moderate  spinal stenosis at L3-L4 and mild to moderate spinal stenosis at L2-L3.  2. Neural foraminal narrowing at L4-L5, with impingement on the bilateral  exiting L4 nerve roots, similar to the prior study.      L4-L5: Approximately 8 mm anterolisthesis of L4 over L5. There is disc bulge,  facet arthrosis and thickening of ligamentum flavum, with stable severe spinal  stenosis causing crowding of the cauda equina nerve roots. There is moderate  neural foraminal narrowing with impingement on the bilateral exiting L4 nerve  roots.    EMG BLE 11/26/2019:  The study was done by Dr. Michele Bustos.  I personally reviewed the data.  This is an abnormal study. There is clinical and electrophysiologic evidence of a generalized, mild-moderate, axonal, motor greater than sensory polyneuropathy. Common etiologies for generalized axonal polyneuropathies include diabetes or impaired glucose tolerance, vitamin B12 deficiency, and thyroid dysfunction. There is no clear electrophysiologic evidence of a lumbosacral radiculopathy on the left, the patient’s more symptomatic side. Incidental note is made of a non-localizable peroneal neuropathy on the right. Clinical correlation is advised.        Administrative Statements   I have spent a total time of 50 minutes in caring for this patient on the day of the visit/encounter including Diagnostic results, Prognosis, Risks and benefits of tx options, Instructions for management, Patient and family education, Importance of tx compliance, Risk factor reductions, Impressions, Counseling / Coordination of care, Documenting in the medical record, Reviewing/placing orders in the medical record (including tests, medications, and/or procedures), and Obtaining or reviewing history  .

## 2025-04-02 NOTE — PROGRESS NOTES
Name: Yennifer Vanegas      : 1940      MRN: [id number]  Encounter Provider: Willow Montoya MD  Encounter Date: 2025   Encounter department: [facility] [place]  :  Assessment & Plan  Idiopathic peripheral neuropathy    Orders:  •  Ambulatory Referral to Neurology      {Ambulatory Patient Instructions (Optional):67008}    History of Present Illness {?Quick Links Encounters * My Last Note * Last Note in Specialty * Snapshot * Since Last Visit * History :94576}  HPI   Review of Systems   Constitutional:  Negative for appetite change, fatigue and fever.   HENT: Negative.  Negative for hearing loss, tinnitus, trouble swallowing and voice change.    Eyes: Negative.  Negative for photophobia, pain and visual disturbance.   Respiratory: Negative.  Negative for shortness of breath.    Cardiovascular: Negative.  Negative for palpitations.   Gastrointestinal: Negative.  Negative for nausea and vomiting.   Endocrine: Negative.  Negative for cold intolerance.   Genitourinary: Negative.  Negative for dysuria, frequency and urgency.   Musculoskeletal:  Positive for gait problem (balance issues). Negative for back pain, myalgias, neck pain and neck stiffness.   Skin: Negative.  Negative for rash.   Allergic/Immunologic: Negative.    Neurological:  Positive for numbness (b/l feet/ L arm/hand). Negative for dizziness, tremors, seizures, syncope, facial asymmetry, speech difficulty, weakness, light-headedness and headaches.   Hematological: Negative.  Does not bruise/bleed easily.   Psychiatric/Behavioral: Negative.  Negative for confusion, hallucinations and sleep disturbance.     I have personally reviewed the MA's review of systems and made changes as necessary.    {Select to insert medical history sections (Optional):28590}     Objective {?Quick Links Trend Vitals * Enter New Vitals * Results Review * Timeline (Adult) * Labs * Imaging * Cardiology * Procedures * Lung Cancer Screening *  Surgical eConsent :75055}  /76 (BP Location: Right arm, Patient Position: Sitting, Cuff Size: Adult)   Pulse 71   Temp (!) 97.3 °F (36.3 °C) (Temporal)   Wt 74.9 kg (165 lb 3.2 oz)   SpO2 99%   BMI 33.37 kg/m²     Physical Exam  Neurological Exam    {Radiology Results Review (Optional):02029}    {Administrative / Billing Section (Optional):07154}

## 2025-04-02 NOTE — ASSESSMENT & PLAN NOTE
Ms. Solomon is having difficulty with her balance.  She has a history of peripheral neuropathy with unclear etiology.  She had low amplitude sural sensory nerve action potentials by EMG several years ago.  Interestingly, on exam, large fiber sensation is actually intact other than Romberg abnormal.  Vibration sense and position sense are both intact.  She does have some small fiber sensory loss.    Difficulty with balance and numbness in the feet likely related to underlying neuropathy as well as lumbar spinal stenosis.    Previous workup has not revealed an etiology for the neuropathy.  She did have low normal B12 level in 2019 which has since improved.  Primary care has reordered B12 level.  In order to evaluate for other reversible causes of neuropathy, we will add vitamin B1, B6, SPEP to her labs.  Orders:  •  Ambulatory Referral to Neurology  •  Ambulatory Referral to Physical Therapy; Future  •  Vitamin B1, whole blood; Future  •  Vitamin B6; Future  •  Protein electrophoresis, serum; Future  •  Hemoglobin A1C; Future

## 2025-04-17 ENCOUNTER — APPOINTMENT (OUTPATIENT)
Dept: LAB | Facility: CLINIC | Age: 85
End: 2025-04-17
Payer: MEDICARE

## 2025-04-17 DIAGNOSIS — G60.9 IDIOPATHIC PERIPHERAL NEUROPATHY: ICD-10-CM

## 2025-04-17 DIAGNOSIS — E55.9 VITAMIN D DEFICIENCY, UNSPECIFIED: ICD-10-CM

## 2025-04-17 DIAGNOSIS — Z13.1 ENCOUNTER FOR SCREENING FOR DIABETES MELLITUS: ICD-10-CM

## 2025-04-17 DIAGNOSIS — N18.31 STAGE 3A CHRONIC KIDNEY DISEASE (HCC): ICD-10-CM

## 2025-04-17 LAB
25(OH)D3 SERPL-MCNC: 63.5 NG/ML (ref 30–100)
ALBUMIN SERPL BCG-MCNC: 4.1 G/DL (ref 3.5–5)
ANION GAP SERPL CALCULATED.3IONS-SCNC: 9 MMOL/L (ref 4–13)
BACTERIA UR QL AUTO: ABNORMAL /HPF
BILIRUB UR QL STRIP: NEGATIVE
BUN SERPL-MCNC: 11 MG/DL (ref 5–25)
CALCIUM SERPL-MCNC: 9.4 MG/DL (ref 8.4–10.2)
CHLORIDE SERPL-SCNC: 99 MMOL/L (ref 96–108)
CLARITY UR: CLEAR
CO2 SERPL-SCNC: 29 MMOL/L (ref 21–32)
COLOR UR: ABNORMAL
CREAT SERPL-MCNC: 0.79 MG/DL (ref 0.6–1.3)
CREAT UR-MCNC: 61.4 MG/DL
ERYTHROCYTE [DISTWIDTH] IN BLOOD BY AUTOMATED COUNT: 14.6 % (ref 11.6–15.1)
EST. AVERAGE GLUCOSE BLD GHB EST-MCNC: 117 MG/DL
GFR SERPL CREATININE-BSD FRML MDRD: 68 ML/MIN/1.73SQ M
GLUCOSE P FAST SERPL-MCNC: 81 MG/DL (ref 65–99)
GLUCOSE UR STRIP-MCNC: NEGATIVE MG/DL
HBA1C MFR BLD: 5.7 %
HCT VFR BLD AUTO: 40.3 % (ref 34.8–46.1)
HGB BLD-MCNC: 12.8 G/DL (ref 11.5–15.4)
HGB UR QL STRIP.AUTO: NEGATIVE
KETONES UR STRIP-MCNC: NEGATIVE MG/DL
LEUKOCYTE ESTERASE UR QL STRIP: NEGATIVE
MAGNESIUM SERPL-MCNC: 2.1 MG/DL (ref 1.9–2.7)
MCH RBC QN AUTO: 29.7 PG (ref 26.8–34.3)
MCHC RBC AUTO-ENTMCNC: 31.8 G/DL (ref 31.4–37.4)
MCV RBC AUTO: 94 FL (ref 82–98)
NITRITE UR QL STRIP: NEGATIVE
NON-SQ EPI CELLS URNS QL MICRO: ABNORMAL /HPF
PH UR STRIP.AUTO: 7 [PH]
PHOSPHATE SERPL-MCNC: 3.7 MG/DL (ref 2.3–4.1)
PLATELET # BLD AUTO: 259 THOUSANDS/UL (ref 149–390)
PMV BLD AUTO: 9.3 FL (ref 8.9–12.7)
POTASSIUM SERPL-SCNC: 4.2 MMOL/L (ref 3.5–5.3)
PROT UR STRIP-MCNC: NEGATIVE MG/DL
PROT UR-MCNC: 4.9 MG/DL
PROT/CREAT UR: 0.1 MG/G{CREAT}
RBC # BLD AUTO: 4.31 MILLION/UL (ref 3.81–5.12)
RBC #/AREA URNS AUTO: ABNORMAL /HPF
SODIUM SERPL-SCNC: 137 MMOL/L (ref 135–147)
SP GR UR STRIP.AUTO: 1.01 (ref 1–1.03)
UROBILINOGEN UR STRIP-ACNC: <2 MG/DL
WBC # BLD AUTO: 4.5 THOUSAND/UL (ref 4.31–10.16)
WBC #/AREA URNS AUTO: ABNORMAL /HPF

## 2025-04-17 PROCEDURE — 84156 ASSAY OF PROTEIN URINE: CPT

## 2025-04-17 PROCEDURE — 84165 PROTEIN E-PHORESIS SERUM: CPT

## 2025-04-17 PROCEDURE — 83735 ASSAY OF MAGNESIUM: CPT

## 2025-04-17 PROCEDURE — 36415 COLL VENOUS BLD VENIPUNCTURE: CPT

## 2025-04-17 PROCEDURE — 84425 ASSAY OF VITAMIN B-1: CPT

## 2025-04-17 PROCEDURE — 85027 COMPLETE CBC AUTOMATED: CPT

## 2025-04-17 PROCEDURE — 82306 VITAMIN D 25 HYDROXY: CPT

## 2025-04-17 PROCEDURE — 84207 ASSAY OF VITAMIN B-6: CPT

## 2025-04-17 PROCEDURE — 82570 ASSAY OF URINE CREATININE: CPT

## 2025-04-17 PROCEDURE — 83036 HEMOGLOBIN GLYCOSYLATED A1C: CPT

## 2025-04-17 PROCEDURE — 81001 URINALYSIS AUTO W/SCOPE: CPT

## 2025-04-17 PROCEDURE — 80069 RENAL FUNCTION PANEL: CPT

## 2025-04-18 ENCOUNTER — RESULTS FOLLOW-UP (OUTPATIENT)
Dept: NEUROLOGY | Facility: CLINIC | Age: 85
End: 2025-04-18

## 2025-04-18 LAB
ALBUMIN SERPL ELPH-MCNC: 3.71 G/DL (ref 3.2–5.1)
ALBUMIN SERPL ELPH-MCNC: 60.8 % (ref 48–70)
ALPHA1 GLOB SERPL ELPH-MCNC: 0.3 G/DL (ref 0.15–0.47)
ALPHA1 GLOB SERPL ELPH-MCNC: 4.9 % (ref 1.8–7)
ALPHA2 GLOB SERPL ELPH-MCNC: 0.79 G/DL (ref 0.42–1.04)
ALPHA2 GLOB SERPL ELPH-MCNC: 12.9 % (ref 5.9–14.9)
BETA GLOB ABNORMAL SERPL ELPH-MCNC: 0.41 G/DL (ref 0.31–0.57)
BETA1 GLOB SERPL ELPH-MCNC: 6.7 % (ref 4.7–7.7)
BETA2 GLOB SERPL ELPH-MCNC: 4.1 % (ref 3.1–7.9)
BETA2+GAMMA GLOB SERPL ELPH-MCNC: 0.25 G/DL (ref 0.2–0.58)
GAMMA GLOB ABNORMAL SERPL ELPH-MCNC: 0.65 G/DL (ref 0.4–1.66)
GAMMA GLOB SERPL ELPH-MCNC: 10.6 % (ref 6.9–22.3)
IGG/ALB SER: 1.55 {RATIO} (ref 1.1–1.8)
PROT SERPL-MCNC: 6.1 G/DL (ref 6.4–8.4)

## 2025-04-18 PROCEDURE — 84165 PROTEIN E-PHORESIS SERUM: CPT | Performed by: PATHOLOGY

## 2025-04-21 LAB — VIT B6 SERPL-MCNC: 28.8 UG/L (ref 3.4–65.2)

## 2025-04-22 LAB — VIT B1 BLD-SCNC: 109.7 NMOL/L (ref 66.5–200)

## 2025-04-23 ENCOUNTER — TELEPHONE (OUTPATIENT)
Dept: FAMILY MEDICINE CLINIC | Facility: CLINIC | Age: 85
End: 2025-04-23

## 2025-04-23 NOTE — TELEPHONE ENCOUNTER
Vetiary message was send to reschedule 6mo f/u appointment with another provider. Pt was informed Dr. Vargas is retiring.

## 2025-06-04 ENCOUNTER — APPOINTMENT (EMERGENCY)
Dept: RADIOLOGY | Facility: HOSPITAL | Age: 85
End: 2025-06-04
Payer: MEDICARE

## 2025-06-04 ENCOUNTER — HOSPITAL ENCOUNTER (EMERGENCY)
Facility: HOSPITAL | Age: 85
Discharge: HOME | End: 2025-06-04
Attending: EMERGENCY MEDICINE | Admitting: EMERGENCY MEDICINE
Payer: MEDICARE

## 2025-06-04 ENCOUNTER — OFFICE VISIT (OUTPATIENT)
Dept: URGENT CARE | Age: 85
End: 2025-06-04
Payer: MEDICARE

## 2025-06-04 VITALS
SYSTOLIC BLOOD PRESSURE: 192 MMHG | DIASTOLIC BLOOD PRESSURE: 79 MMHG | RESPIRATION RATE: 20 BRPM | TEMPERATURE: 98 F | OXYGEN SATURATION: 99 % | HEART RATE: 65 BPM

## 2025-06-04 VITALS
TEMPERATURE: 98.1 F | HEART RATE: 76 BPM | HEIGHT: 59 IN | OXYGEN SATURATION: 98 % | RESPIRATION RATE: 18 BRPM | SYSTOLIC BLOOD PRESSURE: 124 MMHG | BODY MASS INDEX: 33.26 KG/M2 | WEIGHT: 165 LBS | DIASTOLIC BLOOD PRESSURE: 70 MMHG

## 2025-06-04 DIAGNOSIS — S09.90XA INJURY OF HEAD, INITIAL ENCOUNTER: ICD-10-CM

## 2025-06-04 DIAGNOSIS — R55 SYNCOPE, UNSPECIFIED SYNCOPE TYPE: Primary | ICD-10-CM

## 2025-06-04 DIAGNOSIS — R55 SYNCOPE: ICD-10-CM
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
## 2025-06-04 DIAGNOSIS — M54.2 NECK PAIN: ICD-10-CM

## 2025-06-04 DIAGNOSIS — R42 LIGHTHEADEDNESS: ICD-10-CM
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
## 2025-06-04 LAB
2HR DELTA HS TROPONIN: 0 NG/L
ALBUMIN SERPL BCG-MCNC: 4.1 G/DL (ref 3.5–5)
ALP SERPL-CCNC: 69 U/L (ref 34–104)
ALT SERPL W P-5'-P-CCNC: 18 U/L (ref 7–52)
ANION GAP SERPL CALCULATED.3IONS-SCNC: 6 MMOL/L (ref 4–13)
AST SERPL W P-5'-P-CCNC: 21 U/L (ref 13–39)
BASOPHILS # BLD AUTO: 0.05 THOUSANDS/ÂΜL (ref 0–0.1)
BASOPHILS NFR BLD AUTO: 1 % (ref 0–1)
BILIRUB SERPL-MCNC: 0.4 MG/DL (ref 0.2–1)
BUN SERPL-MCNC: 9 MG/DL (ref 5–25)
CALCIUM SERPL-MCNC: 9.6 MG/DL (ref 8.4–10.2)
CARDIAC TROPONIN I PNL SERPL HS: 5 NG/L (ref ?–50)
CARDIAC TROPONIN I PNL SERPL HS: 5 NG/L (ref ?–50)
CHLORIDE SERPL-SCNC: 99 MMOL/L (ref 96–108)
CO2 SERPL-SCNC: 27 MMOL/L (ref 21–32)
CREAT SERPL-MCNC: 0.72 MG/DL (ref 0.6–1.3)
EOSINOPHIL # BLD AUTO: 0.2 THOUSAND/ÂΜL (ref 0–0.61)
EOSINOPHIL NFR BLD AUTO: 3 % (ref 0–6)
ERYTHROCYTE [DISTWIDTH] IN BLOOD BY AUTOMATED COUNT: 13.7 % (ref 11.6–15.1)
GFR SERPL CREATININE-BSD FRML MDRD: 76 ML/MIN/1.73SQ M
GLUCOSE SERPL-MCNC: 100 MG/DL (ref 65–140)
GLUCOSE SERPL-MCNC: 92 MG/DL (ref 65–140)
GLUCOSE SERPL-MCNC: 98 MG/DL (ref 65–140)
HCT VFR BLD AUTO: 37.5 % (ref 34.8–46.1)
HGB BLD-MCNC: 11.9 G/DL (ref 11.5–15.4)
IMM GRANULOCYTES # BLD AUTO: 0.08 THOUSAND/UL (ref 0–0.2)
IMM GRANULOCYTES NFR BLD AUTO: 1 % (ref 0–2)
LYMPHOCYTES # BLD AUTO: 1.28 THOUSANDS/ÂΜL (ref 0.6–4.47)
LYMPHOCYTES NFR BLD AUTO: 16 % (ref 14–44)
MCH RBC QN AUTO: 29.4 PG (ref 26.8–34.3)
MCHC RBC AUTO-ENTMCNC: 31.7 G/DL (ref 31.4–37.4)
MCV RBC AUTO: 93 FL (ref 82–98)
MONOCYTES # BLD AUTO: 0.51 THOUSAND/ÂΜL (ref 0.17–1.22)
MONOCYTES NFR BLD AUTO: 6 % (ref 4–12)
NEUTROPHILS # BLD AUTO: 5.89 THOUSANDS/ÂΜL (ref 1.85–7.62)
NEUTS SEG NFR BLD AUTO: 73 % (ref 43–75)
NRBC BLD AUTO-RTO: 0 /100 WBCS
PLATELET # BLD AUTO: 248 THOUSANDS/UL (ref 149–390)
PMV BLD AUTO: 9.5 FL (ref 8.9–12.7)
POTASSIUM SERPL-SCNC: 3.8 MMOL/L (ref 3.5–5.3)
PROT SERPL-MCNC: 6.5 G/DL (ref 6.4–8.4)
RBC # BLD AUTO: 4.05 MILLION/UL (ref 3.81–5.12)
SODIUM SERPL-SCNC: 132 MMOL/L (ref 135–147)
WBC # BLD AUTO: 8.01 THOUSAND/UL (ref 4.31–10.16)

## 2025-06-04 PROCEDURE — 82948 REAGENT STRIP/BLOOD GLUCOSE: CPT

## 2025-06-04 PROCEDURE — 93005 ELECTROCARDIOGRAM TRACING: CPT

## 2025-06-04 PROCEDURE — 96360 HYDRATION IV INFUSION INIT: CPT

## 2025-06-04 PROCEDURE — 96361 HYDRATE IV INFUSION ADD-ON: CPT

## 2025-06-04 PROCEDURE — 99215 OFFICE O/P EST HI 40 MIN: CPT | Performed by: EMERGENCY MEDICINE

## 2025-06-04 PROCEDURE — 36415 COLL VENOUS BLD VENIPUNCTURE: CPT | Performed by: EMERGENCY MEDICINE

## 2025-06-04 PROCEDURE — 70450 CT HEAD/BRAIN W/O DYE: CPT

## 2025-06-04 PROCEDURE — 85025 COMPLETE CBC W/AUTO DIFF WBC: CPT | Performed by: EMERGENCY MEDICINE

## 2025-06-04 PROCEDURE — 84484 ASSAY OF TROPONIN QUANT: CPT | Performed by: EMERGENCY MEDICINE

## 2025-06-04 PROCEDURE — G0463 HOSPITAL OUTPT CLINIC VISIT: HCPCS | Performed by: EMERGENCY MEDICINE

## 2025-06-04 PROCEDURE — 80053 COMPREHEN METABOLIC PANEL: CPT | Performed by: EMERGENCY MEDICINE

## 2025-06-04 PROCEDURE — 99285 EMERGENCY DEPT VISIT HI MDM: CPT | Performed by: EMERGENCY MEDICINE

## 2025-06-04 PROCEDURE — 82948 REAGENT STRIP/BLOOD GLUCOSE: CPT | Performed by: EMERGENCY MEDICINE

## 2025-06-04 PROCEDURE — 99285 EMERGENCY DEPT VISIT HI MDM: CPT

## 2025-06-04 PROCEDURE — 72125 CT NECK SPINE W/O DYE: CPT

## 2025-06-04 RX ADMIN — SODIUM CHLORIDE 1000 ML: 0.9 INJECTION, SOLUTION INTRAVENOUS at 15:26

## 2025-06-04 NOTE — PROGRESS NOTES
"  [facility]        NAME: Yennifer Vanegas is a 84 y.o. female  : 1940    MRN: [id number]  DATE: 2025  TIME: 2:23 PM    Assessment and Plan   Syncope, unspecified syncope type [R55]  1. Syncope, unspecified syncope type  Transfer to other facility      2. Fall, initial encounter  ECG 12 lead    Transfer to other facility      3. Lightheadedness  Transfer to other facility      4. Neck pain  Transfer to other facility      5. Injury of head, initial encounter  Transfer to other facility        Twelve-lead EKG revealed sinus rhythm at a ventricular rate of 74 with otherwise normal axis, intervals and no acute ischemic changes.    Point-of-care glucose noted to be 100 mg/dL.  Patient otherwise without focal logical deficit, GCS 15.    Given age and endorsed syncope midline cervical spinal tenderness noted on initial primary survey for which a rigid cervical collar was immediately placed, as well as head injury patient was referred to the emergency room for further evaluation.  Patient transported to the ED via EMS.      Patient Instructions       Follow up with PCP in 3-5 days.  Proceed to  ER if symptoms worsen.    If tests have been performed at [facility], our office will contact you with results if changes need to be made to the care plan discussed with you at the visit.  [facility]    Chief Complaint     Chief Complaint   Patient presents with    Fall     Noon, Felt dizziness before fall, hitting head and cut on left index finger         History of Present Illness       84-year-old female with history of glaucoma, meningioma, hyperlipidemia, peripheral neuropathy, asthma, CKD 3B, hypothyroidism presents with a chief complaint of a syncopal episode while standing in her bedroom which occurred approximately 1 hour prior to arrival.  Patient states that she was standing in her bedroom, and started feeling lightheaded and \"woozy\" and did experience some " "antecedent chest pain and palpitations before syncopized and falling to the ground.  Patient does not recall falling to the ground, however states that she did wake up on her back and was \"still feeling woozy\" after the event.  States that she utilizes a cane at baseline for ambulation however has had to use a walker since her fall earlier today due to feeling unsteady on her feet.  She states that she noticed a \"lump\" on the back of her head since the fall and has been experiencing neck pain which is new since her fall and syncopal episode and was not present prior to injury.  She also states that she noticed a small laceration to the distal dorsal aspect of her left index finger which was also new since her fall.  She otherwise denies current headache, visual changes, speech changes, or focal numbness, tingling, or weakness in her extremities.  She denies active chest pain, or shortness of breath on current evaluation in clinic.  She does endorse some bilateral leg swelling which has been progressively worse over the past several weeks for which she has been taking Lasix for without relief. She also endorses having a \"chest cold\" over the last several days as well to include a dry nonproductive cough, nasal congestion which she initially attributed her symptoms to.  She also endorses experiencing a right-sided headache yesterday evening while watching TV which is since resolved.  Denies a history of seizures, not currently on oral anticoagulation or antiplatelet therapy.    Fall  The accident occurred 1 to 3 hours ago. The pain is present in the head and neck. The pain is at a severity of 2/10. The pain is mild. The symptoms are aggravated by ambulation and standing. Associated symptoms include headaches and a loss of consciousness. Pertinent negatives include no abdominal pain, bowel incontinence, fever, hearing loss, hematuria, nausea, numbness, tingling, visual change or vomiting. She has tried nothing for the " symptoms.       Review of Systems   Review of Systems   Constitutional:  Negative for activity change, appetite change, chills, diaphoresis, fatigue, fever and unexpected weight change.   HENT:  Positive for congestion and postnasal drip. Negative for dental problem, drooling, ear discharge, ear pain, facial swelling, hearing loss, mouth sores, nosebleeds, rhinorrhea, sinus pressure, sinus pain, sneezing, sore throat, tinnitus, trouble swallowing and voice change.    Eyes:  Negative for photophobia, pain, discharge, redness, itching and visual disturbance.   Respiratory:  Negative for apnea, cough, choking, chest tightness, shortness of breath, wheezing and stridor.    Cardiovascular:  Positive for chest pain, palpitations and leg swelling.   Gastrointestinal:  Negative for abdominal distention, abdominal pain, anal bleeding, blood in stool, bowel incontinence, constipation, diarrhea, nausea, rectal pain and vomiting.   Genitourinary:  Negative for dysuria and hematuria.   Musculoskeletal:  Positive for gait problem, neck pain and neck stiffness. Negative for arthralgias, back pain, joint swelling and myalgias.   Skin:  Negative for color change, pallor, rash and wound.   Neurological:  Positive for loss of consciousness, light-headedness and headaches. Negative for dizziness, tingling, tremors, seizures, syncope, facial asymmetry, speech difficulty, weakness and numbness.   All other systems reviewed and are negative.        Current Medications     Current Medications[1]    Current Allergies     Allergies as of 06/04/2025 - Reviewed 06/04/2025   Allergen Reaction Noted    Molds & smuts Shortness Of Breath 07/09/2019    Tizanidine Diarrhea 06/04/2025    Penicillins Hives, Rash, and Edema 04/08/2014            The following portions of the patient's history were reviewed and updated as appropriate: allergies, current medications, past family history, past medical history, past social history, past surgical history  "and problem list.     Past Medical History[2]    Past Surgical History[3]    Family History[4]      Medications have been verified.        Objective   /70 (BP Location: Right arm, Patient Position: Sitting, Cuff Size: Large)   Pulse 76   Temp 98.1 °F (36.7 °C)   Resp 18   Ht 4' 11\" (1.499 m)   Wt 74.8 kg (165 lb)   SpO2 98%   BMI 33.33 kg/m²   No LMP recorded. Patient is postmenopausal.       Physical Exam     Physical Exam  Vitals and nursing note reviewed.   Constitutional:       General: She is not in acute distress.     Appearance: Normal appearance. She is normal weight. She is not ill-appearing, toxic-appearing or diaphoretic.   HENT:      Head: Normocephalic. Laceration present.        Right Ear: External ear normal.      Left Ear: External ear normal.      Nose: Nose normal. No congestion or rhinorrhea.      Mouth/Throat:      Mouth: Mucous membranes are moist.      Pharynx: Oropharynx is clear. No oropharyngeal exudate or posterior oropharyngeal erythema.     Eyes:      General: No visual field deficit or scleral icterus.        Right eye: No discharge.         Left eye: No discharge.      Extraocular Movements: Extraocular movements intact.      Pupils: Pupils are equal, round, and reactive to light.     Neck:      Vascular: No carotid bruit.     Cardiovascular:      Rate and Rhythm: Normal rate and regular rhythm.      Pulses: Normal pulses.           Radial pulses are 2+ on the right side and 2+ on the left side.      Heart sounds: No murmur heard.     No friction rub. No gallop.   Pulmonary:      Effort: Pulmonary effort is normal. No respiratory distress.      Breath sounds: Normal breath sounds. No stridor. No wheezing, rhonchi or rales.   Chest:      Chest wall: No tenderness.   Abdominal:      General: Abdomen is flat. There is no distension.      Palpations: Abdomen is soft. There is no mass.      Tenderness: There is no abdominal tenderness. There is no right CVA tenderness, left CVA " tenderness, guarding or rebound.      Hernia: No hernia is present.     Musculoskeletal:         General: Tenderness and signs of injury present. No swelling or deformity.      Cervical back: Normal range of motion and neck supple. Tenderness present.      Right lower le+ Pitting Edema present.      Left lower le+ Pitting Edema present.     Skin:     General: Skin is warm and dry.      Capillary Refill: Capillary refill takes less than 2 seconds.      Findings: Bruising and laceration present.          Neurological:      General: No focal deficit present.      Mental Status: She is alert and oriented to person, place, and time.      GCS: GCS eye subscore is 4. GCS verbal subscore is 5. GCS motor subscore is 6.      Cranial Nerves: Cranial nerves 2-12 are intact. No cranial nerve deficit, dysarthria or facial asymmetry.      Sensory: Sensation is intact. No sensory deficit.      Motor: Motor function is intact. No weakness, tremor, atrophy, abnormal muscle tone, seizure activity or pronator drift.      Coordination: Coordination is intact. Coordination normal. Finger-Nose-Finger Test normal.      Gait: Gait abnormal.      Comments: Patient unsteady on her feet while standing and unable to ambulate independently secondary to endorsing lightheadedness.   Psychiatric:         Mood and Affect: Mood normal.         Behavior: Behavior normal.                          [1]   Current Outpatient Medications:     acetaminophen (TYLENOL) 650 mg CR tablet, Take 650 mg by mouth every 8 (eight) hours as needed for mild pain, Disp: , Rfl:     albuterol (PROVENTIL HFA,VENTOLIN HFA) 90 mcg/act inhaler, Inhale 2 puffs every 6 (six) hours as needed for wheezing, Disp: 8.5 g, Rfl: 1    Cholecalciferol (VITAMIN D) 2000 units CAPS, Take 1 tablet by mouth in the morning., Disp: , Rfl:     co-enzyme Q-10 30 MG capsule, Take 30 mg by mouth in the morning., Disp: , Rfl:     cyanocobalamin (VITAMIN B-12) 1000 MCG tablet, Take 1 tablet  by mouth in the morning, Disp: , Rfl:     furosemide (LASIX) 20 mg tablet, Take 20 mg by mouth in the morning., Disp: , Rfl:     levothyroxine 50 mcg tablet, TAKE 1 TABLET (50 MCG TOTAL) BY MOUTH DAILY, Disp: 90 tablet, Rfl: 3    Lumigan 0.01 % ophthalmic drops, INSTILL ONE DROP IN BOTH EYES EVERY NIGHT AT BEDTIME, Disp: 5 mL, Rfl: 4    Multiple Vitamins-Minerals (EMERGEN-C IMMUNE PO), , Disp: , Rfl:     pravastatin (PRAVACHOL) 20 mg tablet, TAKE 1 TABLET (20 MG TOTAL) BY MOUTH EVERY OTHER DAY, Disp: 45 tablet, Rfl: 2  [2]   Past Medical History:  Diagnosis Date    Arthritis     Colon polyps     Disease of thyroid gland     Hyperlipidemia     Migraine     Pure hypercholesterolemia     Thyroid disease    [3]   Past Surgical History:  Procedure Laterality Date    BREAST BIOPSY Right     benign many yrs ago    DILATION AND CURETTAGE, DIAGNOSTIC / THERAPEUTIC      MAMMO STEREOTACTIC BREAST BIOPSY RIGHT (ALL INC) Right     yrs ago-benign    TONSILLECTOMY      TUBAL LIGATION     [4]   Family History  Problem Relation Name Age of Onset    Pancreatic cancer Mother  70        susceptibility     Kidney disease Father      Pancreatic cancer Maternal Aunt          over 50    Pancreatic cancer Paternal Aunt          over 50    No Known Problems Maternal Grandmother      No Known Problems Paternal Grandmother      No Known Problems Maternal Aunt      No Known Problems Maternal Aunt      No Known Problems Maternal Aunt      No Known Problems Paternal Aunt

## 2025-06-04 NOTE — ED PROVIDER NOTES
"Time reflects when diagnosis was documented in both MDM as applicable and the Disposition within this note       Time User Action Codes Description Comment    6/4/2025  5:35 PM Suman Beltran Add [W19.XXXA] Fall, initial encounter     6/4/2025  5:35 PM Suman Beltran R Add [S09.90XA] Injury of head, initial encounter     6/4/2025  5:35 PM Suman Beltran Add [R55] Syncope           ED Disposition       ED Disposition   Discharge    Condition   Stable    Date/Time   Wed Jun 4, 2025  5:35 PM    Comment   Yennifer Vanegas discharge to home/self care.                   Assessment & Plan       Medical Decision Making  Background: 84 y.o. female presents with chief complaint of lightheadedness and dizziness.  Syncopal episode at home landing backwards striking her head, positive loss of consciousness no blood thinners.  Went to urgent care and sent in for evaluation she is in a current c-collar,  Differential Diagnosis: arrhythmia, atypical acs, anemia, metabolic derangement, concussion, vertigo, dehydration, head injury, intracranial hemorrhage,  Plan: cardiac workup, symptomatic treatment, possible admission CT head and neck      Amount and/or Complexity of Data Reviewed  Labs: ordered.  Radiology: ordered and independent interpretation performed.  ECG/medicine tests: ordered and independent interpretation performed.    Risk  Decision regarding hospitalization.        ED Course as of 06/04/25 1755   Wed Jun 04, 2025   1541 CT scans unremarkable troponin of 5 at this point repeat troponin.       Medications   sodium chloride 0.9 % bolus 1,000 mL (1,000 mL Intravenous New Bag 6/4/25 1526)       ED Risk Strat Scores                    No data recorded                            History of Present Illness       Chief Complaint   Patient presents with    Fall     Patient fell at home after an appointment today. Patient reports feeling light-headed and fell backwards. Patient reports recently having \"balance issues for a while\" " and is seeing neuro. Did hit head but denies thinners and asa. Patient reports 2 bumps on head but denies pain at this time.        Past Medical History[1]   Past Surgical History[2]   Family History[3]   Social History[4]   E-Cigarette/Vaping    E-Cigarette Use Never User       E-Cigarette/Vaping Substances    Nicotine No     THC No     CBD No     Flavoring No       I have reviewed and agree with the history as documented.     84-year-old female with noted syncopal episode and fall at home landing on her back of her head, was able to get up and call 911.  Awake and alert this point in time with noted contusions went to urgent care sent the patient in here for further evaluation.      Fall  Associated symptoms: no abdominal pain, no back pain, no chest pain, no seizures and no vomiting        Review of Systems   Constitutional:  Negative for chills and fever.   HENT:  Negative for ear pain and sore throat.    Eyes:  Negative for pain and visual disturbance.   Respiratory:  Negative for cough and shortness of breath.    Cardiovascular:  Negative for chest pain and palpitations.   Gastrointestinal:  Negative for abdominal pain and vomiting.   Genitourinary:  Negative for dysuria and hematuria.   Musculoskeletal:  Negative for arthralgias and back pain.   Skin:  Negative for color change and rash.   Neurological:  Negative for seizures and syncope.   All other systems reviewed and are negative.          Objective       ED Triage Vitals   Temperature Pulse Blood Pressure Respirations SpO2 Patient Position - Orthostatic VS   06/04/25 1617 06/04/25 1437 06/04/25 1437 06/04/25 1437 06/04/25 1437 06/04/25 1437   98 °F (36.7 °C) 74 (!) 177/88 16 98 % Sitting      Temp Source Heart Rate Source BP Location FiO2 (%) Pain Score    06/04/25 1617 06/04/25 1437 06/04/25 1437 -- 06/04/25 1437    Tympanic Monitor Right arm  No Pain      Vitals      Date and Time Temp Pulse SpO2 Resp BP Pain Score FACES Pain Rating User   06/04/25  1645 -- 71 97 % 20 166/71 -- --    06/04/25 1617 98 °F (36.7 °C) -- -- -- -- -- -- TW   06/04/25 1615 -- 75 99 % 18 171/76 -- -- TW   06/04/25 1437 -- 74 98 % 16 177/88 No Pain -- TW            Physical Exam  Vitals and nursing note reviewed.   Constitutional:       Appearance: She is well-developed.   HENT:      Head: Normocephalic.      Comments: hematoma to the posterior occiput     Mouth/Throat:      Pharynx: No oropharyngeal exudate.     Eyes:      Pupils: Pupils are equal, round, and reactive to light.     Neck:      Trachea: No tracheal deviation.      Comments: C-collar in  place.     Cardiovascular:      Rate and Rhythm: Normal rate.      Heart sounds: Normal heart sounds. No murmur heard.     No friction rub. No gallop.   Pulmonary:      Effort: Pulmonary effort is normal. No respiratory distress.      Breath sounds: No wheezing or rales.   Abdominal:      General: There is no distension.      Palpations: Abdomen is soft.      Tenderness: There is no abdominal tenderness. There is no guarding or rebound.     Musculoskeletal:         General: No tenderness. Normal range of motion.     Skin:     General: Skin is warm.      Findings: No rash.     Neurological:      Mental Status: She is alert and oriented to person, place, and time.      Comments: GCS 15. AAOx4. No focal neuro deficits. CN II-XII intact. PERRL. EOMI. . No pronator drift.  strength 5/5 bilaterally. B/L UE strength 5/5 throughout. Finger to nose normal. Cerebellar function normal. Ambulates without difficulty. B/L LE strength 5/5 throughout. Gross sensation to b/l upper and lower extremities intact.           Results Reviewed       Procedure Component Value Units Date/Time    HS Troponin I 2hr [[id number]]  (Normal) Collected: 06/04/25 1646    Lab Status: Final result Specimen: Blood from Arm, Left Updated: 06/04/25 1724     hs TnI 2hr 5 ng/L      Delta 2hr hsTnI 0 ng/L     HS Troponin I 4hr [[id number]]     Lab Status: No result Specimen:  Blood     CBC and differential [[id number]] Collected: 06/04/25 1444    Lab Status: Final result Specimen: Blood from Arm, Left Updated: 06/04/25 1529     WBC 8.01 Thousand/uL      RBC 4.05 Million/uL      Hemoglobin 11.9 g/dL      Hematocrit 37.5 %      MCV 93 fL      MCH 29.4 pg      MCHC 31.7 g/dL      RDW 13.7 %      MPV 9.5 fL      Platelets 248 Thousands/uL      nRBC 0 /100 WBCs      Segmented % 73 %      Immature Grans % 1 %      Lymphocytes % 16 %      Monocytes % 6 %      Eosinophils Relative 3 %      Basophils Relative 1 %      Absolute Neutrophils 5.89 Thousands/µL      Absolute Immature Grans 0.08 Thousand/uL      Absolute Lymphocytes 1.28 Thousands/µL      Absolute Monocytes 0.51 Thousand/µL      Eosinophils Absolute 0.20 Thousand/µL      Basophils Absolute 0.05 Thousands/µL     HS Troponin 0hr (reflex protocol) [[id number]]  (Normal) Collected: 06/04/25 1444    Lab Status: Final result Specimen: Blood from Arm, Left Updated: 06/04/25 1526     hs TnI 0hr 5 ng/L     Comprehensive metabolic panel [[id number]]  (Abnormal) Collected: 06/04/25 1444    Lab Status: Final result Specimen: Blood from Arm, Left Updated: 06/04/25 1515     Sodium 132 mmol/L      Potassium 3.8 mmol/L      Chloride 99 mmol/L      CO2 27 mmol/L      ANION GAP 6 mmol/L      BUN 9 mg/dL      Creatinine 0.72 mg/dL      Glucose 92 mg/dL      Calcium 9.6 mg/dL      AST 21 U/L      ALT 18 U/L      Alkaline Phosphatase 69 U/L      Total Protein 6.5 g/dL      Albumin 4.1 g/dL      Total Bilirubin 0.40 mg/dL      eGFR 76 ml/min/1.73sq m     Narrative:      National Kid[employer] Foundation guidelines for Chronic Kidney Disease (CKD):     Stage 1 with normal or high GFR (GFR > 90 mL/min/1.73 square meters)    Stage 2 Mild CKD (GFR = 60-89 mL/min/1.73 square meters)    Stage 3A Moderate CKD (GFR = 45-59 mL/min/1.73 square meters)    Stage 3B Moderate CKD (GFR = 30-44 mL/min/1.73 square meters)    Stage 4 Severe CKD (GFR = 15-29 mL/min/1.73 square  meters)    Stage 5 End Stage CKD (GFR <15 mL/min/1.73 square meters)  Note: GFR calculation is accurate only with a steady state creatinine    Fingerstick Glucose (POCT) [[id number]]  (Normal) Collected: 06/04/25 1453    Lab Status: Final result Specimen: Blood Updated: 06/04/25 1456     POC Glucose 98 mg/dl             CT head without contrast   Final Interpretation by Jerry Mcconnell MD (06/04 7778)      No acute intracranial abnormality.      Small extra-axial fat-containing lesion along the right temporal convexity, which likely represents a small lipoma or fatty meningioma. This appears stable when compared to prior brain MRIs from 2014.            I personally discussed this study with DR. SALDANA on 6/4/2025 3:37 PM.                              Workstation performed: [id number]         CT cervical spine without contrast   Final Interpretation by Jerry Mcconnell MD (06/04 1536)      No cervical spine fracture or traumatic malalignment.      Multilevel cervical spondylosis, as described above.      Right thyroid nodule measuring up to 3 cm in maximal dimensions, incompletely evaluated on this examination. This can be further evaluated with thyroid ultrasound on a nonemergent basis.         I personally discussed this study with Dr. SALDANA on 6/4/2025 3:36 PM.                  Workstation performed: [id number]             ECG 12 Lead Documentation Only    Date/Time: 6/4/2025 2:44 PM    Performed by: Suman Beltran DO  Authorized by: Suman Beltran DO    ECG reviewed by me, the ED Provider: yes    Patient location:  ED  Previous ECG:     Previous ECG:  Compared to current    Similarity:  No change  Interpretation:     Interpretation: normal    Rate:     ECG rate assessment: normal    Rhythm:     Rhythm: sinus rhythm    Ectopy:     Ectopy: none    QRS:     QRS axis:  Normal    QRS intervals:  Normal  Conduction:     Conduction: normal    ST segments:     ST segments:  Normal  T waves:     T waves: normal        ED Medication  and Procedure Management   Prior to Admission Medications   Prescriptions Last Dose Informant Patient Reported? Taking?   Cholecalciferol (VITAMIN D) 2000 units CAPS   Yes No   Sig: Take 1 tablet by mouth in the morning.   Lumigan 0.01 % ophthalmic drops   No No   Sig: INSTILL ONE DROP IN BOTH EYES EVERY NIGHT AT BEDTIME   Multiple Vitamins-Minerals (EMERGEN-C IMMUNE PO)   Yes No   acetaminophen (TYLENOL) 650 mg CR tablet   Yes No   Sig: Take 650 mg by mouth every 8 (eight) hours as needed for mild pain   albuterol (PROVENTIL HFA,VENTOLIN HFA) 90 mcg/act inhaler   No No   Sig: Inhale 2 puffs every 6 (six) hours as needed for wheezing   co-enzyme Q-10 30 MG capsule   Yes No   Sig: Take 30 mg by mouth in the morning.   cyanocobalamin (VITAMIN B-12) 1000 MCG tablet   Yes No   Sig: Take 1 tablet by mouth in the morning   furosemide (LASIX) 20 mg tablet   Yes No   Sig: Take 20 mg by mouth in the morning.   levothyroxine 50 mcg tablet   No No   Sig: TAKE 1 TABLET (50 MCG TOTAL) BY MOUTH DAILY   pravastatin (PRAVACHOL) 20 mg tablet   No No   Sig: TAKE 1 TABLET (20 MG TOTAL) BY MOUTH EVERY OTHER DAY      Facility-Administered Medications: None     Current Discharge Medication List        CONTINUE these medications which have NOT CHANGED    Details   acetaminophen (TYLENOL) 650 mg CR tablet Take 650 mg by mouth every 8 (eight) hours as needed for mild pain      albuterol (PROVENTIL HFA,VENTOLIN HFA) 90 mcg/act inhaler Inhale 2 puffs every 6 (six) hours as needed for wheezing  Qty: 8.5 g, Refills: 1    Comments: Substitution to a formulary equivalent within the same pharmaceutical class is authorized.  Associated Diagnoses: Mild intermittent asthma without complication      Cholecalciferol (VITAMIN D) 2000 units CAPS Take 1 tablet by mouth in the morning.      co-enzyme Q-10 30 MG capsule Take 30 mg by mouth in the morning.      cyanocobalamin (VITAMIN B-12) 1000 MCG tablet Take 1 tablet by mouth in the morning       furosemide (LASIX) 20 mg tablet Take 20 mg by mouth in the morning.      levothyroxine 50 mcg tablet TAKE 1 TABLET (50 MCG TOTAL) BY MOUTH DAILY  Qty: 90 tablet, Refills: 3    Associated Diagnoses: Adult hypothyroidism      Lumigan 0.01 % ophthalmic drops INSTILL ONE DROP IN BOTH EYES EVERY NIGHT AT BEDTIME  Qty: 5 mL, Refills: 4    Associated Diagnoses: Primary open angle glaucoma of both eyes, unspecified glaucoma stage      Multiple Vitamins-Minerals (EMERGEN-C IMMUNE PO)       pravastatin (PRAVACHOL) 20 mg tablet TAKE 1 TABLET (20 MG TOTAL) BY MOUTH EVERY OTHER DAY  Qty: 45 tablet, Refills: 2    Associated Diagnoses: Other hyperlipidemia           No discharge procedures on file.  ED SEPSIS DOCUMENTATION   Time reflects when diagnosis was documented in both MDM as applicable and the Disposition within this note       Time User Action Codes Description Comment    6/4/2025  5:35 PM Suman Beltran [W19.XXXA] Fall, initial encounter     6/4/2025  5:35 PM Suman Beltran [S09.90XA] Injury of head, initial encounter     6/4/2025  5:35 PM Suman Beltran [R55] Syncope                    [1]   Past Medical History:  Diagnosis Date    Arthritis     Colon polyps     Disease of thyroid gland     Hyperlipidemia     Migraine     Pure hypercholesterolemia     Thyroid disease    [2]   Past Surgical History:  Procedure Laterality Date    BREAST BIOPSY Right     benign many yrs ago    DILATION AND CURETTAGE, DIAGNOSTIC / THERAPEUTIC      MAMMO STEREOTACTIC BREAST BIOPSY RIGHT (ALL INC) Right     yrs ago-benign    TONSILLECTOMY      TUBAL LIGATION     [3]   Family History  Problem Relation Name Age of Onset    Pancreatic cancer Mother  70        susceptibility     Kidney disease Father      Pancreatic cancer Maternal Aunt          over 50    Pancreatic cancer Paternal Aunt          over 50    No Known Problems Maternal Grandmother      No Known Problems Paternal Grandmother      No Known Problems Maternal Aunt       No Known Problems Maternal Aunt      No Known Problems Maternal Aunt      No Known Problems Paternal Aunt     [4]   Social History  Tobacco Use    Smoking status: Former     Current packs/day: 0.00     Types: Cigarettes     Quit date:      Years since quittin.4    Smokeless tobacco: Never   Vaping Use    Vaping status: Never Used   Substance Use Topics    Alcohol use: Yes     Alcohol/week: 1.0 standard drink of alcohol     Types: 1 Glasses of wine per week     Comment: social     Drug use: No        Suman Beltran DO  [phone]

## 2025-06-05 ENCOUNTER — TELEPHONE (OUTPATIENT)
Age: 85
End: 2025-06-05

## 2025-06-05 ENCOUNTER — TELEMEDICINE (OUTPATIENT)
Dept: FAMILY MEDICINE CLINIC | Facility: CLINIC | Age: 85
End: 2025-06-05
Payer: MEDICARE

## 2025-06-05 DIAGNOSIS — E87.1 HYPONATREMIA: ICD-10-CM

## 2025-06-05 DIAGNOSIS — R00.2 PALPITATIONS: ICD-10-CM

## 2025-06-05 DIAGNOSIS — E04.1 THYROID NODULE: ICD-10-CM

## 2025-06-05 DIAGNOSIS — R07.89 CHEST PRESSURE: ICD-10-CM

## 2025-06-05 DIAGNOSIS — D32.9 MENINGIOMA (HCC): ICD-10-CM

## 2025-06-05 DIAGNOSIS — R55 NEAR SYNCOPE: Primary | ICD-10-CM

## 2025-06-05 LAB
ATRIAL RATE: 75 BPM
P AXIS: 63 DEGREES
PR INTERVAL: 176 MS
QRS AXIS: 38 DEGREES
QRSD INTERVAL: 82 MS
QT INTERVAL: 396 MS
QTC INTERVAL: 442 MS
T WAVE AXIS: 42 DEGREES
VENTRICULAR RATE: 75 BPM

## 2025-06-05 PROCEDURE — 99214 OFFICE O/P EST MOD 30 MIN: CPT | Performed by: NURSE PRACTITIONER

## 2025-06-05 PROCEDURE — 93010 ELECTROCARDIOGRAM REPORT: CPT | Performed by: INTERNAL MEDICINE

## 2025-06-05 PROCEDURE — G2211 COMPLEX E/M VISIT ADD ON: HCPCS | Performed by: NURSE PRACTITIONER

## 2025-06-05 NOTE — TELEPHONE ENCOUNTER
LYLE: I had placed the patient on hold to speak to Krista at [place], but my computer froze and I got disconnected with the patient. Krista stated she will call back the patient to schedule an Emergency Room follow up.

## 2025-06-05 NOTE — PROGRESS NOTES
"Virtual Regular Visit  Name: Yennifer Vanegas      : 1940      MRN: [id number]  Encounter Provider: CATARINA Martin  Encounter Date: 2025   Encounter department: [facility]  :  Assessment & Plan  Thyroid nodule  Non emergent US ordered  Reviewed CT findings with patient   Orders:  •  US thyroid; Future    Palpitations  Ongoing per patient- will refer to cardiology for further eval. If unable to be seen soon could order 48 holter in the interim   Orders:  •  Ambulatory Referral to Cardiology; Future    Chest pressure  Same as above   Orders:  •  Ambulatory Referral to Cardiology; Future    Near syncope  Reviewed differentials with patient-unsure if LOC happened for not. Ct negative for acute findings. Recommend follow up with cardiology to rule out cardiac etiology.   Orders:  •  Ambulatory Referral to Cardiology; Future    Hyponatremia  Repeat labs to check sodium.   Orders:  •  Basic metabolic panel; Future    Meningioma (HCC)  Neuro was monitoring. Diagnosis in . Repeat imaging after that stable.   CT done in ED 2025- noted to be stable           Assessment & Plan        History of Present Illness     History of Present Illness  Here for ED follow up.     Per ED visit yesterday:     \"Patient fell at home after an appointment today. Patient reports feeling light-headed and fell backwards. Patient reports recently having \"balance issues for a while\" and is seeing neuro. Did hit head but denies thinners and asa. Patient reports 2 bumps on head but denies pain at this time. \"    Work up revealed:   Trop negative x 2   CT head a spine done:     Right thyroid nodule measuring up to 3 cm in maximal dimensions, incompletely evaluated on this examination. This can be further evaluated with thyroid ultrasound on a nonemergent basis.     Small extra-axial fat-containing lesion along the right temporal convexity, which likely represents a small lipoma or fatty " meningioma. This appears stable when compared to prior brain MRIs from 2014.     Patient slipped and fell 5/9 as well. Fell on her knees. She needed help up. She's still sore from that fall.     States she doesn't feel her best. She doesn't remember if she had loss of consciousness. She states she has had anxiety attacks since first fall. Is dizzy all the time.   She states ED said it was maybe cardiac related. Had episode of chest pains a few days prior.     Sodium was low.     Has neuropathy/ spinal stenosis follows with [facility].         Review of Systems   Constitutional:  Negative for fever.   Neurological:  Positive for dizziness.       Objective   There were no vitals taken for this visit.    Physical Exam  Constitutional:       General: She is not in acute distress.     Appearance: Normal appearance. She is not ill-appearing, toxic-appearing or diaphoretic.   HENT:      Head: Normocephalic and atraumatic.      Nose: Nose normal.   Pulmonary:      Effort: Pulmonary effort is normal. No respiratory distress.     Skin:     Coloration: Skin is not pale.     Neurological:      General: No focal deficit present.      Mental Status: She is alert and oriented to person, place, and time.     Psychiatric:         Mood and Affect: Mood normal.         Administrative Statements   Encounter provider CATARINA Martin    The Patient is located at Home and in the following state in which I hold an active license [place].    The patient was identified by name and date of birth. Yennifer Vanegas was informed that this is a telemedicine visit and that the visit is being conducted through the Epic Embedded platform. She agrees to proceed..  My office door was closed. No one else was in the room.  She acknowledged consent and understanding of privacy and security of the video platform. The patient has agreed to participate and understands they can discontinue the visit at any time.    I have spent a total time of 25 minutes in  caring for this patient on the day of the visit/encounter including Diagnostic results, Instructions for management, Patient and family education, Documenting in the medical record, Reviewing/placing orders in the medical record (including tests, medications, and/or procedures), Obtaining or reviewing history  , and Communicating with other healthcare professionals , not including the time spent for establishing the audio/video connection.

## 2025-06-05 NOTE — ASSESSMENT & PLAN NOTE
Neuro was monitoring. Diagnosis in 2014. Repeat imaging after that stable.   CT done in ED June 2025- noted to be stable

## 2025-06-05 NOTE — TELEPHONE ENCOUNTER
Patient called today and said she was in the EMERGENCY DEPARTMENT yesterday for a fall and that they told her to follow up with Roxy today. Patient wanted to see if she can do a virtual follow up appointment for today. Please review and advise [phone] thank you.

## 2025-06-06 DIAGNOSIS — E03.9 ADULT HYPOTHYROIDISM: ICD-10-CM

## 2025-06-06 DIAGNOSIS — E78.49 OTHER HYPERLIPIDEMIA: ICD-10-CM

## 2025-06-06 RX ORDER — LEVOTHYROXINE SODIUM 50 UG/1
50 TABLET ORAL DAILY
Qty: 90 TABLET | Refills: 3 | Status: SHIPPED | OUTPATIENT
Start: 2025-06-06

## 2025-06-06 RX ORDER — PRAVASTATIN SODIUM 20 MG
20 TABLET ORAL EVERY OTHER DAY
Qty: 45 TABLET | Refills: 2 | Status: SHIPPED | OUTPATIENT
Start: 2025-06-06

## 2025-06-10 ENCOUNTER — APPOINTMENT (OUTPATIENT)
Dept: LAB | Facility: HOSPITAL | Age: 85
End: 2025-06-10
Payer: MEDICARE

## 2025-06-10 ENCOUNTER — RESULTS FOLLOW-UP (OUTPATIENT)
Dept: FAMILY MEDICINE CLINIC | Facility: CLINIC | Age: 85
End: 2025-06-10

## 2025-06-10 ENCOUNTER — HOSPITAL ENCOUNTER (OUTPATIENT)
Dept: ULTRASOUND IMAGING | Facility: HOSPITAL | Age: 85
Discharge: HOME | End: 2025-06-10
Payer: MEDICARE

## 2025-06-10 DIAGNOSIS — E04.1 THYROID NODULE: Primary | ICD-10-CM

## 2025-06-10 DIAGNOSIS — E04.1 THYROID NODULE: ICD-10-CM

## 2025-06-10 DIAGNOSIS — E87.1 HYPONATREMIA: ICD-10-CM

## 2025-06-10 LAB
ANION GAP SERPL CALCULATED.3IONS-SCNC: 5 MMOL/L (ref 4–13)
BUN SERPL-MCNC: 11 MG/DL (ref 5–25)
CALCIUM SERPL-MCNC: 9.9 MG/DL (ref 8.4–10.2)
CHLORIDE SERPL-SCNC: 101 MMOL/L (ref 96–108)
CO2 SERPL-SCNC: 30 MMOL/L (ref 21–32)
CREAT SERPL-MCNC: 0.74 MG/DL (ref 0.6–1.3)
GFR SERPL CREATININE-BSD FRML MDRD: 74 ML/MIN/1.73SQ M
GLUCOSE P FAST SERPL-MCNC: 96 MG/DL (ref 65–99)
POTASSIUM SERPL-SCNC: 4.5 MMOL/L (ref 3.5–5.3)
SODIUM SERPL-SCNC: 136 MMOL/L (ref 135–147)

## 2025-06-10 PROCEDURE — 36415 COLL VENOUS BLD VENIPUNCTURE: CPT

## 2025-06-10 PROCEDURE — 76536 US EXAM OF HEAD AND NECK: CPT

## 2025-06-10 PROCEDURE — 80048 BASIC METABOLIC PNL TOTAL CA: CPT

## 2025-06-12 ENCOUNTER — TELEPHONE (OUTPATIENT)
Age: 85
End: 2025-06-12

## 2025-06-12 NOTE — TELEPHONE ENCOUNTER
Patient called to make Primary Care Provider aware that she has scheduled her CARDIOLOGY consult.    The first available appointment isn't until 9/17. Patient is concerned because she thought that you would like her to be seen by Cardiology sooner, especially since her recent Emergency Room visit earlier this month.    Please advise and follow up as necessary.

## 2025-06-13 NOTE — TELEPHONE ENCOUNTER
I called and spoke with the patient and she stated they did check all locations and have added her to the cancellation list. She stated she is only comfortable driving due [place] locations. She stated if something sooner becomes available she will take it

## 2025-06-20 NOTE — TELEPHONE ENCOUNTER
Patient calls regarding her message about her US results. I was able to share the results from the provider:  CATARINA Martin to [facility]      6/17/25  4:09 PM  Result Note  3.5 cm right mid gland nodule.   Given characteristics/grading scale given would recommend tissue sample / fine needle aspiration  This would be done through ENT- referral placed     Patient has an appointment with ENT on 6/30/25. Patient will discuss in more detail the tissue sample/biopsy/fine needle aspiration. Patient can be reached at [phone].

## 2025-07-07 ENCOUNTER — TELEPHONE (OUTPATIENT)
Age: 85
End: 2025-07-07

## 2025-07-07 DIAGNOSIS — R30.0 BURNING WITH URINATION: Primary | ICD-10-CM

## 2025-07-07 DIAGNOSIS — R35.0 URINARY FREQUENCY: ICD-10-CM

## 2025-07-07 NOTE — TELEPHONE ENCOUNTER
Patient last office visit: 3/4/2025    *if last visit is over 12 months, patient needs office visit - no labs can be ordered till seen    Patient called with UTI symptoms, current symptoms: frequency, pain with urination  Symptoms onset date: 7/5/2025    Has the patient taken AZO in the last 24 hours:  no  *if yes only a urine culture can be completed     Patient was offered to have a visit with the provider, Urgent Care visit, Nurse visit to collect urine or, orders placed for the lab.   Patient selected: lab     Appointment schedule with Physician:  no  Nurse visit scheduled:  no  Lab orders placed for completion at the lab:  yes       *office collection: POCT urine dip auto non-scope and Urine Culture   *lab orders: Urinalysis with reflex to culture and Urine Culture

## 2025-07-07 NOTE — TELEPHONE ENCOUNTER
Patient complains of of UTI that started over the weekend. Asking if antibiotic can be sent to pharmacy. Please advise, thank you.

## 2025-07-07 NOTE — TELEPHONE ENCOUNTER
I called and spoke with the patient she is having trouble getting around so she would prefer to go the lab closer to her home and complete urine testing and will await results. Orders placed for the patient

## 2025-07-08 NOTE — TELEPHONE ENCOUNTER
Patient called in stating she has been drinking cranberry juice for the past 2 days and thinks that the urinary tract infection has gone away patient states she no longer has symptoms   Please Advise

## 2025-07-16 ENCOUNTER — TELEPHONE (OUTPATIENT)
Dept: RADIOLOGY | Facility: HOSPITAL | Age: 85
End: 2025-07-16

## 2025-07-16 NOTE — NURSING NOTE
Call placed to pt to discuss upcoming appointment at [facility] Radiology Department.  No answer.  Information left on pts voicemail.  Pt is having a Thyroid Biopsy with Molecular Testing completed on 7/18/2025.  Pre procedure instructions given including diet and taking own medications.  Instructed pt that she may eat normally and take medications as usual before the procedure.  Reminded pt of the location, date and time of procedure.  Phone number given for pt to call with any questions.

## 2025-07-18 ENCOUNTER — HOSPITAL ENCOUNTER (OUTPATIENT)
Dept: ULTRASOUND IMAGING | Facility: HOSPITAL | Age: 85
Discharge: HOME | End: 2025-07-18
Attending: OTOLARYNGOLOGY
Payer: MEDICARE

## 2025-07-18 DIAGNOSIS — D44.0 NEOPLASM OF UNCERTAIN BEHAVIOR OF THYROID GLAND: ICD-10-CM

## 2025-07-18 PROCEDURE — 10005 FNA BX W/US GDN 1ST LES: CPT

## 2025-07-18 PROCEDURE — 88173 CYTOPATH EVAL FNA REPORT: CPT | Performed by: STUDENT IN AN ORGANIZED HEALTH CARE EDUCATION/TRAINING PROGRAM

## 2025-07-18 RX ORDER — LIDOCAINE HYDROCHLORIDE 10 MG/ML
5 INJECTION, SOLUTION EPIDURAL; INFILTRATION; INTRACAUDAL; PERINEURAL ONCE
Status: COMPLETED | OUTPATIENT
Start: 2025-07-18 | End: 2025-07-18

## 2025-07-18 RX ADMIN — LIDOCAINE HYDROCHLORIDE 5 ML: 10 INJECTION, SOLUTION EPIDURAL; INFILTRATION; INTRACAUDAL at 09:44

## 2025-07-22 PROCEDURE — 88173 CYTOPATH EVAL FNA REPORT: CPT | Performed by: STUDENT IN AN ORGANIZED HEALTH CARE EDUCATION/TRAINING PROGRAM

## 2025-07-23 ENCOUNTER — OFFICE VISIT (OUTPATIENT)
Dept: CARDIOLOGY CLINIC | Facility: CLINIC | Age: 85
End: 2025-07-23
Payer: MEDICARE

## 2025-07-23 VITALS
SYSTOLIC BLOOD PRESSURE: 141 MMHG | WEIGHT: 162.3 LBS | BODY MASS INDEX: 32.72 KG/M2 | DIASTOLIC BLOOD PRESSURE: 76 MMHG | HEART RATE: 72 BPM | HEIGHT: 59 IN

## 2025-07-23 DIAGNOSIS — R07.89 CHEST PRESSURE: ICD-10-CM

## 2025-07-23 DIAGNOSIS — R55 NEAR SYNCOPE: ICD-10-CM

## 2025-07-23 DIAGNOSIS — R00.2 PALPITATIONS: Primary | ICD-10-CM

## 2025-07-23 PROCEDURE — 99204 OFFICE O/P NEW MOD 45 MIN: CPT | Performed by: INTERNAL MEDICINE

## 2025-07-23 PROCEDURE — 93000 ELECTROCARDIOGRAM COMPLETE: CPT | Performed by: INTERNAL MEDICINE

## 2025-07-23 NOTE — PATIENT INSTRUCTIONS
Assessment & Plan  Palpitations  Chest pressure  Near syncope    Ms. Yennifer FARR Guilherme recent unexplained fall with injury to head.  She does also report intermittent palpitations.  Occasionally she describes some chest pressure feeling.  Description of this symptom is not suggestive of angina.  Description of fall symptoms does not suggest arrhythmogenic near syncope or syncope.  She does have significant peripheral neuropathy severe back issues.  She does also report some tremors sometimes and abnormal gait.  She does recall cousin having Parkinson disease.  She is following with neurologist and has a follow-up appointment scheduled next week.  On physical examination today her blood pressure is slightly on the higher side but within acceptable range.  There is no evidence of pulmonary vascular congestion or significant valvular heart disease.  She does have chronic left greater than right lower extremity edema.  Her ECG is overall normal.  Her blood work is reviewed.  Her thyroid function tested in February was normal.  She does have borderline abnormal lipids.  She had an echocardiogram in 2019 which was overall unremarkable.    Considering all factors show for her presentation and evaluation does not suggest arrhythmia related falls or near syncope however considering her age and comorbidities it would be reasonable to screen for cardiac dysrhythmia.    I am requesting of 14-day extended Holter monitor for further evaluation.  I am advising her to continue normal activities and to keep a diary of symptoms.  She is advised to let us know if she experiences another episode of passing out or near passing out or palpitations.  I am advising her to follow-up with her neurologist as scheduled next month so that  other etiologies such as peripheral neuropathy related near syncope/fall or Parkinson's disease can be considered and ruled out.  She is advised to avoid sudden turning bending down or getting up quickly from  sitting or lying down position.  She is advised to stay well-hydrated and to wear knee-high tight socks when she is going out.  I will schedule her for a follow-up visit in 3 months time.  She is advised to call our office 2 weeks after returning the Zio monitor so that we can discuss the results of the monitoring.  She is asked to continue the prior statin medication.  She is advised to keep an eye on blood pressures and let us know if systolic blood pressure reaches or crosses 150 or diastolic blood pressure reaches start process 85 mmHg.    Orders:    Ambulatory Referral to Cardiology    POCT ECG    Zio Monitor

## 2025-07-23 NOTE — PROGRESS NOTES
Name: Yennifer Vanegas      : 1940      MRN: [id number]  Encounter Provider: Curtis Miles MD  Encounter Date: 2025   Encounter department: [facility] CARDIOLOGY [place]  :  Assessment & Plan  Palpitations  Chest pressure  Near syncope    Ms. Yennifer Vanegas recent unexplained fall with injury to head.  She does also report intermittent palpitations.  Occasionally she describes some chest pressure feeling.  Description of this symptom is not suggestive of angina.  Description of fall symptoms does not suggest arrhythmogenic near syncope or syncope.  She does have significant peripheral neuropathy severe back issues.  She does also report some tremors sometimes and abnormal gait.  She does recall cousin having Parkinson disease.  She is following with neurologist and has a follow-up appointment scheduled next week.  On physical examination today her blood pressure is slightly on the higher side but within acceptable range.  There is no evidence of pulmonary vascular congestion or significant valvular heart disease.  She does have chronic left greater than right lower extremity edema.  Her ECG is overall normal.  Her blood work is reviewed.  Her thyroid function tested in February was normal.  She does have borderline abnormal lipids.  She had an echocardiogram in  which was overall unremarkable.    Considering all factors show for her presentation and evaluation does not suggest arrhythmia related falls or near syncope however considering her age and comorbidities it would be reasonable to screen for cardiac dysrhythmia.    I am requesting of 14-day extended Holter monitor for further evaluation.  I am advising her to continue normal activities and to keep a diary of symptoms.  She is advised to let us know if she experiences another episode of passing out or near passing out or palpitations.  I am advising her to follow-up with her neurologist as scheduled next month so that  other etiologies such  as peripheral neuropathy related near syncope/fall or Parkinson's disease can be considered and ruled out.  She is advised to avoid sudden turning bending down or getting up quickly from sitting or lying down position.  She is advised to stay well-hydrated and to wear knee-high tight socks when she is going out.  I will schedule her for a follow-up visit in 3 months time.  She is advised to call our office 2 weeks after returning the Zio monitor so that we can discuss the results of the monitoring.  She is asked to continue the prior statin medication.  She is advised to keep an eye on blood pressures and let us know if systolic blood pressure reaches or crosses 150 or diastolic blood pressure reaches start process 85 mmHg.    Orders:    Ambulatory Referral to Cardiology    POCT ECG    Zio Monitor        History of Present Illness   HPI  Yennifer Vanegas is a 84 y.o. female who presents for evaluation of symptoms of recurrent near syncope and syncopal events.    History obtained from: patient    Her medical history significant for:  Degenerative joint disease with primary osteoarthritis of both knees, spinal stenosis, spondylolisthesis with chronic lumbosacral radiculitis and radiculopathy  Stage IIIb chronic kidney disease  Hypothyroidism  Asthma  Fatty liver disease  Dyslipidemia  History of unspecified autoimmune disorder.  Glaucoma  Idiopathic peripheral neuropathy    She is here for valuation because she recently had a near syncopal event with fall resulting in head injury.  She has no previous history of hypertension or coronary artery disease or congestive heart failure.  She does have chronic kidney disease and fluid retention and she has been taking furosemide.    She mentions that for the last 2 years or so approximately she has been experiencing bouts of lightheadedness and some balance issues.  She has had loss of balance and falls.  Occasionally independent of the fall she has been experiencing transient  fluttering in the chest.  Recently on  4 June 2025 she had an episode around noon time.  She had a normal beginning of the day and she did some errands outside and came back home.  She saw a message that there was going to be gathering of friends poolside and while she was standing in her bedroom she suddenly had a fall and hit her head.  The fall was backwards.  She does not recall experiencing any prodromal symptoms such as tunneling vision or blackening vision.  She does not recall experiencing any palpitations.  She did not lose consciousness.  She felt some bleeding in her scalp and some lumpy feeling in the head.  She called her friend who took her to urgent care.  From the urgent care she was sent by ambulance to the emergency room at [facility].  She was noted to have markedly elevated blood pressure of 177/88 rest of the vital signs were stable.  She underwent imaging studies including CT scan of head and neck which did not identify any acute abnormality.  Blood work was significant for sodium of 132 mg/dL otherwise evaluation was unremarkable.  She was discharged home with instructions to follow-up with cardiology.  Since that time she has had no recurrence but she does feel unsteady in her gait.    She mentions that over the last 2 years or so on and off she feels lightheaded and has occasional loss of balance.  She also occasionally feels transient fluttering in the chest.  She denies any true syncopal events.  She denies any orthopnea or PND or pedal edema.  She recalls that almost 40 years back when she was taking her birth control pill she was experiencing near syncopal and syncopal events associated with tunneling vision and loss of vision.  At that time she was also diagnosed with ocular migraine and had severe symptoms including headache and transient loss of consciousness.  Symptoms seem to go away after she stopped taking the oral contraceptive pills.    She has had no unusual chest pain  she has has no unusual shortness of breath with normal activity.  She does have unsteady gait and uses a walker for support.  She has chronic lower extremity edema.  She did not any orthopnea or PND.      Functional capacity status: Moderate to good   (Excellent- >10 METs; Good: (7-10 METs); Moderate (4-7 METs); Poor (<= 4 METs)    Any chronic stressors: None   (feeling of poor health, financial problems, and social isolation etc).    Tobacco or alcohol dependence: She used to smoke in the past quit in 1992.  She reports drinking alcohol very very rarely.    Family history of ASCVD: There is no family history of premature CAD or sudden cardiac death.  There is family history of pancreatic cancer and chronic kidney disease.  He does recall an aunt and a cousin with unspecified heart disease.    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  Was born and raised in [place] but spent a lot of time in [place] and [place] before moving to [place] to look for [employer].    Medications   Current cardiac medications: Furosemide 20 mg in the morning + pravastatin 20 mg daily + coenzyme q 10    Current Medications[1]    Last comprehensive blood work available:   Blood work 6/10/2025 sodium 136 potassium 4.5 chloride 101 bicarb 30 BUN 11 creatinine 0.78 GFR 74  Normal LFTs on 6/4/2025  Negative-3 troponins x 2 on 6/4/2024  Lipid profile 2/24/2025  TG 91 HDL 74 calculated   Normal vitamin D level  Normal CBC on 6/4/2025  Hemoglobin A1c 5.7 on 4/17/2025  TSH 2.062 on 2/24/2025    ASCVD risk assessment:     Major cardiac risk factors: -- (Tobacco use, Hypertension, Family history of CHD, Primary severe hypercholesterolemia, DM, multiple major and risk enhancing factors)     Any cardiac clinical risk enhancers from available data: -- (Family history of premature CAD, patient's history of chronic kidney disease, primary hypercholesterolemia, metabolic syndrome, abnormal YOAN, inflammatory  condition such as RA-HIV-psoriasis, RA-HIV-Psoriasis,, pregnancy related complications such as preeclampsia or premature delivery, early menopause, high risk ethnicity such as [place] , social deprivation, physical inactivity, nonalcoholic fatty liver disease psychosocial stress, major psychiatric illness, atrial fibrillation, left ventricular hypertrophy, obstructive sleep apnea, nonalcoholic fatty liver disease).    ECG / Cardiac device data     Results for orders placed or performed in visit on 07/23/25   POCT ECG    Narrative    Sinus rhythm, HR 72 bpm, normal axis, normal intervals, no significant chamber ventricular lodgment, no evidence of prior infarction, no changes of ischemia.       ECG from ER visit on 6/4/2025, normal sinus rhythm with normal axis and intervals and no significant ST or T wave abnormalities.    Echocardiogram 7/19/2019: Normal left atrial size and systolic function, EF 60% grade 1 diastolic dysfunction.  Normal RV size and function.  Normal LA and LV size.  No significant valvular stenosis or regurgitation.  Obvious pulmonary hypertension and no pericardial effusion.    Allergies     Allergies   Allergen Reactions    Molds & Smuts Shortness Of Breath    Tizanidine Diarrhea and Dizziness    Penicillins Hives, Rash and Edema     Category: Allergy;   Other reaction(s): Other (see comments)  Category: Allergy;        *-*-*-*-*-*-*-*-*-*-*-*-*-*-*-*-*-*-*-*-*-*-*-*-*-*-*-*-*-*-*-*-*-*-*-*-*-*-*-*-*-*-*-*-*-*-*-*-*-*-*-*-*-*-   Review of Systems   HENT: Negative.     Respiratory:  Negative for shortness of breath.    Cardiovascular:  Positive for palpitations and leg swelling. Negative for chest pain.   Musculoskeletal:  Positive for arthralgias, back pain, gait problem and joint swelling.   Neurological:  Positive for dizziness and light-headedness. Negative for syncope and headaches.   Hematological: Negative.    Psychiatric/Behavioral: Negative.     All other systems reviewed and are  "negative.         Objective   /76   Pulse 72   Ht 4' 11\" (1.499 m)   Wt 73.6 kg (162 lb 4.8 oz)   BMI 32.78 kg/m²      Physical Exam  Vitals reviewed.   HENT:      Nose: Nose normal.      Mouth/Throat:      Mouth: Mucous membranes are dry.     Cardiovascular:      Rate and Rhythm: Normal rate and regular rhythm.   Pulmonary:      Effort: Pulmonary effort is normal.      Breath sounds: Normal breath sounds.   Abdominal:      Palpations: Abdomen is soft.     Skin:     General: Skin is warm and dry.     Neurological:      Mental Status: She is alert and oriented to person, place, and time. Mental status is at baseline.     Psychiatric:         Mood and Affect: Mood normal.         Behavior: Behavior normal.                  [1]   Current Outpatient Medications:     acetaminophen (TYLENOL) 650 mg CR tablet, Take 650 mg by mouth every 8 (eight) hours as needed for mild pain, Disp: , Rfl:     albuterol (PROVENTIL HFA,VENTOLIN HFA) 90 mcg/act inhaler, Inhale 2 puffs every 6 (six) hours as needed for wheezing, Disp: 8.5 g, Rfl: 1    Cholecalciferol (VITAMIN D) 2000 units CAPS, Take 1 tablet by mouth in the morning., Disp: , Rfl:     co-enzyme Q-10 30 MG capsule, Take 30 mg by mouth in the morning., Disp: , Rfl:     cyanocobalamin (VITAMIN B-12) 1000 MCG tablet, Take 1 tablet by mouth in the morning, Disp: , Rfl:     furosemide (LASIX) 20 mg tablet, Take 20 mg by mouth in the morning., Disp: , Rfl:     levothyroxine 50 mcg tablet, TAKE 1 TABLET (50 MCG TOTAL) BY MOUTH DAILY, Disp: 90 tablet, Rfl: 3    Lumigan 0.01 % ophthalmic drops, INSTILL ONE DROP IN BOTH EYES EVERY NIGHT AT BEDTIME, Disp: 5 mL, Rfl: 4    Multiple Vitamins-Minerals (EMERGEN-C IMMUNE PO), , Disp: , Rfl:     pravastatin (PRAVACHOL) 20 mg tablet, TAKE 1 TABLET (20 MG TOTAL) BY MOUTH EVERY OTHER DAY, Disp: 45 tablet, Rfl: 2    "

## 2025-08-12 ENCOUNTER — VBI (OUTPATIENT)
Dept: ADMINISTRATIVE | Facility: OTHER | Age: 85
End: 2025-08-12